# Patient Record
Sex: FEMALE | Race: WHITE | Employment: OTHER | URBAN - METROPOLITAN AREA
[De-identification: names, ages, dates, MRNs, and addresses within clinical notes are randomized per-mention and may not be internally consistent; named-entity substitution may affect disease eponyms.]

---

## 2018-03-06 ENCOUNTER — OFFICE VISIT (OUTPATIENT)
Dept: ORTHOPEDIC SURGERY | Age: 63
End: 2018-03-06

## 2018-03-06 VITALS — BODY MASS INDEX: 24.55 KG/M2 | HEIGHT: 61 IN | WEIGHT: 130 LBS

## 2018-03-06 DIAGNOSIS — G56.01 CARPAL TUNNEL SYNDROME OF RIGHT WRIST: ICD-10-CM

## 2018-03-06 PROCEDURE — 1036F TOBACCO NON-USER: CPT | Performed by: ORTHOPAEDIC SURGERY

## 2018-03-06 PROCEDURE — 3017F COLORECTAL CA SCREEN DOC REV: CPT | Performed by: ORTHOPAEDIC SURGERY

## 2018-03-06 PROCEDURE — G8420 CALC BMI NORM PARAMETERS: HCPCS | Performed by: ORTHOPAEDIC SURGERY

## 2018-03-06 PROCEDURE — G8484 FLU IMMUNIZE NO ADMIN: HCPCS | Performed by: ORTHOPAEDIC SURGERY

## 2018-03-06 PROCEDURE — 99203 OFFICE O/P NEW LOW 30 MIN: CPT | Performed by: ORTHOPAEDIC SURGERY

## 2018-03-06 PROCEDURE — G8427 DOCREV CUR MEDS BY ELIG CLIN: HCPCS | Performed by: ORTHOPAEDIC SURGERY

## 2018-03-06 PROCEDURE — 3014F SCREEN MAMMO DOC REV: CPT | Performed by: ORTHOPAEDIC SURGERY

## 2018-03-06 RX ORDER — ASPIRIN 81 MG/1
81 TABLET ORAL DAILY
COMMUNITY

## 2018-03-06 RX ORDER — INSULIN GLARGINE 100 [IU]/ML
40 INJECTION, SOLUTION SUBCUTANEOUS NIGHTLY
COMMUNITY
End: 2021-11-01

## 2018-03-06 RX ORDER — OXYCODONE HYDROCHLORIDE 5 MG/1
5 CAPSULE ORAL
COMMUNITY
End: 2018-03-27

## 2018-03-06 RX ORDER — AMLODIPINE BESYLATE 5 MG/1
10 TABLET ORAL DAILY
COMMUNITY
Start: 2016-05-11

## 2018-03-06 RX ORDER — LEVOTHYROXINE SODIUM 0.07 MG/1
100 TABLET ORAL DAILY
COMMUNITY
End: 2019-02-05 | Stop reason: DRUGHIGH

## 2018-03-06 RX ORDER — ATORVASTATIN CALCIUM 10 MG/1
40 TABLET, FILM COATED ORAL DAILY
Status: ON HOLD | COMMUNITY
End: 2019-03-02 | Stop reason: CLARIF

## 2018-03-06 RX ORDER — MONTELUKAST SODIUM 10 MG/1
10 TABLET ORAL NIGHTLY
COMMUNITY

## 2018-03-06 RX ORDER — TRAZODONE HYDROCHLORIDE 50 MG/1
25 TABLET ORAL NIGHTLY
COMMUNITY
End: 2021-11-01

## 2018-03-06 RX ORDER — CLOPIDOGREL BISULFATE 75 MG/1
75 TABLET ORAL
COMMUNITY

## 2018-03-06 RX ORDER — ROPINIROLE 4 MG/1
TABLET, FILM COATED ORAL
COMMUNITY
Start: 2017-05-31

## 2018-03-19 ENCOUNTER — TELEPHONE (OUTPATIENT)
Dept: ORTHOPEDIC SURGERY | Age: 63
End: 2018-03-19

## 2018-04-03 ENCOUNTER — TELEPHONE (OUTPATIENT)
Dept: ORTHOPEDIC SURGERY | Age: 63
End: 2018-04-03

## 2018-04-04 ENCOUNTER — HOSPITAL ENCOUNTER (OUTPATIENT)
Dept: SURGERY | Age: 63
Discharge: HOME | End: 2018-04-04
Attending: ORTHOPAEDIC SURGERY | Admitting: ORTHOPAEDIC SURGERY

## 2018-04-04 VITALS
HEIGHT: 61 IN | DIASTOLIC BLOOD PRESSURE: 61 MMHG | TEMPERATURE: 97.4 F | HEART RATE: 69 BPM | OXYGEN SATURATION: 98 % | RESPIRATION RATE: 18 BRPM | SYSTOLIC BLOOD PRESSURE: 141 MMHG | WEIGHT: 130 LBS | BODY MASS INDEX: 24.55 KG/M2

## 2018-04-04 DIAGNOSIS — G56.01 CARPAL TUNNEL SYNDROME OF RIGHT WRIST: Primary | ICD-10-CM

## 2018-04-04 LAB
GLUCOSE BLD-MCNC: 114 MG/DL (ref 70–99)
GLUCOSE BLD-MCNC: 93 MG/DL (ref 70–99)
PERFORMED ON: ABNORMAL
PERFORMED ON: NORMAL

## 2018-04-04 RX ORDER — LABETALOL HYDROCHLORIDE 5 MG/ML
5 INJECTION, SOLUTION INTRAVENOUS EVERY 10 MIN PRN
Status: DISCONTINUED | OUTPATIENT
Start: 2018-04-04 | End: 2018-04-05 | Stop reason: HOSPADM

## 2018-04-04 RX ORDER — HYDRALAZINE HYDROCHLORIDE 20 MG/ML
5 INJECTION INTRAMUSCULAR; INTRAVENOUS
Status: DISCONTINUED | OUTPATIENT
Start: 2018-04-04 | End: 2018-04-05 | Stop reason: HOSPADM

## 2018-04-04 RX ORDER — OXYCODONE HYDROCHLORIDE AND ACETAMINOPHEN 5; 325 MG/1; MG/1
2 TABLET ORAL PRN
Status: ACTIVE | OUTPATIENT
Start: 2018-04-04 | End: 2018-04-04

## 2018-04-04 RX ORDER — OXYCODONE HYDROCHLORIDE AND ACETAMINOPHEN 5; 325 MG/1; MG/1
1 TABLET ORAL PRN
Status: ACTIVE | OUTPATIENT
Start: 2018-04-04 | End: 2018-04-04

## 2018-04-04 RX ORDER — DIPHENHYDRAMINE HYDROCHLORIDE 50 MG/ML
12.5 INJECTION INTRAMUSCULAR; INTRAVENOUS
Status: ACTIVE | OUTPATIENT
Start: 2018-04-04 | End: 2018-04-04

## 2018-04-04 RX ORDER — SODIUM CHLORIDE 0.9 % (FLUSH) 0.9 %
10 SYRINGE (ML) INJECTION PRN
Status: DISCONTINUED | OUTPATIENT
Start: 2018-04-04 | End: 2018-04-05 | Stop reason: HOSPADM

## 2018-04-04 RX ORDER — HYDROCODONE BITARTRATE AND ACETAMINOPHEN 5; 325 MG/1; MG/1
1 TABLET ORAL EVERY 6 HOURS PRN
Qty: 10 TABLET | Refills: 0 | Status: SHIPPED | OUTPATIENT
Start: 2018-04-04 | End: 2018-04-11

## 2018-04-04 RX ORDER — MEPERIDINE HYDROCHLORIDE 50 MG/ML
12.5 INJECTION INTRAMUSCULAR; INTRAVENOUS; SUBCUTANEOUS EVERY 5 MIN PRN
Status: DISCONTINUED | OUTPATIENT
Start: 2018-04-04 | End: 2018-04-05 | Stop reason: HOSPADM

## 2018-04-04 RX ORDER — MORPHINE SULFATE 2 MG/ML
2 INJECTION, SOLUTION INTRAMUSCULAR; INTRAVENOUS EVERY 5 MIN PRN
Status: DISCONTINUED | OUTPATIENT
Start: 2018-04-04 | End: 2018-04-05 | Stop reason: HOSPADM

## 2018-04-04 RX ORDER — LIDOCAINE HYDROCHLORIDE 10 MG/ML
1 INJECTION, SOLUTION EPIDURAL; INFILTRATION; INTRACAUDAL; PERINEURAL
Status: ACTIVE | OUTPATIENT
Start: 2018-04-04 | End: 2018-04-04

## 2018-04-04 RX ORDER — ONDANSETRON 2 MG/ML
4 INJECTION INTRAMUSCULAR; INTRAVENOUS
Status: ACTIVE | OUTPATIENT
Start: 2018-04-04 | End: 2018-04-04

## 2018-04-04 RX ORDER — SODIUM CHLORIDE, SODIUM LACTATE, POTASSIUM CHLORIDE, CALCIUM CHLORIDE 600; 310; 30; 20 MG/100ML; MG/100ML; MG/100ML; MG/100ML
INJECTION, SOLUTION INTRAVENOUS CONTINUOUS
Status: DISCONTINUED | OUTPATIENT
Start: 2018-04-04 | End: 2018-04-05 | Stop reason: HOSPADM

## 2018-04-04 RX ORDER — MORPHINE SULFATE 2 MG/ML
1 INJECTION, SOLUTION INTRAMUSCULAR; INTRAVENOUS EVERY 5 MIN PRN
Status: DISCONTINUED | OUTPATIENT
Start: 2018-04-04 | End: 2018-04-05 | Stop reason: HOSPADM

## 2018-04-04 RX ORDER — SODIUM CHLORIDE 0.9 % (FLUSH) 0.9 %
10 SYRINGE (ML) INJECTION EVERY 12 HOURS SCHEDULED
Status: DISCONTINUED | OUTPATIENT
Start: 2018-04-04 | End: 2018-04-05 | Stop reason: HOSPADM

## 2018-04-04 RX ADMIN — SODIUM CHLORIDE, SODIUM LACTATE, POTASSIUM CHLORIDE, CALCIUM CHLORIDE: 600; 310; 30; 20 INJECTION, SOLUTION INTRAVENOUS at 09:11

## 2018-04-04 ASSESSMENT — PAIN SCALES - GENERAL
PAINLEVEL_OUTOF10: 0

## 2018-04-04 ASSESSMENT — PAIN - FUNCTIONAL ASSESSMENT: PAIN_FUNCTIONAL_ASSESSMENT: 0-10

## 2018-04-17 ENCOUNTER — OFFICE VISIT (OUTPATIENT)
Dept: ORTHOPEDIC SURGERY | Age: 63
End: 2018-04-17

## 2018-04-17 VITALS — WEIGHT: 130.07 LBS | BODY MASS INDEX: 24.56 KG/M2 | HEIGHT: 61 IN

## 2018-04-17 DIAGNOSIS — G56.01 CARPAL TUNNEL SYNDROME OF RIGHT WRIST: Primary | ICD-10-CM

## 2018-04-17 PROCEDURE — 99024 POSTOP FOLLOW-UP VISIT: CPT | Performed by: ORTHOPAEDIC SURGERY

## 2018-04-17 PROCEDURE — L3908 WHO COCK-UP NONMOLDE PRE OTS: HCPCS | Performed by: ORTHOPAEDIC SURGERY

## 2018-05-08 ENCOUNTER — OFFICE VISIT (OUTPATIENT)
Dept: ORTHOPEDIC SURGERY | Age: 63
End: 2018-05-08

## 2018-05-08 VITALS
SYSTOLIC BLOOD PRESSURE: 129 MMHG | BODY MASS INDEX: 24.56 KG/M2 | DIASTOLIC BLOOD PRESSURE: 79 MMHG | HEIGHT: 61 IN | WEIGHT: 130.07 LBS

## 2018-05-08 DIAGNOSIS — M65.352 TRIGGER FINGER, LEFT LITTLE FINGER: ICD-10-CM

## 2018-05-08 PROCEDURE — 99024 POSTOP FOLLOW-UP VISIT: CPT | Performed by: ORTHOPAEDIC SURGERY

## 2018-05-08 PROCEDURE — 20550 NJX 1 TENDON SHEATH/LIGAMENT: CPT | Performed by: ORTHOPAEDIC SURGERY

## 2018-08-17 ENCOUNTER — HOSPITAL ENCOUNTER (OUTPATIENT)
Dept: SURGERY | Age: 63
Discharge: HOME | End: 2018-08-17
Attending: ANESTHESIOLOGY | Admitting: ANESTHESIOLOGY

## 2018-08-17 VITALS
HEIGHT: 61 IN | OXYGEN SATURATION: 96 % | TEMPERATURE: 97.4 F | WEIGHT: 138.56 LBS | SYSTOLIC BLOOD PRESSURE: 140 MMHG | DIASTOLIC BLOOD PRESSURE: 63 MMHG | RESPIRATION RATE: 16 BRPM | HEART RATE: 70 BPM | BODY MASS INDEX: 26.16 KG/M2

## 2018-08-17 DIAGNOSIS — R52 PAIN: ICD-10-CM

## 2018-08-17 LAB
ANION GAP SERPL CALCULATED.3IONS-SCNC: 10 MMOL/L (ref 3–16)
BUN BLDV-MCNC: 28 MG/DL (ref 7–20)
CALCIUM SERPL-MCNC: 8.9 MG/DL (ref 8.3–10.6)
CHLORIDE BLD-SCNC: 98 MMOL/L (ref 99–110)
CO2: 28 MMOL/L (ref 21–32)
CREAT SERPL-MCNC: 1 MG/DL (ref 0.6–1.2)
GFR AFRICAN AMERICAN: >60
GFR NON-AFRICAN AMERICAN: 56
GLUCOSE BLD-MCNC: 112 MG/DL (ref 70–99)
GLUCOSE BLD-MCNC: 88 MG/DL (ref 70–99)
GLUCOSE BLD-MCNC: 98 MG/DL (ref 70–99)
PERFORMED ON: NORMAL
PERFORMED ON: NORMAL
POTASSIUM SERPL-SCNC: 4.4 MMOL/L (ref 3.5–5.1)
SODIUM BLD-SCNC: 136 MMOL/L (ref 136–145)

## 2018-08-17 RX ORDER — SODIUM CHLORIDE 0.9 % (FLUSH) 0.9 %
10 SYRINGE (ML) INJECTION EVERY 12 HOURS SCHEDULED
Status: DISCONTINUED | OUTPATIENT
Start: 2018-08-17 | End: 2018-08-18 | Stop reason: HOSPADM

## 2018-08-17 RX ORDER — SODIUM CHLORIDE 0.9 % (FLUSH) 0.9 %
10 SYRINGE (ML) INJECTION PRN
Status: DISCONTINUED | OUTPATIENT
Start: 2018-08-17 | End: 2018-08-18 | Stop reason: HOSPADM

## 2018-08-17 RX ORDER — LABETALOL HYDROCHLORIDE 5 MG/ML
5 INJECTION, SOLUTION INTRAVENOUS EVERY 10 MIN PRN
Status: DISCONTINUED | OUTPATIENT
Start: 2018-08-17 | End: 2018-08-18 | Stop reason: HOSPADM

## 2018-08-17 RX ORDER — FENTANYL CITRATE 50 UG/ML
25 INJECTION, SOLUTION INTRAMUSCULAR; INTRAVENOUS EVERY 5 MIN PRN
Status: DISCONTINUED | OUTPATIENT
Start: 2018-08-17 | End: 2018-08-18 | Stop reason: HOSPADM

## 2018-08-17 RX ORDER — SODIUM CHLORIDE 9 MG/ML
INJECTION, SOLUTION INTRAVENOUS CONTINUOUS
Status: DISCONTINUED | OUTPATIENT
Start: 2018-08-17 | End: 2018-08-18 | Stop reason: HOSPADM

## 2018-08-17 RX ORDER — ONDANSETRON 2 MG/ML
4 INJECTION INTRAMUSCULAR; INTRAVENOUS
Status: ACTIVE | OUTPATIENT
Start: 2018-08-17 | End: 2018-08-17

## 2018-08-17 RX ORDER — HYDROMORPHONE HCL 110MG/55ML
0.5 PATIENT CONTROLLED ANALGESIA SYRINGE INTRAVENOUS EVERY 5 MIN PRN
Status: DISCONTINUED | OUTPATIENT
Start: 2018-08-17 | End: 2018-08-18 | Stop reason: HOSPADM

## 2018-08-17 RX ORDER — CEFAZOLIN SODIUM 2 G/100ML
2 INJECTION, SOLUTION INTRAVENOUS ONCE
Status: COMPLETED | OUTPATIENT
Start: 2018-08-17 | End: 2018-08-17

## 2018-08-17 RX ORDER — HYDROMORPHONE HCL 110MG/55ML
0.25 PATIENT CONTROLLED ANALGESIA SYRINGE INTRAVENOUS EVERY 5 MIN PRN
Status: DISCONTINUED | OUTPATIENT
Start: 2018-08-17 | End: 2018-08-18 | Stop reason: HOSPADM

## 2018-08-17 RX ORDER — SODIUM CHLORIDE, SODIUM LACTATE, POTASSIUM CHLORIDE, CALCIUM CHLORIDE 600; 310; 30; 20 MG/100ML; MG/100ML; MG/100ML; MG/100ML
INJECTION, SOLUTION INTRAVENOUS CONTINUOUS
Status: DISCONTINUED | OUTPATIENT
Start: 2018-08-17 | End: 2018-08-18 | Stop reason: HOSPADM

## 2018-08-17 RX ORDER — FENTANYL CITRATE 50 UG/ML
50 INJECTION, SOLUTION INTRAMUSCULAR; INTRAVENOUS EVERY 5 MIN PRN
Status: DISCONTINUED | OUTPATIENT
Start: 2018-08-17 | End: 2018-08-18 | Stop reason: HOSPADM

## 2018-08-17 RX ADMIN — SODIUM CHLORIDE: 9 INJECTION, SOLUTION INTRAVENOUS at 08:28

## 2018-08-17 RX ADMIN — CEFAZOLIN SODIUM 2 G: 2 INJECTION, SOLUTION INTRAVENOUS at 08:41

## 2018-08-17 ASSESSMENT — LIFESTYLE VARIABLES: SMOKING_STATUS: 1

## 2018-08-17 ASSESSMENT — PAIN - FUNCTIONAL ASSESSMENT: PAIN_FUNCTIONAL_ASSESSMENT: 0-10

## 2018-08-17 ASSESSMENT — PAIN SCALES - GENERAL
PAINLEVEL_OUTOF10: 0

## 2018-08-17 NOTE — PROGRESS NOTES
Pt d/c'd to waiting room with daughter. Awaiting [facility]. [facility] stated they will not call on arrival and will leave if pt not waiting/ready. Pt stable.  Vss.

## 2018-08-17 NOTE — ANESTHESIA PRE-OP
Yes Historical Provider, MD   rOPINIRole (REQUIP) 4 MG tablet TAKE 1 TABLET EVERY DAY 5/31/17  Yes Historical Provider, MD   VORTIoxetine (TRINTELLIX) 10 MG TABS tablet Take 10 mg by mouth daily    Yes Historical Provider, MD   linagliptin (TRADJENTA) 5 MG tablet Take 5 mg by mouth daily    Yes Historical Provider, MD   traZODone (DESYREL) 50 MG tablet Take 50 mg by mouth nightly   Yes Historical Provider, MD   tiotropium (SPIRIVA) 18 MCG inhalation capsule Inhale 18 mcg into the lungs daily    Historical Provider, MD   aspirin (ASPIR-LOW) 81 MG EC tablet Take 81 mg by mouth daily     Historical Provider, MD   clopidogrel (PLAVIX) 75 MG tablet Take 75 mg by mouth    Historical Provider, MD       Current medications:    Current Outpatient Prescriptions   Medication Sig Dispense Refill    gabapentin (NEURONTIN) 300 MG capsule Take 300 mg by mouth 3 times daily. Hershal Call pantoprazole (PROTONIX) 40 MG tablet Take 40 mg by mouth daily      cyclobenzaprine (FLEXERIL) 10 MG tablet Take 10 mg by mouth 3 times daily as needed      tapentadol (NUCYNTA) 75 MG TABS Take 75 mg by mouth every 8 hours. Hershal Call benztropine (COGENTIN) 1 MG tablet Take 1 mg by mouth daily      Roflumilast (DALIRESP) 250 MCG TABS Take 1 tablet by mouth daily      insulin glargine (LANTUS) 100 UNIT/ML injection vial Inject 30 Units into the skin daily AM      insulin aspart (NOVOLOG) 100 UNIT/ML injection vial Inject 8 Units into the skin 3 times daily (before meals) PLUS SLIDING SCALE      amLODIPine (NORVASC) 5 MG tablet Take 10 mg by mouth daily       atorvastatin (LIPITOR) 10 MG tablet Take 40 mg by mouth daily       carbidopa-levodopa (SINEMET)  MG per tablet Take 3 tablets by mouth 3 times daily       levothyroxine (SYNTHROID) 75 MCG tablet Take 100 mcg by mouth Daily       lurasidone (LATUDA) 60 MG TABS tablet Take 60 mg by mouth nightly       metoprolol tartrate (LOPRESSOR) 25 MG tablet Take 25 mg by mouth 2 times daily       montelukast (SINGULAIR) 10 MG tablet Take 10 mg by mouth nightly       rOPINIRole (REQUIP) 4 MG tablet TAKE 1 TABLET EVERY DAY      VORTIoxetine (TRINTELLIX) 10 MG TABS tablet Take 10 mg by mouth daily       linagliptin (TRADJENTA) 5 MG tablet Take 5 mg by mouth daily       traZODone (DESYREL) 50 MG tablet Take 50 mg by mouth nightly      tiotropium (SPIRIVA) 18 MCG inhalation capsule Inhale 18 mcg into the lungs daily      aspirin (ASPIR-LOW) 81 MG EC tablet Take 81 mg by mouth daily       clopidogrel (PLAVIX) 75 MG tablet Take 75 mg by mouth       Current Facility-Administered Medications   Medication Dose Route Frequency Provider Last Rate Last Dose    ceFAZolin (ANCEF) 2 g in dextrose 4 % 100 mL IVPB (premix)  2 g Intravenous Once Marija Saravia MD           Allergies: Allergies   Allergen Reactions    Ipratropium-Albuterol Other (See Comments)     Nose bleed    Ipratropium-Albuterol      Nose bleeds    Metformin Other (See Comments)     Effects kidneys    Primidone Other (See Comments)     Other reaction(s):  Other (See Comments)  Loss of muscle control  COULDN'T USE HER MUSCLES    Quinidine Hives    Sulfa Antibiotics Rash       Problem List:    Patient Active Problem List   Diagnosis Code    Carpal tunnel syndrome of right wrist G56.01    Trigger finger, left little finger M65.352       Past Medical History:        Diagnosis Date    Anxiety     Arthritis     CAD (coronary artery disease)     [facility] obstructive [facility]) [facility] [address]     Depression     Diabetes mellitus ([facility] [address]     GERD (gastroesophageal reflux disease)     Hyperlipidemia     Hypertension     Kidney failure     r/t sepsis    PACO (obstructive sleep apnea)     Parkinson disease (HCC)     Restless leg syndrome     Thyroid disease        Past Surgical History:        Procedure Laterality Date    BRONCHOSCOPY  06/2017    [facility]    CARPAL TUNNEL RELEASE Left     CARPAL TUNNEL RELEASE Right 04/04/2018 abnormalities, . Abdominal:           Vascular:                                      Anesthesia Plan      TIVA     ASA 3       Induction: intravenous. Anesthetic plan and risks discussed with patient. Plan discussed with CRNA. MEDICATIONS:  Current Outpatient Prescriptions   Medication Sig Dispense Refill    gabapentin (NEURONTIN) 300 MG capsule Take 300 mg by mouth 3 times daily. Elsa Goes pantoprazole (PROTONIX) 40 MG tablet Take 40 mg by mouth daily      cyclobenzaprine (FLEXERIL) 10 MG tablet Take 10 mg by mouth 3 times daily as needed      tapentadol (NUCYNTA) 75 MG TABS Take 75 mg by mouth every 8 hours. Elsa Goes benztropine (COGENTIN) 1 MG tablet Take 1 mg by mouth daily      Roflumilast (DALIRESP) 250 MCG TABS Take 1 tablet by mouth daily      insulin glargine (LANTUS) 100 UNIT/ML injection vial Inject 30 Units into the skin daily AM      insulin aspart (NOVOLOG) 100 UNIT/ML injection vial Inject 8 Units into the skin 3 times daily (before meals) PLUS SLIDING SCALE      amLODIPine (NORVASC) 5 MG tablet Take 10 mg by mouth daily       atorvastatin (LIPITOR) 10 MG tablet Take 40 mg by mouth daily       carbidopa-levodopa (SINEMET)  MG per tablet Take 3 tablets by mouth 3 times daily       levothyroxine (SYNTHROID) 75 MCG tablet Take 100 mcg by mouth Daily       lurasidone (LATUDA) 60 MG TABS tablet Take 60 mg by mouth nightly       metoprolol tartrate (LOPRESSOR) 25 MG tablet Take 25 mg by mouth 2 times daily       montelukast (SINGULAIR) 10 MG tablet Take 10 mg by mouth nightly       rOPINIRole (REQUIP) 4 MG tablet TAKE 1 TABLET EVERY DAY      VORTIoxetine (TRINTELLIX) 10 MG TABS tablet Take 10 mg by mouth daily       linagliptin (TRADJENTA) 5 MG tablet Take 5 mg by mouth daily       traZODone (DESYREL) 50 MG tablet Take 50 mg by mouth nightly      tiotropium (SPIRIVA) 18 MCG inhalation capsule Inhale 18 mcg into the lungs daily      aspirin (ASPIR-LOW) 81 MG EC tablet Take 81 mg by mouth daily       clopidogrel (PLAVIX) 75 MG tablet Take 75 mg by mouth       Current Facility-Administered Medications   Medication Dose Route Frequency Provider Last Rate Last Dose    ceFAZolin (ANCEF) 2 g in dextrose 4 % 100 mL IVPB (premix)  2 g Intravenous Once Wolfgangam Lauren Boyd MD            LABS:  Lab Results   Component Value Date    WBC 9.4 01/13/2010    HGB 14.2 01/13/2010    HCT 42.4 01/13/2010    MCV 88.4 01/13/2010     01/13/2010    GLUCOSE 76 01/13/2010       Lab Results   Component Value Date     01/13/2010    K 4.7 01/13/2010     01/13/2010    CO2 33 (H) 01/13/2010    BUN 21 (H) 01/13/2010    CREATININE 1.0 01/13/2010       Problem List:  Patient Active Problem List   Diagnosis    Carpal tunnel syndrome of right wrist    Trigger finger, left little finger           Sai Diaz MD   8/17/2018

## 2018-08-17 NOTE — PROGRESS NOTES
PT received into bay 6 from OR. Pt with eyes closed, responds to verbal stimuli. O2 at 4L/NC. Resp easy, unlabored. Pt appears comfortable. Report obtained. Mid lower back dressing clean/dry/intact. Will monitor.

## 2018-08-21 NOTE — ANESTHESIA POST-OP
Anesthesia Post-op Note    Patient: Berna Rodriguez  MRN: [id number]  YOB: 1955  Date of evaluation: 8/21/2018  Time:  6:15 PM     Procedure(s) Performed:     Last Vitals: BP (!) 140/63   Pulse 70   Temp 97.4 °F (36.3 °C) (Temporal)   Resp 16   Ht 5' 1\" (1.549 m)   Wt 138 lb 9 oz (62.9 kg)   SpO2 96%   BMI 26.18 kg/m²     Chary Phase I: Chary Score: 10    Chary Phase II: Chary Score: 10    Anesthesia Post Evaluation    Final anesthesia type: TIVA and MAC  Patient location during evaluation: PACU  Airway patency: patent  Complications: no  Cardiovascular status: hemodynamically stable  Respiratory status: acceptable        Sai Diaz MD  6:15 PM

## 2018-08-21 NOTE — OP NOTE
confirmation of vertebral count through thoracolumbar films, the C-arm was used to help define the angle and access point for the spine. Prior to needle placement, and after location of the access point, under fluoroscopic guidance L3-L4 area was identified, 10 mL of 1% lidocaine mixed with 0.25% Marcaine was used to anesthetize the skin. A scalpel was used to make a stab incision down to the supraspinous ligament. A Vertiflex dilator was placed into the skin and advanced using intermittent fluoroscopic guidance in an alternating AP and lateral images down to the lamina between L3 and L4. A series of dilators were used to place the working cannula in proper position, dorsal to the lamina. A measuring gauge was introduced to the proper depth and the space was measured. The space measured and found to be 08mm. A 10mm Superion device was introduced and deployed at the correct depth and then advanced down to the lamina. AP and lateral images were taken to confirm proper placement of the device. Same steps were repeated at the L4-L5 level, the space measured and found to be 10mm    Then, started to close the wound and started closing with Vicryle 3/0 in 2 layers and the skin with Monocryle 4/0 sutures. The incision then covered with 4 x 4 gauze and tegaderm. The patient was then taken from the procedure room via stretcher, and placed in a recovery room. Once in the recovery room, the patient felt instant relief in her leg pain.           The patient was discharged home in good condition after being given discharge instructions

## 2018-10-17 ENCOUNTER — OFFICE VISIT (OUTPATIENT)
Dept: ORTHOPEDIC SURGERY | Age: 63
End: 2018-10-17
Payer: COMMERCIAL

## 2018-10-17 VITALS
WEIGHT: 138 LBS | BODY MASS INDEX: 26.06 KG/M2 | DIASTOLIC BLOOD PRESSURE: 80 MMHG | HEART RATE: 101 BPM | SYSTOLIC BLOOD PRESSURE: 159 MMHG | HEIGHT: 61 IN

## 2018-10-17 DIAGNOSIS — M25.511 RIGHT SHOULDER PAIN, UNSPECIFIED CHRONICITY: ICD-10-CM

## 2018-10-17 DIAGNOSIS — S42.211A CLOSED DISPLACED FRACTURE OF SURGICAL NECK OF RIGHT HUMERUS, UNSPECIFIED FRACTURE MORPHOLOGY, INITIAL ENCOUNTER: Primary | ICD-10-CM

## 2018-10-17 PROCEDURE — G8419 CALC BMI OUT NRM PARAM NOF/U: HCPCS | Performed by: ORTHOPAEDIC SURGERY

## 2018-10-17 PROCEDURE — G8484 FLU IMMUNIZE NO ADMIN: HCPCS | Performed by: ORTHOPAEDIC SURGERY

## 2018-10-17 PROCEDURE — 1036F TOBACCO NON-USER: CPT | Performed by: ORTHOPAEDIC SURGERY

## 2018-10-17 PROCEDURE — G8427 DOCREV CUR MEDS BY ELIG CLIN: HCPCS | Performed by: ORTHOPAEDIC SURGERY

## 2018-10-17 PROCEDURE — 99214 OFFICE O/P EST MOD 30 MIN: CPT | Performed by: ORTHOPAEDIC SURGERY

## 2018-10-17 PROCEDURE — L3670 SO ACRO/CLAV CAN WEB PRE OTS: HCPCS | Performed by: ORTHOPAEDIC SURGERY

## 2018-10-17 PROCEDURE — 3017F COLORECTAL CA SCREEN DOC REV: CPT | Performed by: ORTHOPAEDIC SURGERY

## 2018-10-17 NOTE — PROGRESS NOTES
disease       Social History     Social History    Marital status: Legally      Spouse name: N/A    Number of children: N/A    Years of education: N/A     Occupational History    Not on file. Social History Main Topics    Smoking status: Former Smoker     Years: 40.00     Quit date: 3/6/2014    Smokeless tobacco: Never Used    Alcohol use Yes      Comment: rarely    Drug use: No    Sexual activity: Not on file     Other Topics Concern    Not on file     Social History Narrative    No narrative on file       Allergies   Allergen Reactions    Ipratropium-Albuterol Other (See Comments)     Nose bleed    Ipratropium-Albuterol      Nose bleeds    Metformin Other (See Comments)     Effects kidneys    Primidone Other (See Comments)     Other reaction(s): Other (See Comments)  Loss of muscle control  COULDN'T USE HER MUSCLES    Quinidine Hives    Sulfa Antibiotics Rash     Current Outpatient Prescriptions on File Prior to Visit   Medication Sig Dispense Refill    gabapentin (NEURONTIN) 300 MG capsule Take 300 mg by mouth 3 times daily. Eric Sever pantoprazole (PROTONIX) 40 MG tablet Take 40 mg by mouth daily      cyclobenzaprine (FLEXERIL) 10 MG tablet Take 10 mg by mouth 3 times daily as needed      tiotropium (SPIRIVA) 18 MCG inhalation capsule Inhale 18 mcg into the lungs daily      tapentadol (NUCYNTA) 75 MG TABS Take 75 mg by mouth every 8 hours. Eric Sever benztropine (COGENTIN) 1 MG tablet Take 1 mg by mouth daily      Roflumilast (DALIRESP) 250 MCG TABS Take 1 tablet by mouth daily      insulin glargine (LANTUS) 100 UNIT/ML injection vial Inject 30 Units into the skin daily AM      insulin aspart (NOVOLOG) 100 UNIT/ML injection vial Inject 8 Units into the skin 3 times daily (before meals) PLUS SLIDING SCALE      amLODIPine (NORVASC) 5 MG tablet Take 10 mg by mouth daily       atorvastatin (LIPITOR) 10 MG tablet Take 40 mg by mouth daily       aspirin (ASPIR-LOW) 81 MG EC tablet Take with a verbal recognition program (DRAGON) and it was checked for errors. It is possible that there are still dictated errors within this office note. If so, please bring any errors to my attention for an addendum. All efforts were made to ensure that this office note is accurate.    ________________________  I, Dr. Trini Jang, personally performed the services described in this documentation as described by Brian Kim PA-C in my presence, and it is both accurate and complete. Shiv Lay MD, PhD  10/17/2018

## 2018-10-23 RX ORDER — SODIUM CHLORIDE, SODIUM LACTATE, POTASSIUM CHLORIDE, CALCIUM CHLORIDE 600; 310; 30; 20 MG/100ML; MG/100ML; MG/100ML; MG/100ML
INJECTION, SOLUTION INTRAVENOUS CONTINUOUS
Status: CANCELLED | OUTPATIENT
Start: 2018-10-26

## 2018-10-24 ENCOUNTER — TELEPHONE (OUTPATIENT)
Dept: ORTHOPEDIC SURGERY | Age: 63
End: 2018-10-24

## 2018-10-24 ENCOUNTER — HOSPITAL ENCOUNTER (OUTPATIENT)
Dept: PREADMISSION TESTING | Age: 63
Discharge: HOME | DRG: 492 | End: 2018-10-28
Payer: COMMERCIAL

## 2018-10-24 VITALS
HEART RATE: 104 BPM | BODY MASS INDEX: 27.38 KG/M2 | OXYGEN SATURATION: 93 % | RESPIRATION RATE: 16 BRPM | TEMPERATURE: 96.8 F | SYSTOLIC BLOOD PRESSURE: 150 MMHG | DIASTOLIC BLOOD PRESSURE: 72 MMHG | WEIGHT: 145 LBS | HEIGHT: 61 IN

## 2018-10-24 LAB
ABO/RH: NORMAL
AMORPHOUS: ABNORMAL /HPF
ANION GAP SERPL CALCULATED.3IONS-SCNC: 12 MMOL/L (ref 3–16)
ANTIBODY SCREEN: NORMAL
APTT: 37.6 SEC (ref 26–36)
BACTERIA: ABNORMAL /HPF
BASOPHILS ABSOLUTE: 0 K/UL (ref 0–0.2)
BASOPHILS RELATIVE PERCENT: 0.4 %
BILIRUBIN URINE: NEGATIVE
BLOOD, URINE: NEGATIVE
BUN BLDV-MCNC: 22 MG/DL (ref 7–20)
CALCIUM SERPL-MCNC: 9.2 MG/DL (ref 8.3–10.6)
CHLORIDE BLD-SCNC: 100 MMOL/L (ref 99–110)
CLARITY: CLEAR
CO2: 27 MMOL/L (ref 21–32)
COLOR: YELLOW
CREAT SERPL-MCNC: 1 MG/DL (ref 0.6–1.2)
EKG ATRIAL RATE: 101 BPM
EKG DIAGNOSIS: NORMAL
EKG P AXIS: 79 DEGREES
EKG P-R INTERVAL: 156 MS
EKG Q-T INTERVAL: 374 MS
EKG QRS DURATION: 130 MS
EKG QTC CALCULATION (BAZETT): 484 MS
EKG R AXIS: 140 DEGREES
EKG T AXIS: 64 DEGREES
EKG VENTRICULAR RATE: 101 BPM
EOSINOPHILS ABSOLUTE: 0.4 K/UL (ref 0–0.6)
EOSINOPHILS RELATIVE PERCENT: 5 %
EPITHELIAL CELLS, UA: ABNORMAL /HPF
GFR AFRICAN AMERICAN: >60
GFR NON-AFRICAN AMERICAN: 56
GLUCOSE BLD-MCNC: 54 MG/DL (ref 70–99)
GLUCOSE URINE: NEGATIVE MG/DL
HCT VFR BLD CALC: 39.6 % (ref 36–48)
HEMOGLOBIN: 12.9 G/DL (ref 12–16)
INR BLD: 0.95 (ref 0.86–1.14)
KETONES, URINE: NEGATIVE MG/DL
LEUKOCYTE ESTERASE, URINE: NEGATIVE
LYMPHOCYTES ABSOLUTE: 1.4 K/UL (ref 1–5.1)
LYMPHOCYTES RELATIVE PERCENT: 17.5 %
MCH RBC QN AUTO: 29.9 PG (ref 26–34)
MCHC RBC AUTO-ENTMCNC: 32.5 G/DL (ref 31–36)
MCV RBC AUTO: 92 FL (ref 80–100)
MICROSCOPIC EXAMINATION: YES
MONOCYTES ABSOLUTE: 0.9 K/UL (ref 0–1.3)
MONOCYTES RELATIVE PERCENT: 10.9 %
NEUTROPHILS ABSOLUTE: 5.5 K/UL (ref 1.7–7.7)
NEUTROPHILS RELATIVE PERCENT: 66.2 %
NITRITE, URINE: NEGATIVE
PDW BLD-RTO: 15.4 % (ref 12.4–15.4)
PH UA: 6
PLATELET # BLD: 280 K/UL (ref 135–450)
PMV BLD AUTO: 7.5 FL (ref 5–10.5)
POTASSIUM SERPL-SCNC: 4.1 MMOL/L (ref 3.5–5.1)
PROTEIN UA: 30 MG/DL
PROTHROMBIN TIME: 10.8 SEC (ref 9.8–13)
RBC # BLD: 4.3 M/UL (ref 4–5.2)
RBC UA: ABNORMAL /HPF (ref 0–2)
SODIUM BLD-SCNC: 139 MMOL/L (ref 136–145)
SPECIFIC GRAVITY UA: 1.01
URINE TYPE: ABNORMAL
UROBILINOGEN, URINE: 0.2 E.U./DL
WBC # BLD: 8.3 K/UL (ref 4–11)
WBC UA: ABNORMAL /HPF (ref 0–5)

## 2018-10-24 PROCEDURE — 93010 ELECTROCARDIOGRAM REPORT: CPT | Performed by: INTERNAL MEDICINE

## 2018-10-24 PROCEDURE — 87086 URINE CULTURE/COLONY COUNT: CPT

## 2018-10-24 PROCEDURE — 86900 BLOOD TYPING SEROLOGIC ABO: CPT

## 2018-10-24 PROCEDURE — 86850 RBC ANTIBODY SCREEN: CPT

## 2018-10-24 PROCEDURE — 85025 COMPLETE CBC W/AUTO DIFF WBC: CPT

## 2018-10-24 PROCEDURE — 81001 URINALYSIS AUTO W/SCOPE: CPT

## 2018-10-24 PROCEDURE — 86901 BLOOD TYPING SEROLOGIC RH(D): CPT

## 2018-10-24 PROCEDURE — 83036 HEMOGLOBIN GLYCOSYLATED A1C: CPT

## 2018-10-24 PROCEDURE — 80048 BASIC METABOLIC PNL TOTAL CA: CPT

## 2018-10-24 PROCEDURE — 93005 ELECTROCARDIOGRAM TRACING: CPT | Performed by: ORTHOPAEDIC SURGERY

## 2018-10-24 PROCEDURE — 85730 THROMBOPLASTIN TIME PARTIAL: CPT

## 2018-10-24 PROCEDURE — 85610 PROTHROMBIN TIME: CPT

## 2018-10-24 PROCEDURE — 87641 MR-STAPH DNA AMP PROBE: CPT

## 2018-10-24 RX ORDER — QUETIAPINE FUMARATE 25 MG/1
25 TABLET, FILM COATED ORAL NIGHTLY
COMMUNITY
End: 2022-01-26

## 2018-10-24 NOTE — PROGRESS NOTES
If you have a Living Will or Durable Power of , please bring a copy on the day of your procedure. 15.  With your permission, one family member may accompany you while you are being prepared for surgery. Once you are ready, additional family members may join you. HOW WE KEEP YOU SAFE and WORK TO PREVENT SURGICAL SITE INFECTIONS:  1. Health care workers should always check your ID bracelet to verify your name and birth date. You will be asked many times to state your name, date of birth, and allergies. 2. Health care workers should always clean their hands with soap or alcohol gel before providing care to you. It is okay to ask anyone if they cleaned their hands before they touch you. 3. You will be actively involved in verifying the type of procedure you are having and ensuring the correct surgical site. This will be confirmed multiple times prior to your procedure. Do NOT monique your surgery site UNLESS instructed to by your surgeon. 4. Do not shave or wax for 72 hours prior to procedure near your operative site. Shaving with a razor can irritate your skin and make it easier to develop an infection. On the day of your procedure, any hair that needs to be removed near the surgical site will be clipped by a healthcare worker using a special clippers designed to avoid skin irritation. 5. When you are in the operating room, your surgical site will be cleansed with a special soap, and in most cases, you will be given an antibiotic before the surgery begins. What to expect AFTER YOUR PROCEDURE:  1. Immediately following your procedure, your will be taken to the PACU for the first phase of your recovery. Your nurse will help you recover from any potential side effects of anesthesia, such as extreme drowsiness, changes in your vital signs or breathing patterns. Nausea, headache, muscle aches, or sore throat may also occur after anesthesia.   Your nurse will help you manage these potential side

## 2018-10-25 ENCOUNTER — ANESTHESIA EVENT (OUTPATIENT)
Dept: OPERATING ROOM | Age: 63
DRG: 492 | End: 2018-10-25
Payer: COMMERCIAL

## 2018-10-25 LAB
ESTIMATED AVERAGE GLUCOSE: 159.9 MG/DL
HBA1C MFR BLD: 7.2 %
MRSA SCREEN RT-PCR: NORMAL
URINE CULTURE, ROUTINE: NORMAL

## 2018-10-25 NOTE — PROGRESS NOTES
EKG was faxed to Dr. Ashly Cabrera both on 10-24 and 10-25, requesting review and clearance for surgery on 10-26. [facility] for his MA to please call us back with an answer or fax ASAP. Also spoke with Maddi Greco at Dr. Opal De Luna office, requested to please render clearance as this is what pt saw him for today. She will ask him and fax clearance ASAP. Pulmonary assessment and recommendation faxed to Dr. Epi Donohue, scanned, and placed on the front to chart to show anesthesia    Spoke with Dr. Ashly Cabrera, he stated pt brought the EKG done at [facility] with her and did review it. Read interp of EKG from 10-24 over the phone to Dr. Ashly Cabrera and he   OK it for surgery.

## 2018-10-26 ENCOUNTER — APPOINTMENT (OUTPATIENT)
Dept: GENERAL RADIOLOGY | Age: 63
DRG: 492 | End: 2018-10-26
Attending: ORTHOPAEDIC SURGERY
Payer: COMMERCIAL

## 2018-10-26 ENCOUNTER — HOSPITAL ENCOUNTER (INPATIENT)
Age: 63
LOS: 4 days | Discharge: NURSING, REHAB OR LONG-TERM CARE | DRG: 492 | End: 2018-10-31
Attending: ORTHOPAEDIC SURGERY | Admitting: ORTHOPAEDIC SURGERY
Payer: COMMERCIAL

## 2018-10-26 ENCOUNTER — ANESTHESIA (OUTPATIENT)
Dept: OPERATING ROOM | Age: 63
DRG: 492 | End: 2018-10-26
Payer: COMMERCIAL

## 2018-10-26 VITALS — OXYGEN SATURATION: 97 % | TEMPERATURE: 96.3 F | SYSTOLIC BLOOD PRESSURE: 114 MMHG | DIASTOLIC BLOOD PRESSURE: 62 MMHG

## 2018-10-26 DIAGNOSIS — S42.291A CLOSED 3-PART FRACTURE OF PROXIMAL HUMERUS, RIGHT, INITIAL ENCOUNTER: Primary | ICD-10-CM

## 2018-10-26 LAB
ABO/RH: NORMAL
ANTIBODY SCREEN: NORMAL
GLUCOSE BLD-MCNC: 140 MG/DL (ref 70–99)
GLUCOSE BLD-MCNC: 168 MG/DL (ref 70–99)
PERFORMED ON: ABNORMAL
PERFORMED ON: ABNORMAL

## 2018-10-26 PROCEDURE — 7100000001 HC PACU RECOVERY - ADDTL 15 MIN: Performed by: ORTHOPAEDIC SURGERY

## 2018-10-26 PROCEDURE — 6360000002 HC RX W HCPCS: Performed by: ORTHOPAEDIC SURGERY

## 2018-10-26 PROCEDURE — 2580000003 HC RX 258: Performed by: ORTHOPAEDIC SURGERY

## 2018-10-26 PROCEDURE — 3600000004 HC SURGERY LEVEL 4 BASE: Performed by: ORTHOPAEDIC SURGERY

## 2018-10-26 PROCEDURE — 2709999900 HC NON-CHARGEABLE SUPPLY: Performed by: ORTHOPAEDIC SURGERY

## 2018-10-26 PROCEDURE — 2580000003 HC RX 258: Performed by: ANESTHESIOLOGY

## 2018-10-26 PROCEDURE — 2500000003 HC RX 250 WO HCPCS: Performed by: NURSE ANESTHETIST, CERTIFIED REGISTERED

## 2018-10-26 PROCEDURE — L3650 SO 8 ABD RESTRAINT PRE OTS: HCPCS | Performed by: ORTHOPAEDIC SURGERY

## 2018-10-26 PROCEDURE — 2700000000 HC OXYGEN THERAPY PER DAY

## 2018-10-26 PROCEDURE — 2500000003 HC RX 250 WO HCPCS: Performed by: ANESTHESIOLOGY

## 2018-10-26 PROCEDURE — C1713 ANCHOR/SCREW BN/BN,TIS/BN: HCPCS | Performed by: ORTHOPAEDIC SURGERY

## 2018-10-26 PROCEDURE — 3600000014 HC SURGERY LEVEL 4 ADDTL 15MIN: Performed by: ORTHOPAEDIC SURGERY

## 2018-10-26 PROCEDURE — 94640 AIRWAY INHALATION TREATMENT: CPT

## 2018-10-26 PROCEDURE — 86850 RBC ANTIBODY SCREEN: CPT

## 2018-10-26 PROCEDURE — S0028 INJECTION, FAMOTIDINE, 20 MG: HCPCS | Performed by: NURSE ANESTHETIST, CERTIFIED REGISTERED

## 2018-10-26 PROCEDURE — 6360000002 HC RX W HCPCS: Performed by: ANESTHESIOLOGY

## 2018-10-26 PROCEDURE — 6360000002 HC RX W HCPCS: Performed by: NURSE ANESTHETIST, CERTIFIED REGISTERED

## 2018-10-26 PROCEDURE — 3700000001 HC ADD 15 MINUTES (ANESTHESIA): Performed by: ORTHOPAEDIC SURGERY

## 2018-10-26 PROCEDURE — 6370000000 HC RX 637 (ALT 250 FOR IP): Performed by: ANESTHESIOLOGY

## 2018-10-26 PROCEDURE — 94664 DEMO&/EVAL PT USE INHALER: CPT

## 2018-10-26 PROCEDURE — 86900 BLOOD TYPING SEROLOGIC ABO: CPT

## 2018-10-26 PROCEDURE — 0PSC04Z REPOSITION RIGHT HUMERAL HEAD WITH INTERNAL FIXATION DEVICE, OPEN APPROACH: ICD-10-PCS | Performed by: ORTHOPAEDIC SURGERY

## 2018-10-26 PROCEDURE — 86901 BLOOD TYPING SEROLOGIC RH(D): CPT

## 2018-10-26 PROCEDURE — 73060 X-RAY EXAM OF HUMERUS: CPT

## 2018-10-26 PROCEDURE — C9290 INJ, BUPIVACAINE LIPOSOME: HCPCS | Performed by: ANESTHESIOLOGY

## 2018-10-26 PROCEDURE — 3700000000 HC ANESTHESIA ATTENDED CARE: Performed by: ORTHOPAEDIC SURGERY

## 2018-10-26 PROCEDURE — 94760 N-INVAS EAR/PLS OXIMETRY 1: CPT

## 2018-10-26 PROCEDURE — 94761 N-INVAS EAR/PLS OXIMETRY MLT: CPT

## 2018-10-26 PROCEDURE — 0RNJ0ZZ RELEASE RIGHT SHOULDER JOINT, OPEN APPROACH: ICD-10-PCS | Performed by: ORTHOPAEDIC SURGERY

## 2018-10-26 PROCEDURE — 0LS30ZZ REPOSITION RIGHT UPPER ARM TENDON, OPEN APPROACH: ICD-10-PCS | Performed by: ORTHOPAEDIC SURGERY

## 2018-10-26 PROCEDURE — 3209999900 FLUORO FOR SURGICAL PROCEDURES

## 2018-10-26 PROCEDURE — 7100000000 HC PACU RECOVERY - FIRST 15 MIN: Performed by: ORTHOPAEDIC SURGERY

## 2018-10-26 PROCEDURE — 6370000000 HC RX 637 (ALT 250 FOR IP): Performed by: ORTHOPAEDIC SURGERY

## 2018-10-26 PROCEDURE — 64415 NJX AA&/STRD BRCH PLXS IMG: CPT | Performed by: ANESTHESIOLOGY

## 2018-10-26 DEVICE — IMPLANTABLE DEVICE: Type: IMPLANTABLE DEVICE | Site: SHOULDER | Status: FUNCTIONAL

## 2018-10-26 DEVICE — DBX MIX, 10CC
Type: IMPLANTABLE DEVICE | Site: SHOULDER | Status: FUNCTIONAL
Brand: DBX®

## 2018-10-26 DEVICE — PLATE BNE L90MM STD 3 H PROX HUM S STL LOK COMPR FOR 3.5MM: Type: IMPLANTABLE DEVICE | Site: SHOULDER | Status: FUNCTIONAL

## 2018-10-26 DEVICE — SCREW BNE L36MM DIA3.5MM CORT S STL ST LOK FULL THRD: Type: IMPLANTABLE DEVICE | Site: SHOULDER | Status: FUNCTIONAL

## 2018-10-26 DEVICE — SCREW BNE L26MM DIA3.5MM CORT S STL ST LOK FULL THRD: Type: IMPLANTABLE DEVICE | Site: SHOULDER | Status: FUNCTIONAL

## 2018-10-26 DEVICE — K WIRE FIX L150MM DIA1.6MM S STL THRD TRCR PNT: Type: IMPLANTABLE DEVICE | Site: SHOULDER | Status: FUNCTIONAL

## 2018-10-26 DEVICE — SCREW BNE L40MM DIA3.5MM CORT S STL ST LOK FULL THRD: Type: IMPLANTABLE DEVICE | Site: SHOULDER | Status: FUNCTIONAL

## 2018-10-26 DEVICE — SCREW BNE L28MM DIA3.5MM CORT S STL ST NONCANNULATED LOK: Type: IMPLANTABLE DEVICE | Site: SHOULDER | Status: FUNCTIONAL

## 2018-10-26 DEVICE — SCREW BNE L28MM DIA3.5MM CORT S STL ST LOK FULL THRD: Type: IMPLANTABLE DEVICE | Site: SHOULDER | Status: FUNCTIONAL

## 2018-10-26 DEVICE — SCREW BNE L45MM DIA3.5MM CORT S STL ST LOK FULL THRD: Type: IMPLANTABLE DEVICE | Site: SHOULDER | Status: FUNCTIONAL

## 2018-10-26 RX ORDER — BUPIVACAINE HYDROCHLORIDE 5 MG/ML
INJECTION, SOLUTION EPIDURAL; INTRACAUDAL
Status: COMPLETED
Start: 2018-10-26 | End: 2018-10-26

## 2018-10-26 RX ORDER — BUPIVACAINE HYDROCHLORIDE 5 MG/ML
INJECTION, SOLUTION EPIDURAL; INTRACAUDAL PRN
Status: DISCONTINUED | OUTPATIENT
Start: 2018-10-26 | End: 2018-10-26 | Stop reason: SDUPTHER

## 2018-10-26 RX ORDER — PROPOFOL 10 MG/ML
INJECTION, EMULSION INTRAVENOUS PRN
Status: DISCONTINUED | OUTPATIENT
Start: 2018-10-26 | End: 2018-10-26 | Stop reason: SDUPTHER

## 2018-10-26 RX ORDER — DIPHENHYDRAMINE HYDROCHLORIDE 50 MG/ML
12.5 INJECTION INTRAMUSCULAR; INTRAVENOUS
Status: DISCONTINUED | OUTPATIENT
Start: 2018-10-26 | End: 2018-10-26 | Stop reason: HOSPADM

## 2018-10-26 RX ORDER — LABETALOL HYDROCHLORIDE 5 MG/ML
5 INJECTION, SOLUTION INTRAVENOUS EVERY 10 MIN PRN
Status: DISCONTINUED | OUTPATIENT
Start: 2018-10-26 | End: 2018-10-26 | Stop reason: HOSPADM

## 2018-10-26 RX ORDER — MAGNESIUM HYDROXIDE 1200 MG/15ML
LIQUID ORAL CONTINUOUS PRN
Status: COMPLETED | OUTPATIENT
Start: 2018-10-26 | End: 2018-10-26

## 2018-10-26 RX ORDER — FENTANYL CITRATE 50 UG/ML
INJECTION, SOLUTION INTRAMUSCULAR; INTRAVENOUS PRN
Status: DISCONTINUED | OUTPATIENT
Start: 2018-10-26 | End: 2018-10-26 | Stop reason: SDUPTHER

## 2018-10-26 RX ORDER — SODIUM CHLORIDE 0.9 % (FLUSH) 0.9 %
10 SYRINGE (ML) INJECTION EVERY 12 HOURS SCHEDULED
Status: DISCONTINUED | OUTPATIENT
Start: 2018-10-26 | End: 2018-10-31 | Stop reason: HOSPADM

## 2018-10-26 RX ORDER — ACETAMINOPHEN 325 MG/1
650 TABLET ORAL EVERY 4 HOURS PRN
Status: DISCONTINUED | OUTPATIENT
Start: 2018-10-26 | End: 2018-10-31 | Stop reason: HOSPADM

## 2018-10-26 RX ORDER — PROMETHAZINE HYDROCHLORIDE 25 MG/ML
6.25 INJECTION, SOLUTION INTRAMUSCULAR; INTRAVENOUS
Status: DISCONTINUED | OUTPATIENT
Start: 2018-10-26 | End: 2018-10-26 | Stop reason: HOSPADM

## 2018-10-26 RX ORDER — MIDAZOLAM HYDROCHLORIDE 1 MG/ML
INJECTION INTRAMUSCULAR; INTRAVENOUS PRN
Status: DISCONTINUED | OUTPATIENT
Start: 2018-10-26 | End: 2018-10-26 | Stop reason: SDUPTHER

## 2018-10-26 RX ORDER — SODIUM CHLORIDE, SODIUM LACTATE, POTASSIUM CHLORIDE, CALCIUM CHLORIDE 600; 310; 30; 20 MG/100ML; MG/100ML; MG/100ML; MG/100ML
INJECTION, SOLUTION INTRAVENOUS CONTINUOUS
Status: DISCONTINUED | OUTPATIENT
Start: 2018-10-26 | End: 2018-10-26

## 2018-10-26 RX ORDER — ONDANSETRON 2 MG/ML
4 INJECTION INTRAMUSCULAR; INTRAVENOUS EVERY 6 HOURS PRN
Status: DISCONTINUED | OUTPATIENT
Start: 2018-10-26 | End: 2018-10-31 | Stop reason: HOSPADM

## 2018-10-26 RX ORDER — MEPERIDINE HYDROCHLORIDE 25 MG/ML
12.5 INJECTION INTRAMUSCULAR; INTRAVENOUS; SUBCUTANEOUS EVERY 5 MIN PRN
Status: DISCONTINUED | OUTPATIENT
Start: 2018-10-26 | End: 2018-10-26 | Stop reason: HOSPADM

## 2018-10-26 RX ORDER — SODIUM CHLORIDE 0.9 % (FLUSH) 0.9 %
10 SYRINGE (ML) INJECTION PRN
Status: DISCONTINUED | OUTPATIENT
Start: 2018-10-26 | End: 2018-10-31 | Stop reason: HOSPADM

## 2018-10-26 RX ORDER — HYDRALAZINE HYDROCHLORIDE 20 MG/ML
5 INJECTION INTRAMUSCULAR; INTRAVENOUS EVERY 10 MIN PRN
Status: DISCONTINUED | OUTPATIENT
Start: 2018-10-26 | End: 2018-10-26 | Stop reason: HOSPADM

## 2018-10-26 RX ORDER — DOCUSATE SODIUM 100 MG/1
100 CAPSULE, LIQUID FILLED ORAL 2 TIMES DAILY
Status: DISCONTINUED | OUTPATIENT
Start: 2018-10-26 | End: 2018-10-31 | Stop reason: HOSPADM

## 2018-10-26 RX ORDER — OXYCODONE HYDROCHLORIDE 5 MG/1
10 TABLET ORAL EVERY 4 HOURS PRN
Status: DISCONTINUED | OUTPATIENT
Start: 2018-10-26 | End: 2018-10-31 | Stop reason: HOSPADM

## 2018-10-26 RX ORDER — OXYCODONE HYDROCHLORIDE 5 MG/1
5 TABLET ORAL PRN
Status: DISCONTINUED | OUTPATIENT
Start: 2018-10-26 | End: 2018-10-26 | Stop reason: HOSPADM

## 2018-10-26 RX ORDER — MORPHINE SULFATE 4 MG/ML
1 INJECTION, SOLUTION INTRAMUSCULAR; INTRAVENOUS EVERY 5 MIN PRN
Status: DISCONTINUED | OUTPATIENT
Start: 2018-10-26 | End: 2018-10-26 | Stop reason: HOSPADM

## 2018-10-26 RX ORDER — FENTANYL CITRATE 50 UG/ML
INJECTION, SOLUTION INTRAMUSCULAR; INTRAVENOUS
Status: COMPLETED
Start: 2018-10-26 | End: 2018-10-26

## 2018-10-26 RX ORDER — ALBUTEROL SULFATE 2.5 MG/3ML
2.5 SOLUTION RESPIRATORY (INHALATION) ONCE
Status: COMPLETED | OUTPATIENT
Start: 2018-10-26 | End: 2018-10-26

## 2018-10-26 RX ORDER — ONDANSETRON 2 MG/ML
INJECTION INTRAMUSCULAR; INTRAVENOUS PRN
Status: DISCONTINUED | OUTPATIENT
Start: 2018-10-26 | End: 2018-10-26 | Stop reason: SDUPTHER

## 2018-10-26 RX ORDER — MONTELUKAST SODIUM 10 MG/1
10 TABLET ORAL NIGHTLY
Status: DISCONTINUED | OUTPATIENT
Start: 2018-10-26 | End: 2018-10-31 | Stop reason: HOSPADM

## 2018-10-26 RX ORDER — TRAZODONE HYDROCHLORIDE 50 MG/1
50 TABLET ORAL NIGHTLY
Status: DISCONTINUED | OUTPATIENT
Start: 2018-10-26 | End: 2018-10-31 | Stop reason: HOSPADM

## 2018-10-26 RX ORDER — LEVOTHYROXINE SODIUM 0.1 MG/1
100 TABLET ORAL DAILY
Status: DISCONTINUED | OUTPATIENT
Start: 2018-10-27 | End: 2018-10-31 | Stop reason: HOSPADM

## 2018-10-26 RX ORDER — MIDAZOLAM HYDROCHLORIDE 1 MG/ML
INJECTION INTRAMUSCULAR; INTRAVENOUS
Status: COMPLETED
Start: 2018-10-26 | End: 2018-10-26

## 2018-10-26 RX ORDER — ATORVASTATIN CALCIUM 20 MG/1
40 TABLET, FILM COATED ORAL NIGHTLY
Status: DISCONTINUED | OUTPATIENT
Start: 2018-10-26 | End: 2018-10-31 | Stop reason: HOSPADM

## 2018-10-26 RX ORDER — AMLODIPINE BESYLATE 10 MG/1
10 TABLET ORAL DAILY
Status: DISCONTINUED | OUTPATIENT
Start: 2018-10-27 | End: 2018-10-31 | Stop reason: HOSPADM

## 2018-10-26 RX ORDER — SODIUM CHLORIDE 9 MG/ML
INJECTION, SOLUTION INTRAVENOUS CONTINUOUS
Status: DISCONTINUED | OUTPATIENT
Start: 2018-10-26 | End: 2018-10-31 | Stop reason: HOSPADM

## 2018-10-26 RX ORDER — ASPIRIN 81 MG/1
81 TABLET ORAL DAILY
Status: DISCONTINUED | OUTPATIENT
Start: 2018-10-27 | End: 2018-10-31 | Stop reason: HOSPADM

## 2018-10-26 RX ORDER — METOCLOPRAMIDE HYDROCHLORIDE 5 MG/ML
10 INJECTION INTRAMUSCULAR; INTRAVENOUS
Status: DISCONTINUED | OUTPATIENT
Start: 2018-10-26 | End: 2018-10-26 | Stop reason: HOSPADM

## 2018-10-26 RX ORDER — BENZTROPINE MESYLATE 1 MG/1
0.5 TABLET ORAL NIGHTLY
Status: DISCONTINUED | OUTPATIENT
Start: 2018-10-26 | End: 2018-10-31 | Stop reason: HOSPADM

## 2018-10-26 RX ORDER — ROPINIROLE 2 MG/1
4 TABLET, FILM COATED ORAL DAILY
Status: DISCONTINUED | OUTPATIENT
Start: 2018-10-27 | End: 2018-10-31 | Stop reason: HOSPADM

## 2018-10-26 RX ORDER — OXYCODONE HYDROCHLORIDE 5 MG/1
5 TABLET ORAL EVERY 4 HOURS PRN
Status: DISCONTINUED | OUTPATIENT
Start: 2018-10-26 | End: 2018-10-31 | Stop reason: HOSPADM

## 2018-10-26 RX ORDER — DEXAMETHASONE SODIUM PHOSPHATE 4 MG/ML
INJECTION, SOLUTION INTRA-ARTICULAR; INTRALESIONAL; INTRAMUSCULAR; INTRAVENOUS; SOFT TISSUE PRN
Status: DISCONTINUED | OUTPATIENT
Start: 2018-10-26 | End: 2018-10-26 | Stop reason: SDUPTHER

## 2018-10-26 RX ORDER — QUETIAPINE FUMARATE 25 MG/1
25 TABLET, FILM COATED ORAL NIGHTLY
Status: DISCONTINUED | OUTPATIENT
Start: 2018-10-26 | End: 2018-10-31 | Stop reason: HOSPADM

## 2018-10-26 RX ORDER — MORPHINE SULFATE 2 MG/ML
4 INJECTION, SOLUTION INTRAMUSCULAR; INTRAVENOUS
Status: DISPENSED | OUTPATIENT
Start: 2018-10-26 | End: 2018-10-28

## 2018-10-26 RX ORDER — MORPHINE SULFATE 2 MG/ML
2 INJECTION, SOLUTION INTRAMUSCULAR; INTRAVENOUS
Status: DISPENSED | OUTPATIENT
Start: 2018-10-26 | End: 2018-10-28

## 2018-10-26 RX ORDER — OXYCODONE HYDROCHLORIDE 5 MG/1
10 TABLET ORAL PRN
Status: DISCONTINUED | OUTPATIENT
Start: 2018-10-26 | End: 2018-10-26 | Stop reason: HOSPADM

## 2018-10-26 RX ORDER — ROCURONIUM BROMIDE 10 MG/ML
INJECTION, SOLUTION INTRAVENOUS PRN
Status: DISCONTINUED | OUTPATIENT
Start: 2018-10-26 | End: 2018-10-26 | Stop reason: SDUPTHER

## 2018-10-26 RX ORDER — GABAPENTIN 300 MG/1
300 CAPSULE ORAL 3 TIMES DAILY
Status: DISCONTINUED | OUTPATIENT
Start: 2018-10-26 | End: 2018-10-31 | Stop reason: HOSPADM

## 2018-10-26 RX ORDER — CLOPIDOGREL BISULFATE 75 MG/1
75 TABLET ORAL DAILY
Status: DISCONTINUED | OUTPATIENT
Start: 2018-10-27 | End: 2018-10-31 | Stop reason: HOSPADM

## 2018-10-26 RX ADMIN — SODIUM CHLORIDE, SODIUM LACTATE, POTASSIUM CHLORIDE, AND CALCIUM CHLORIDE: 600; 310; 30; 20 INJECTION, SOLUTION INTRAVENOUS at 17:55

## 2018-10-26 RX ADMIN — ALBUTEROL SULFATE 2.5 MG: 2.5 SOLUTION RESPIRATORY (INHALATION) at 14:20

## 2018-10-26 RX ADMIN — GABAPENTIN 300 MG: 300 CAPSULE ORAL at 21:30

## 2018-10-26 RX ADMIN — TRAZODONE HYDROCHLORIDE 50 MG: 50 TABLET ORAL at 21:31

## 2018-10-26 RX ADMIN — BUPIVACAINE 10 ML: 13.3 INJECTION, SUSPENSION, LIPOSOMAL INFILTRATION at 14:45

## 2018-10-26 RX ADMIN — SODIUM CHLORIDE, SODIUM LACTATE, POTASSIUM CHLORIDE, AND CALCIUM CHLORIDE: 600; 310; 30; 20 INJECTION, SOLUTION INTRAVENOUS at 12:22

## 2018-10-26 RX ADMIN — VANCOMYCIN HYDROCHLORIDE 1000 MG: 10 INJECTION, POWDER, LYOPHILIZED, FOR SOLUTION INTRAVENOUS at 14:05

## 2018-10-26 RX ADMIN — METOPROLOL TARTRATE 25 MG: 25 TABLET ORAL at 21:31

## 2018-10-26 RX ADMIN — PHENYLEPHRINE HYDROCHLORIDE 80 MCG: 10 INJECTION, SOLUTION INTRAMUSCULAR; INTRAVENOUS; SUBCUTANEOUS at 16:41

## 2018-10-26 RX ADMIN — SODIUM CHLORIDE, SODIUM LACTATE, POTASSIUM CHLORIDE, AND CALCIUM CHLORIDE: 600; 310; 30; 20 INJECTION, SOLUTION INTRAVENOUS at 14:55

## 2018-10-26 RX ADMIN — FAMOTIDINE 20 MG: 10 INJECTION, SOLUTION INTRAVENOUS at 17:35

## 2018-10-26 RX ADMIN — BUPIVACAINE HYDROCHLORIDE 10 ML: 5 INJECTION, SOLUTION EPIDURAL; INTRACAUDAL; PERINEURAL at 14:45

## 2018-10-26 RX ADMIN — SODIUM CHLORIDE, SODIUM LACTATE, POTASSIUM CHLORIDE, AND CALCIUM CHLORIDE: 600; 310; 30; 20 INJECTION, SOLUTION INTRAVENOUS at 16:05

## 2018-10-26 RX ADMIN — MIDAZOLAM HYDROCHLORIDE 2 MG: 1 INJECTION INTRAMUSCULAR; INTRAVENOUS at 14:59

## 2018-10-26 RX ADMIN — PHENYLEPHRINE HYDROCHLORIDE 80 MCG: 10 INJECTION, SOLUTION INTRAMUSCULAR; INTRAVENOUS; SUBCUTANEOUS at 16:53

## 2018-10-26 RX ADMIN — CARBIDOPA AND LEVODOPA 3 TABLET: 25; 100 TABLET ORAL at 14:00

## 2018-10-26 RX ADMIN — ROCURONIUM BROMIDE 50 MG: 10 INJECTION, SOLUTION INTRAVENOUS at 15:02

## 2018-10-26 RX ADMIN — BENZTROPINE MESYLATE 0.5 MG: 1 TABLET ORAL at 21:31

## 2018-10-26 RX ADMIN — SODIUM CHLORIDE: 9 INJECTION, SOLUTION INTRAVENOUS at 21:00

## 2018-10-26 RX ADMIN — SUGAMMADEX 200 MG: 100 INJECTION, SOLUTION INTRAVENOUS at 17:58

## 2018-10-26 RX ADMIN — DEXAMETHASONE SODIUM PHOSPHATE 4 MG: 4 INJECTION, SOLUTION INTRAMUSCULAR; INTRAVENOUS at 17:35

## 2018-10-26 RX ADMIN — LURASIDONE HYDROCHLORIDE 60 MG: 40 TABLET, FILM COATED ORAL at 21:35

## 2018-10-26 RX ADMIN — PHENYLEPHRINE HYDROCHLORIDE 80 MCG: 10 INJECTION, SOLUTION INTRAMUSCULAR; INTRAVENOUS; SUBCUTANEOUS at 16:10

## 2018-10-26 RX ADMIN — ONDANSETRON 4 MG: 2 INJECTION INTRAMUSCULAR; INTRAVENOUS at 17:35

## 2018-10-26 RX ADMIN — CARBIDOPA AND LEVODOPA 3 TABLET: 25; 100 TABLET ORAL at 21:30

## 2018-10-26 RX ADMIN — PHENYLEPHRINE HYDROCHLORIDE 80 MCG: 10 INJECTION, SOLUTION INTRAMUSCULAR; INTRAVENOUS; SUBCUTANEOUS at 16:29

## 2018-10-26 RX ADMIN — PROPOFOL 100 MG: 10 INJECTION, EMULSION INTRAVENOUS at 15:02

## 2018-10-26 RX ADMIN — MONTELUKAST SODIUM 10 MG: 10 TABLET, COATED ORAL at 21:31

## 2018-10-26 RX ADMIN — LIDOCAINE HYDROCHLORIDE 100 MG: 20 INJECTION, SOLUTION INTRAVENOUS at 15:02

## 2018-10-26 RX ADMIN — PHENYLEPHRINE HYDROCHLORIDE 80 MCG: 10 INJECTION, SOLUTION INTRAMUSCULAR; INTRAVENOUS; SUBCUTANEOUS at 16:02

## 2018-10-26 RX ADMIN — MIDAZOLAM HYDROCHLORIDE 2 MG: 1 INJECTION INTRAMUSCULAR; INTRAVENOUS at 14:40

## 2018-10-26 RX ADMIN — FENTANYL CITRATE 100 MCG: 50 INJECTION INTRAMUSCULAR; INTRAVENOUS at 14:40

## 2018-10-26 RX ADMIN — CEFTRIAXONE SODIUM 2 G: 2 INJECTION, POWDER, FOR SOLUTION INTRAMUSCULAR; INTRAVENOUS at 15:13

## 2018-10-26 RX ADMIN — QUETIAPINE FUMARATE 25 MG: 25 TABLET ORAL at 21:30

## 2018-10-26 RX ADMIN — ATORVASTATIN CALCIUM 40 MG: 20 TABLET, FILM COATED ORAL at 21:31

## 2018-10-26 ASSESSMENT — PULMONARY FUNCTION TESTS
PIF_VALUE: 20
PIF_VALUE: 15
PIF_VALUE: 21
PIF_VALUE: 21
PIF_VALUE: 22
PIF_VALUE: 21
PIF_VALUE: 21
PIF_VALUE: 15
PIF_VALUE: 4
PIF_VALUE: 13
PIF_VALUE: 21
PIF_VALUE: 21
PIF_VALUE: 22
PIF_VALUE: 21
PIF_VALUE: 22
PIF_VALUE: 21
PIF_VALUE: 23
PIF_VALUE: 21
PIF_VALUE: 15
PIF_VALUE: 5
PIF_VALUE: 23
PIF_VALUE: 15
PIF_VALUE: 22
PIF_VALUE: 21
PIF_VALUE: 22
PIF_VALUE: 26
PIF_VALUE: 21
PIF_VALUE: 21
PIF_VALUE: 13
PIF_VALUE: 21
PIF_VALUE: 23
PIF_VALUE: 21
PIF_VALUE: 22
PIF_VALUE: 13
PIF_VALUE: 22
PIF_VALUE: 8
PIF_VALUE: 9
PIF_VALUE: 23
PIF_VALUE: 21
PIF_VALUE: 15
PIF_VALUE: 22
PIF_VALUE: 21
PIF_VALUE: 21
PIF_VALUE: 22
PIF_VALUE: 13
PIF_VALUE: 21
PIF_VALUE: 15
PIF_VALUE: 13
PIF_VALUE: 21
PIF_VALUE: 22
PIF_VALUE: 15
PIF_VALUE: 22
PIF_VALUE: 27
PIF_VALUE: 22
PIF_VALUE: 22
PIF_VALUE: 23
PIF_VALUE: 22
PIF_VALUE: 21
PIF_VALUE: 22
PIF_VALUE: 22
PIF_VALUE: 21
PIF_VALUE: 22
PIF_VALUE: 20
PIF_VALUE: 22
PIF_VALUE: 21
PIF_VALUE: 21
PIF_VALUE: 22
PIF_VALUE: 16
PIF_VALUE: 23
PIF_VALUE: 23
PIF_VALUE: 22
PIF_VALUE: 16
PIF_VALUE: 22
PIF_VALUE: 21
PIF_VALUE: 20
PIF_VALUE: 22
PIF_VALUE: 23
PIF_VALUE: 13
PIF_VALUE: 21
PIF_VALUE: 15
PIF_VALUE: 22
PIF_VALUE: 13
PIF_VALUE: 21
PIF_VALUE: 22
PIF_VALUE: 21
PIF_VALUE: 13
PIF_VALUE: 23
PIF_VALUE: 21
PIF_VALUE: 22
PIF_VALUE: 13
PIF_VALUE: 22
PIF_VALUE: 21
PIF_VALUE: 13
PIF_VALUE: 22
PIF_VALUE: 21
PIF_VALUE: 22
PIF_VALUE: 13
PIF_VALUE: 22
PIF_VALUE: 20
PIF_VALUE: 21
PIF_VALUE: 21
PIF_VALUE: 22
PIF_VALUE: 21
PIF_VALUE: 15
PIF_VALUE: 22
PIF_VALUE: 21
PIF_VALUE: 21
PIF_VALUE: 22
PIF_VALUE: 23
PIF_VALUE: 21
PIF_VALUE: 13
PIF_VALUE: 23
PIF_VALUE: 22
PIF_VALUE: 20
PIF_VALUE: 21
PIF_VALUE: 13
PIF_VALUE: 21
PIF_VALUE: 22
PIF_VALUE: 22
PIF_VALUE: 21
PIF_VALUE: 22
PIF_VALUE: 21
PIF_VALUE: 21
PIF_VALUE: 22
PIF_VALUE: 13
PIF_VALUE: 1
PIF_VALUE: 23
PIF_VALUE: 23
PIF_VALUE: 21
PIF_VALUE: 21
PIF_VALUE: 22
PIF_VALUE: 22
PIF_VALUE: 21
PIF_VALUE: 21
PIF_VALUE: 22
PIF_VALUE: 21
PIF_VALUE: 21
PIF_VALUE: 20
PIF_VALUE: 21
PIF_VALUE: 20
PIF_VALUE: 20
PIF_VALUE: 21
PIF_VALUE: 22

## 2018-10-26 ASSESSMENT — PAIN - FUNCTIONAL ASSESSMENT: PAIN_FUNCTIONAL_ASSESSMENT: 0-10

## 2018-10-26 ASSESSMENT — PAIN SCALES - GENERAL
PAINLEVEL_OUTOF10: 0

## 2018-10-26 ASSESSMENT — COPD QUESTIONNAIRES: CAT_SEVERITY: SEVERE

## 2018-10-26 ASSESSMENT — PAIN DESCRIPTION - DESCRIPTORS: DESCRIPTORS: ACHING

## 2018-10-26 NOTE — ANESTHESIA PROCEDURE NOTES
Peripheral Block    Patient location during procedure: pre-op  Start time: 10/26/2018 2:40 PM  End time: 10/26/2018 2:49 PM  Staffing  Anesthesiologist: Valentin Johnson  Performed: anesthesiologist   Preanesthetic Checklist  Completed: patient identified, site marked, surgical consent, pre-op evaluation, timeout performed, IV checked, risks and benefits discussed, monitors and equipment checked, anesthesia consent given, oxygen available and patient being monitored  Peripheral Block  Patient position: sitting  Prep: ChloraPrep  Patient monitoring: cardiac monitor, continuous pulse ox, frequent blood pressure checks and IV access  Block type: Brachial plexus  Laterality: right  Injection technique: single-shot  Procedures: ultrasound guided  Interscalene  Provider prep: mask and sterile gloves  Needle  Needle type: short-bevel   Needle gauge: 22 G  Needle length: 8 cm  Needle localization: ultrasound guidance  Assessment  Injection assessment: negative aspiration for heme, no paresthesia on injection and local visualized surrounding nerve on ultrasound  Paresthesia pain: none  Slow fractionated injection: yes  Hemodynamics: stable  Additional Notes  Right Ultrasound-Guided Interscalene Brachial Plexus Block    Indication: Postoperative analgesia upon request of the attending surgeon. Procedure: Informed consent obtained and pre-procedural timeout procedure performed. Patient supine in semi-upright position. Landmarks identified. Sterile prep. Right brachial plexus was identified using an ultrasound probe and an 80mm 22G insulated regional block needle was inserted posterior to the right interscalene groove at the level of the C6 tubercle and directed in an anterior manner toward the brachial plexus. The needle was visualized on ultrasound as it entered the plexus sheath.   Bupivacaine 0.5%-10cc plus Exparel-10cc plus 0.9% Normal Saline-10cc were mixed together and injected in 5cc increments to a total volume

## 2018-10-26 NOTE — PROGRESS NOTES
When he interviewed her he discussed the possibility of ETT staying in and not rushing removal till more alert and staying in ICU. Brother in room at the time.  Noel explained and he reviewed note from pulmonologist.

## 2018-10-26 NOTE — ANESTHESIA POSTPROCEDURE EVALUATION
Department of Anesthesiology  Postprocedure Note    Patient: Harry Brandt  MRN: [id number]  YOB: 1955  Date of evaluation: 10/26/2018  Time:  7:51 PM     Procedure Summary     Date:  10/26/18 Room / Location:  [facility] OR 09 / [facility] OR    Anesthesia Start:  0879 Anesthesia Stop:  7603    Procedure:  RIGHT SHOULDER OPEN REDUCTION INTERNAL FIXATION PROXIMAL HUMERUS WITH TIBIAL ALLOGRAFT STRUT AND DBX MIX; LYSIS OF ADHESIONS; OPEN BICEPS TENODESIS, RIGHT SHOULDER ARTHROTOMY (Right Shoulder) Diagnosis:  (RIGHT PROXIMAL HUMERUS FRACTURE)    Surgeon:  Vee Chen MD Responsible Provider:  Nga Trammell MD    Anesthesia Type:  general ASA Status:  3          Anesthesia Type: general    Chary Phase I: Chary Score: 8    Chary Phase II:      Last vitals: Reviewed and per EMR flowsheets.        Anesthesia Post Evaluation    Patient location during evaluation: PACU  Patient participation: complete - patient participated  Level of consciousness: awake and alert  Pain score: 0  Airway patency: patent  Nausea & Vomiting: no nausea and no vomiting  Complications: no  Cardiovascular status: hemodynamically stable  Respiratory status: acceptable  Hydration status: euvolemic

## 2018-10-26 NOTE — H&P
H&P and surgical procedure reviewed with patient  NPO confirmed  Surgical site(s) marked  Ready for the OR

## 2018-10-26 NOTE — ANESTHESIA PRE PROCEDURE
times daily    Yes Historical Provider, MD   montelukast (SINGULAIR) 10 MG tablet Take 10 mg by mouth nightly    Yes Historical Provider, MD   rOPINIRole (REQUIP) 4 MG tablet TAKE 1 TABLET EVERY DAY 5/31/17  Yes Historical Provider, MD   VORTIoxetine (TRINTELLIX) 10 MG TABS tablet Take 10 mg by mouth daily    Yes Historical Provider, MD   linagliptin (TRADJENTA) 5 MG tablet Take 5 mg by mouth daily    Yes Historical Provider, MD   traZODone (DESYREL) 50 MG tablet Take 50 mg by mouth nightly   Yes Historical Provider, MD   Roflumilast (DALIRESP) 250 MCG TABS Take 1 tablet by mouth daily    Historical Provider, MD   aspirin (ASPIR-LOW) 81 MG EC tablet Take 81 mg by mouth daily     Historical Provider, MD   clopidogrel (PLAVIX) 75 MG tablet Take 75 mg by mouth    Historical Provider, MD       Current medications:    Current Facility-Administered Medications   Medication Dose Route Frequency Provider Last Rate Last Dose    lactated ringers infusion   Intravenous Continuous Lenice Rist,  mL/hr at 10/26/18 1222      cefTRIAXone (ROCEPHIN) 2 g IVPB in D5W 50ml minibag  2 g Intravenous Once Samer Taya Keating MD        lactated ringers infusion   Intravenous Continuous Samer Taya Keating MD        vancomycin (VANCOCIN) 1,000 mg in dextrose 5 % 250 mL IVPB  1,000 mg Intravenous Once Davonte Prado MD        HYDROmorphone (DILAUDID) injection 0.25 mg  0.25 mg Intravenous Q5 Min PRN Faby Lopez MD        HYDROmorphone (DILAUDID) injection 0.5 mg  0.5 mg Intravenous Q5 Min PRN Faby Lopez MD        morphine injection 1 mg  1 mg Intravenous Q5 Min PRN Faby Lopez MD        HYDROmorphone (DILAUDID) injection 0.5 mg  0.5 mg Intravenous Q5 Min PRN Faby Lopez MD        oxyCODONE (ROXICODONE) immediate release tablet 5 mg  5 mg Oral PRN Faby Lopez MD        Or    oxyCODONE (ROXICODONE) immediate release tablet 10 mg  10 mg Oral PRN Faby Lopez MD        diphenhydrAMINE (BENADRYL)

## 2018-10-27 LAB
GLUCOSE BLD-MCNC: 139 MG/DL (ref 70–99)
GLUCOSE BLD-MCNC: 332 MG/DL (ref 70–99)
GLUCOSE BLD-MCNC: 349 MG/DL (ref 70–99)
GLUCOSE BLD-MCNC: 409 MG/DL (ref 70–99)
PERFORMED ON: ABNORMAL

## 2018-10-27 PROCEDURE — 97535 SELF CARE MNGMENT TRAINING: CPT

## 2018-10-27 PROCEDURE — 94761 N-INVAS EAR/PLS OXIMETRY MLT: CPT

## 2018-10-27 PROCEDURE — 1200000000 HC SEMI PRIVATE

## 2018-10-27 PROCEDURE — G8988 SELF CARE GOAL STATUS: HCPCS

## 2018-10-27 PROCEDURE — 94640 AIRWAY INHALATION TREATMENT: CPT

## 2018-10-27 PROCEDURE — 6360000002 HC RX W HCPCS: Performed by: ORTHOPAEDIC SURGERY

## 2018-10-27 PROCEDURE — 6370000000 HC RX 637 (ALT 250 FOR IP): Performed by: ORTHOPAEDIC SURGERY

## 2018-10-27 PROCEDURE — 97166 OT EVAL MOD COMPLEX 45 MIN: CPT

## 2018-10-27 PROCEDURE — G8987 SELF CARE CURRENT STATUS: HCPCS

## 2018-10-27 PROCEDURE — 94664 DEMO&/EVAL PT USE INHALER: CPT

## 2018-10-27 PROCEDURE — 2700000000 HC OXYGEN THERAPY PER DAY

## 2018-10-27 PROCEDURE — 6370000000 HC RX 637 (ALT 250 FOR IP): Performed by: INTERNAL MEDICINE

## 2018-10-27 PROCEDURE — 2580000003 HC RX 258: Performed by: ORTHOPAEDIC SURGERY

## 2018-10-27 PROCEDURE — 94150 VITAL CAPACITY TEST: CPT

## 2018-10-27 RX ORDER — NICOTINE POLACRILEX 4 MG
15 LOZENGE BUCCAL PRN
Status: DISCONTINUED | OUTPATIENT
Start: 2018-10-27 | End: 2018-10-31 | Stop reason: HOSPADM

## 2018-10-27 RX ORDER — DEXTROSE MONOHYDRATE 25 G/50ML
12.5 INJECTION, SOLUTION INTRAVENOUS PRN
Status: DISCONTINUED | OUTPATIENT
Start: 2018-10-27 | End: 2018-10-31 | Stop reason: HOSPADM

## 2018-10-27 RX ORDER — DEXTROSE MONOHYDRATE 50 MG/ML
100 INJECTION, SOLUTION INTRAVENOUS PRN
Status: DISCONTINUED | OUTPATIENT
Start: 2018-10-27 | End: 2018-10-31 | Stop reason: HOSPADM

## 2018-10-27 RX ORDER — ALBUTEROL SULFATE 2.5 MG/3ML
2.5 SOLUTION RESPIRATORY (INHALATION) EVERY 4 HOURS PRN
Status: DISCONTINUED | OUTPATIENT
Start: 2018-10-27 | End: 2018-10-28

## 2018-10-27 RX ORDER — PSEUDOEPHEDRINE HCL 30 MG
100 TABLET ORAL 2 TIMES DAILY
Qty: 20 CAPSULE | Refills: 0 | Status: SHIPPED | OUTPATIENT
Start: 2018-10-27 | End: 2019-02-05 | Stop reason: ALTCHOICE

## 2018-10-27 RX ORDER — CYCLOBENZAPRINE HCL 10 MG
10 TABLET ORAL 3 TIMES DAILY PRN
Status: DISCONTINUED | OUTPATIENT
Start: 2018-10-27 | End: 2018-10-31 | Stop reason: HOSPADM

## 2018-10-27 RX ORDER — OXYCODONE HYDROCHLORIDE 5 MG/1
5 TABLET ORAL EVERY 4 HOURS PRN
Qty: 42 TABLET | Refills: 0 | Status: SHIPPED | OUTPATIENT
Start: 2018-10-27 | End: 2018-11-03

## 2018-10-27 RX ADMIN — GABAPENTIN 300 MG: 300 CAPSULE ORAL at 07:58

## 2018-10-27 RX ADMIN — CLOPIDOGREL BISULFATE 75 MG: 75 TABLET ORAL at 07:58

## 2018-10-27 RX ADMIN — ATORVASTATIN CALCIUM 40 MG: 20 TABLET, FILM COATED ORAL at 21:25

## 2018-10-27 RX ADMIN — BENZTROPINE MESYLATE 0.5 MG: 1 TABLET ORAL at 21:24

## 2018-10-27 RX ADMIN — INSULIN GLARGINE 41 UNITS: 100 INJECTION, SOLUTION SUBCUTANEOUS at 12:28

## 2018-10-27 RX ADMIN — TRAZODONE HYDROCHLORIDE 50 MG: 50 TABLET ORAL at 21:24

## 2018-10-27 RX ADMIN — METOPROLOL TARTRATE 25 MG: 25 TABLET ORAL at 07:58

## 2018-10-27 RX ADMIN — INSULIN LISPRO 6 UNITS: 100 INJECTION, SOLUTION INTRAVENOUS; SUBCUTANEOUS at 12:29

## 2018-10-27 RX ADMIN — MONTELUKAST SODIUM 10 MG: 10 TABLET, COATED ORAL at 21:25

## 2018-10-27 RX ADMIN — AMLODIPINE BESYLATE 10 MG: 10 TABLET ORAL at 07:58

## 2018-10-27 RX ADMIN — METOPROLOL TARTRATE 25 MG: 25 TABLET ORAL at 21:24

## 2018-10-27 RX ADMIN — LINAGLIPTIN 5 MG: 5 TABLET, FILM COATED ORAL at 07:59

## 2018-10-27 RX ADMIN — LEVOTHYROXINE SODIUM 100 MCG: 100 TABLET ORAL at 06:14

## 2018-10-27 RX ADMIN — MORPHINE SULFATE 4 MG: 2 INJECTION, SOLUTION INTRAMUSCULAR; INTRAVENOUS at 12:40

## 2018-10-27 RX ADMIN — CARBIDOPA AND LEVODOPA 3 TABLET: 25; 100 TABLET ORAL at 15:20

## 2018-10-27 RX ADMIN — OXYCODONE HYDROCHLORIDE 10 MG: 5 TABLET ORAL at 15:20

## 2018-10-27 RX ADMIN — CYCLOBENZAPRINE HYDROCHLORIDE 10 MG: 10 TABLET, FILM COATED ORAL at 12:27

## 2018-10-27 RX ADMIN — LURASIDONE HYDROCHLORIDE 60 MG: 40 TABLET, FILM COATED ORAL at 21:25

## 2018-10-27 RX ADMIN — GABAPENTIN 300 MG: 300 CAPSULE ORAL at 15:20

## 2018-10-27 RX ADMIN — VORTIOXETINE 10 MG: 10 TABLET, FILM COATED ORAL at 07:59

## 2018-10-27 RX ADMIN — GABAPENTIN 300 MG: 300 CAPSULE ORAL at 21:25

## 2018-10-27 RX ADMIN — ALBUTEROL SULFATE 2.5 MG: 2.5 SOLUTION RESPIRATORY (INHALATION) at 09:29

## 2018-10-27 RX ADMIN — CARBIDOPA AND LEVODOPA 3 TABLET: 25; 100 TABLET ORAL at 07:58

## 2018-10-27 RX ADMIN — INSULIN LISPRO 4 UNITS: 100 INJECTION, SOLUTION INTRAVENOUS; SUBCUTANEOUS at 17:46

## 2018-10-27 RX ADMIN — ASPIRIN 81 MG: 81 TABLET, COATED ORAL at 07:58

## 2018-10-27 RX ADMIN — VANCOMYCIN HYDROCHLORIDE 1000 MG: 10 INJECTION, POWDER, LYOPHILIZED, FOR SOLUTION INTRAVENOUS at 03:22

## 2018-10-27 RX ADMIN — OXYCODONE HYDROCHLORIDE 10 MG: 5 TABLET ORAL at 21:24

## 2018-10-27 RX ADMIN — DOCUSATE SODIUM 100 MG: 100 CAPSULE, LIQUID FILLED ORAL at 21:25

## 2018-10-27 RX ADMIN — CARBIDOPA AND LEVODOPA 3 TABLET: 25; 100 TABLET ORAL at 21:24

## 2018-10-27 RX ADMIN — Medication 10 ML: at 07:59

## 2018-10-27 RX ADMIN — Medication 10 ML: at 21:26

## 2018-10-27 RX ADMIN — MORPHINE SULFATE 4 MG: 2 INJECTION, SOLUTION INTRAMUSCULAR; INTRAVENOUS at 16:30

## 2018-10-27 RX ADMIN — QUETIAPINE FUMARATE 25 MG: 25 TABLET ORAL at 21:24

## 2018-10-27 ASSESSMENT — PAIN DESCRIPTION - DESCRIPTORS
DESCRIPTORS: ACHING;CONSTANT
DESCRIPTORS: ACHING

## 2018-10-27 ASSESSMENT — PAIN DESCRIPTION - ORIENTATION
ORIENTATION: LOWER
ORIENTATION: RIGHT

## 2018-10-27 ASSESSMENT — PAIN DESCRIPTION - ONSET
ONSET: ON-GOING

## 2018-10-27 ASSESSMENT — PAIN SCALES - GENERAL
PAINLEVEL_OUTOF10: 7
PAINLEVEL_OUTOF10: 9
PAINLEVEL_OUTOF10: 8
PAINLEVEL_OUTOF10: 9
PAINLEVEL_OUTOF10: 8
PAINLEVEL_OUTOF10: 10
PAINLEVEL_OUTOF10: 7
PAINLEVEL_OUTOF10: 10

## 2018-10-27 ASSESSMENT — PAIN DESCRIPTION - PAIN TYPE
TYPE: SURGICAL PAIN
TYPE: SURGICAL PAIN
TYPE: CHRONIC PAIN
TYPE: SURGICAL PAIN
TYPE: ACUTE PAIN;SURGICAL PAIN

## 2018-10-27 ASSESSMENT — PAIN DESCRIPTION - LOCATION
LOCATION: SHOULDER
LOCATION: SHOULDER
LOCATION: BACK;KNEE
LOCATION: SHOULDER
LOCATION: SHOULDER

## 2018-10-27 ASSESSMENT — PAIN DESCRIPTION - FREQUENCY
FREQUENCY: CONTINUOUS

## 2018-10-27 ASSESSMENT — PAIN DESCRIPTION - PROGRESSION
CLINICAL_PROGRESSION: GRADUALLY WORSENING
CLINICAL_PROGRESSION: GRADUALLY WORSENING
CLINICAL_PROGRESSION: NOT CHANGED
CLINICAL_PROGRESSION: GRADUALLY WORSENING
CLINICAL_PROGRESSION: NOT CHANGED

## 2018-10-27 NOTE — CONSULTS
reviewed the EKG with the following interpretation:   Sinus tachycardiaPossible Left atrial enlargementRight bundle branch blockSeptal infarct , age undetermined. Unable to review images of EKG    XR HUMERUS RIGHT (MIN 2 VIEWS)   Final Result      Intraoperative fluoroscopy. Refer to operative note for details. FLUORO FOR SURGICAL PROCEDURES   Final Result      Intraoperative fluoroscopy. Refer to operative note for details. ASSESSMENT/PLAN[de-identified]    Active Hospital Problems    Diagnosis Date Noted    Closed 3-part fracture of proximal humerus, right, initial encounter Annita Hodges 10/26/2018   - S/p surgical intevention    HTN:   - Continue HTN, amlodipine  - She is anxious and in pain  - Will check BP in an hour after morning anti hypertensive medications and then reevaluate if needs further intervention    IDDM:   - Continue 41 units lantus and SSI  - Ok to continue on pta regimen on discharge as BG seem to be in good control at home    COPD and Asthma:   - Severe COPD with FEV1 41%, not in exacerbation at this time  - continue home nebulizers  - Continue breathing rx prn    Post operative care:  - IS every hour while awake  - Pain management per primary team but recommend to send patient on home pain medicatiion regimen rather than oxycodone as she has a pain specialist and she says her pain only is controlled with Nucynta and Flexril      DVT Prophylaxis: per primary team  Diet: DIET GENERAL;  Code Status: Full Code    PT/OT Eval Status: following    Dispo - per primary team       Carmelita Gatica MD    Thank you Valeriy Simeon MD for the opportunity to be involved in this patient's care. If you have any questions or concerns please feel free to contact me at [phone]. Addendum 2:48 PM  Patient desated when working with PT.   Will get home O2 evaluation

## 2018-10-27 NOTE — OP NOTE
shoulder surgery. All members of the surgical  team wore body exhaust suits. We had a padded Newberry stand available. We used Moshe Marin as well. C-arm was available throughout the procedure  and used as needed. We used the deltopectoral approach through a 12  cm oblique incision from the coracoid coursing down towards the level  of the deltoid tubercle. The incision was carried down with a skin  knife through the skin and subcutaneous tissue. Electrocautery was  used to establish hemostasis. Gelpi retractor was placed. The  deltopectoral interval was identified and developed using a  combination of blunt and sharp dissection. The cephalic vein was  retracted laterally. We incised the clavipectoral fascia. Meticulous  dissection was carried around the fracture site. We evacuated some of  the early callus and fracture hematoma. We released the subdeltoid  and subacromial adhesions. We repositioned our retractors. We placed  a couple of tag sutures through the rotator cuff proximally to allow  us to lever and manipulate the proximal fragment. We initially tried  to get an in situ reduction, but we were in quite a bit of varus when  we took x-rays, so we elected to use the intramedullary strut, so we  had to hyperextend the shoulder, separate additional callus. This  afforded us a great view of the large metaphyseal comminuted fragment. This was meticulously teased off and then removed and later reduced. We also  some adhesions posteriorly between the head and the  shaft. Finally, we fashioned the tibial strut allograft that had been  soaking in a solution. We cut it in a wedge shaped fashion using a  saw, and then made sure that 2 cm or so, stuck proximal to the  proximal fragment as we inserted it into the intramedullary canal.  We  now could lever the head and slide the proximal fragment with the  allograft sticking out underneath and help with the reduction.   This  provided some good

## 2018-10-27 NOTE — PLAN OF CARE
Problem: Discharge Planning:  Intervention: Assess knowledge level of healthcare  Pt is active with home care and will consult for  to talk to the pt in am for possible home care the pt is currently on with her insurance. Will cont to discuss readiness for discharged to home.

## 2018-10-27 NOTE — PROGRESS NOTES
[address] Note    History of Present Illness:  Rebekah Lauren is a 61 y.o. female who underwent a right shoulder proximal humerus open reduction and internal fixation yesterday. Pain is currently controlled. No events overnight. They deny any shortness of breath, chest pain, dizziness or any other current complaints. Medical History:  Patient's medications, allergies, past medical, surgical, social and family histories were reviewed and updated as appropriate. They are as noted. Social History     Social History    Marital status: Legally      Spouse name: N/A    Number of children: N/A    Years of education: N/A     Occupational History    Not on file. Social History Main Topics    Smoking status: Former Smoker     Years: 40.00     Quit date: 3/6/2014    Smokeless tobacco: Never Used      Comment: still smokes at times    Alcohol use Yes      Comment: rarely    Drug use: No    Sexual activity: Not on file     Other Topics Concern    Not on file     Social History Narrative    No narrative on file       Allergies   Allergen Reactions    Combivent Respimat [Ipratropium-Albuterol]      Nose bleeds  SHE SAID SHE CAN TAKE ALBUTEROL    Ipratropium-Albuterol Other (See Comments)     Nose bleed    Metformin Other (See Comments)     Effects kidneys    Primidone Other (See Comments)     Other reaction(s): Other (See Comments)  Loss of muscle control  COULDN'T USE HER MUSCLES    Quinidine Hives    Sulfa Antibiotics Rash     No current facility-administered medications on file prior to encounter. Current Outpatient Prescriptions on File Prior to Encounter   Medication Sig Dispense Refill    gabapentin (NEURONTIN) 300 MG capsule Take 300 mg by mouth 3 times daily. Kaitlynn Gloria cyclobenzaprine (FLEXERIL) 10 MG tablet Take 10 mg by mouth 3 times daily as needed      benztropine (COGENTIN) 1 MG tablet Take 0.5 mg by mouth daily       insulin except for what was stated above in the history. Vital Signs:  Vitals:    10/27/18 0328   BP: 128/73   Pulse: 79   Resp: 18   Temp: 97.9 °F (36.6 °C)   SpO2: 98%     Height: 5' 1\" (154.9 cm), Weight: 145 lb (65.8 kg), Body mass index is 27.4 kg/m². ,      Physical Exam:     Appearance: Alert, oriented x 3, in no apparent distress, and well groomed. Right shoulder exam: Dressing clean and dry, compartments are soft and compressible, sling in place, axillary sensation intact, median, ulnar, radial, AIN, and PIN are grossly intact to the motor and sensation. ROM limited due to pain and post operative status. Hand is well perfused, +radial pulse. Assessment:  S/p right proximal humerus open reduction and internal fixation, POD #1    Treatment Plan:    Maintain sling  Leave dressing in place  NWB on the operative arm  Ice PRN  Pain control  DVT ppx  OT increase activity as tolerated  Encourage diet/fluids  Follow up as scheduled with PT and Dr. Annamae Phalen  Call for questions: [phone]  Disposition: home today after OT  Patient is in agreement with the plan    Tawnya Guy, [address] [facility] Sports Medicine and [address] [facility]

## 2018-10-27 NOTE — PROGRESS NOTES
Occupational Therapy   Occupational Therapy Initial Assessment and Treatment  Date: 10/27/2018    Patient Name: Bess Hansen  MRN: [id number]     : 1955    Date of Service: 10/27/2018    Discharge Recommendations: Bess Hansen scored a 18/24 on the AM-PAC ADL Inpatient form. Current research shows that an AM-PAC score of 18 or greater is typically associated with a discharge to the patient's home setting. Based on the patients AM-PAC score and their current ADL deficits, it is recommended that the patient have 2-3 sessions per week of Occupational Therapy at d/c to increase the patients independence. OT Equipment Recommendations  Equipment Needed: No      Patient Diagnosis(es): The encounter diagnosis was Closed 3-part fracture of proximal humerus, right, initial encounter. has a past medical history of Anxiety; Arthritis; CAD (coronary artery disease); Chronic back pain; COPD (chronic obstructive pulmonary disease) ([address] Depression; Diabetes mellitus ([address] Fibromyalgia; Full dentures; GERD (gastroesophageal reflux disease); Hyperlipidemia; Hypertension; IBS (irritable bowel syndrome); Kidney failure; PACO (obstructive sleep apnea); Parkinson disease ([address] Restless leg syndrome; Shoulder injury; and Thyroid disease. has a past surgical history that includes Carpal tunnel release (Left); Rotator cuff repair; Hip fracture surgery (Left); Coronary angioplasty with stent; Colonoscopy; Carpal tunnel release (Right, 2018); bronchoscopy (2017); other surgical history (N/A, 2018); and fracture surgery (Left). Treatment Diagnosis: Decreased ADLs, transfers and mobility      Restrictions  Position Activity Restriction  Other position/activity restrictions: Sling post op, ambulate patient    Subjective   General  Chart Reviewed: Yes  Additional Pertinent Hx: 61 y.o. F admitted for R shoulder proximal humerus ORIF on 10/27 per Dr. Bolivar Wick.    PMHx= anxiety, arthritis, chronic back Score: 18  AM-PAC Inpatient ADL T-Scale Score : 38.66  ADL Inpatient CMS 0-100% Score: 46.65  ADL Inpatient CMS G-Code Modifier : CK    Goals  Short term goals  Time Frame for Short term goals: By d/c  Short term goal 1: Pt will don front buttoning shirt and sling w/ min A   Patient Goals   Patient goals :  To return home today        Therapy Time   Individual Concurrent Group Co-treatment   Time In 0930         Time Out 1015         Minutes 45         Timed Code Treatment Minutes:   30    Total Treatment Minutes:  [address]

## 2018-10-28 LAB
GLUCOSE BLD-MCNC: 151 MG/DL (ref 70–99)
GLUCOSE BLD-MCNC: 180 MG/DL (ref 70–99)
GLUCOSE BLD-MCNC: 219 MG/DL (ref 70–99)
GLUCOSE BLD-MCNC: 279 MG/DL (ref 70–99)
PERFORMED ON: ABNORMAL

## 2018-10-28 PROCEDURE — 6370000000 HC RX 637 (ALT 250 FOR IP): Performed by: INTERNAL MEDICINE

## 2018-10-28 PROCEDURE — G8987 SELF CARE CURRENT STATUS: HCPCS

## 2018-10-28 PROCEDURE — 94150 VITAL CAPACITY TEST: CPT

## 2018-10-28 PROCEDURE — 6370000000 HC RX 637 (ALT 250 FOR IP): Performed by: ORTHOPAEDIC SURGERY

## 2018-10-28 PROCEDURE — G8988 SELF CARE GOAL STATUS: HCPCS

## 2018-10-28 PROCEDURE — 97535 SELF CARE MNGMENT TRAINING: CPT

## 2018-10-28 PROCEDURE — 2580000003 HC RX 258: Performed by: ORTHOPAEDIC SURGERY

## 2018-10-28 PROCEDURE — 2700000000 HC OXYGEN THERAPY PER DAY

## 2018-10-28 PROCEDURE — 94761 N-INVAS EAR/PLS OXIMETRY MLT: CPT

## 2018-10-28 PROCEDURE — 1200000000 HC SEMI PRIVATE

## 2018-10-28 RX ORDER — MORPHINE SULFATE 15 MG/1
15 TABLET, FILM COATED, EXTENDED RELEASE ORAL EVERY 12 HOURS SCHEDULED
Status: DISCONTINUED | OUTPATIENT
Start: 2018-10-28 | End: 2018-10-31 | Stop reason: HOSPADM

## 2018-10-28 RX ADMIN — GABAPENTIN 300 MG: 300 CAPSULE ORAL at 09:50

## 2018-10-28 RX ADMIN — AMLODIPINE BESYLATE 10 MG: 10 TABLET ORAL at 09:50

## 2018-10-28 RX ADMIN — METOPROLOL TARTRATE 25 MG: 25 TABLET ORAL at 20:44

## 2018-10-28 RX ADMIN — Medication 10 ML: at 09:54

## 2018-10-28 RX ADMIN — CARBIDOPA AND LEVODOPA 3 TABLET: 25; 100 TABLET ORAL at 20:43

## 2018-10-28 RX ADMIN — Medication 5000 UNITS: at 09:50

## 2018-10-28 RX ADMIN — OXYCODONE HYDROCHLORIDE 10 MG: 5 TABLET ORAL at 11:36

## 2018-10-28 RX ADMIN — TRAZODONE HYDROCHLORIDE 50 MG: 50 TABLET ORAL at 20:44

## 2018-10-28 RX ADMIN — MONTELUKAST SODIUM 10 MG: 10 TABLET, COATED ORAL at 20:44

## 2018-10-28 RX ADMIN — LEVOTHYROXINE SODIUM 100 MCG: 100 TABLET ORAL at 06:15

## 2018-10-28 RX ADMIN — INSULIN GLARGINE 41 UNITS: 100 INJECTION, SOLUTION SUBCUTANEOUS at 10:12

## 2018-10-28 RX ADMIN — CLOPIDOGREL BISULFATE 75 MG: 75 TABLET ORAL at 09:50

## 2018-10-28 RX ADMIN — MORPHINE SULFATE 15 MG: 15 TABLET, FILM COATED, EXTENDED RELEASE ORAL at 09:51

## 2018-10-28 RX ADMIN — INSULIN LISPRO 1 UNITS: 100 INJECTION, SOLUTION INTRAVENOUS; SUBCUTANEOUS at 21:02

## 2018-10-28 RX ADMIN — CARBIDOPA AND LEVODOPA 3 TABLET: 25; 100 TABLET ORAL at 09:50

## 2018-10-28 RX ADMIN — ASPIRIN 81 MG: 81 TABLET, COATED ORAL at 09:50

## 2018-10-28 RX ADMIN — DOCUSATE SODIUM 100 MG: 100 CAPSULE, LIQUID FILLED ORAL at 09:50

## 2018-10-28 RX ADMIN — VORTIOXETINE 10 MG: 10 TABLET, FILM COATED ORAL at 10:04

## 2018-10-28 RX ADMIN — GABAPENTIN 300 MG: 300 CAPSULE ORAL at 14:14

## 2018-10-28 RX ADMIN — ATORVASTATIN CALCIUM 40 MG: 20 TABLET, FILM COATED ORAL at 20:43

## 2018-10-28 RX ADMIN — ROPINIROLE HYDROCHLORIDE 4 MG: 2 TABLET, FILM COATED ORAL at 20:44

## 2018-10-28 RX ADMIN — QUETIAPINE FUMARATE 25 MG: 25 TABLET ORAL at 20:44

## 2018-10-28 RX ADMIN — CARBIDOPA AND LEVODOPA 3 TABLET: 25; 100 TABLET ORAL at 14:14

## 2018-10-28 RX ADMIN — INSULIN LISPRO 2 UNITS: 100 INJECTION, SOLUTION INTRAVENOUS; SUBCUTANEOUS at 10:12

## 2018-10-28 RX ADMIN — OXYCODONE HYDROCHLORIDE 10 MG: 5 TABLET ORAL at 03:27

## 2018-10-28 RX ADMIN — MORPHINE SULFATE 15 MG: 15 TABLET, FILM COATED, EXTENDED RELEASE ORAL at 20:43

## 2018-10-28 RX ADMIN — Medication 10 ML: at 20:47

## 2018-10-28 RX ADMIN — LINAGLIPTIN 5 MG: 5 TABLET, FILM COATED ORAL at 09:50

## 2018-10-28 RX ADMIN — CYCLOBENZAPRINE HYDROCHLORIDE 10 MG: 10 TABLET, FILM COATED ORAL at 09:50

## 2018-10-28 RX ADMIN — GABAPENTIN 300 MG: 300 CAPSULE ORAL at 20:44

## 2018-10-28 RX ADMIN — LURASIDONE HYDROCHLORIDE 60 MG: 40 TABLET, FILM COATED ORAL at 20:44

## 2018-10-28 RX ADMIN — METOPROLOL TARTRATE 25 MG: 25 TABLET ORAL at 09:50

## 2018-10-28 RX ADMIN — INSULIN LISPRO 1 UNITS: 100 INJECTION, SOLUTION INTRAVENOUS; SUBCUTANEOUS at 18:13

## 2018-10-28 RX ADMIN — INSULIN LISPRO 3 UNITS: 100 INJECTION, SOLUTION INTRAVENOUS; SUBCUTANEOUS at 14:14

## 2018-10-28 RX ADMIN — BENZTROPINE MESYLATE 0.5 MG: 1 TABLET ORAL at 20:49

## 2018-10-28 ASSESSMENT — PAIN DESCRIPTION - LOCATION
LOCATION: SHOULDER

## 2018-10-28 ASSESSMENT — PAIN DESCRIPTION - PAIN TYPE
TYPE: ACUTE PAIN
TYPE: SURGICAL PAIN
TYPE: SURGICAL PAIN
TYPE: ACUTE PAIN;SURGICAL PAIN

## 2018-10-28 ASSESSMENT — PAIN DESCRIPTION - ONSET
ONSET: GRADUAL
ONSET: ON-GOING

## 2018-10-28 ASSESSMENT — PAIN DESCRIPTION - ORIENTATION
ORIENTATION: RIGHT

## 2018-10-28 ASSESSMENT — PAIN DESCRIPTION - FREQUENCY
FREQUENCY: CONTINUOUS

## 2018-10-28 ASSESSMENT — PAIN DESCRIPTION - PROGRESSION
CLINICAL_PROGRESSION: GRADUALLY WORSENING
CLINICAL_PROGRESSION: NOT CHANGED
CLINICAL_PROGRESSION: GRADUALLY WORSENING
CLINICAL_PROGRESSION: NOT CHANGED

## 2018-10-28 ASSESSMENT — PAIN SCALES - GENERAL
PAINLEVEL_OUTOF10: 0
PAINLEVEL_OUTOF10: 5
PAINLEVEL_OUTOF10: 10
PAINLEVEL_OUTOF10: 0
PAINLEVEL_OUTOF10: 7
PAINLEVEL_OUTOF10: 6

## 2018-10-28 ASSESSMENT — PAIN DESCRIPTION - DESCRIPTORS
DESCRIPTORS: ACHING;CONSTANT
DESCRIPTORS: ACHING
DESCRIPTORS: ACHING
DESCRIPTORS: ACHING;CONSTANT

## 2018-10-28 NOTE — PROGRESS NOTES
medication. 2. Physician will be contacted for respiratory rate (RR) greater than 35 breaths per minute. Therapy will be held for heart rate (HR) greater than 140 beats per minute, pending direction from physician. 3. Bronchodilators will be administered via Metered Dose Inhaler (MDI) with spacer when the following criteria are met:  a. Alert and cooperative     b. HR < 140 bpm  c. RR < 30 bpm                d. Can demonstrate a 2-3 second inspiratory hold  4. Bronchodilators will be administered via Hand Held Nebulizer TELLY [facility]) to patients when ANY of the following criteria are met  a. Incognizant or uncooperative          b. Patients treated with HHN at Home        c. Unable to demonstrate proper use of MDI with spacer     d. RR > 30 bpm   5. Bronchodilators will be delivered via Metered Dose Inhaler (MDI), HHN, Aerogen to intubated patients on mechanical ventilation. 6. Inhalation medication orders will be delivered and/or substituted as outlined below. Aerosolized Medications Ordering and Administration Guidelines:    1. All Medications will be ordered by a physician, and their frequency and/or modality will be adjusted as defined by the patients Respiratory Severity Index (RSI) score. 2. If the patient does not have documented COPD, consider discontinuing anticholinergics when RSI is less than 9.  3. If the bronchospasm worsens (increased RSI), then the bronchodilator frequency can be increased to a maximum of every 4 hours. If greater than every 4 hours is required, the physician will be contacted. 4. If the bronchospasm improves, the frequency of the bronchodilator can be decreased, based on the patient's RSI, but not less than home treatment regimen frequency. 5. Bronchodilator(s) will be discontinued if patient has a RSI less than 9 and has received no scheduled or as needed treatment for 72  Hrs. Patients Ordered on a Mucolytic Agent:    1.  Must always be administered with a

## 2018-10-28 NOTE — PROGRESS NOTES
Occupational Therapy  Facility/Department: [facility] 5T ORTHO/NEURO  Daily Treatment Note  NAME: Ramos Bunch  : 1955  MRN: [id number]    Date of Service: 10/28/2018    Discharge Recommendations: Ramos Bunch scored a 18/24 on the AM-PAC ADL Inpatient form. Current research shows that an AM-PAC score of 18 or greater is typically associated with a discharge to the patient's home setting. Based on the patients AM-PAC score and their current ADL deficits, it is recommended that the patient have 2-3 sessions per week of Occupational Therapy at d/c to increase the patients independence. OT Equipment Recommendations  Equipment Needed: No    Patient Diagnosis(es): The encounter diagnosis was Closed 3-part fracture of proximal humerus, right, initial encounter. has a past medical history of Anxiety; Arthritis; CAD (coronary artery disease); Chronic back pain; COPD (chronic obstructive pulmonary disease) ([address] Depression; Diabetes mellitus ([address] Fibromyalgia; Full dentures; GERD (gastroesophageal reflux disease); Hyperlipidemia; Hypertension; IBS (irritable bowel syndrome); Kidney failure; PACO (obstructive sleep apnea); Parkinson disease ([address] Restless leg syndrome; Shoulder injury; and Thyroid disease. has a past surgical history that includes Carpal tunnel release (Left); Rotator cuff repair; Hip fracture surgery (Left); Coronary angioplasty with stent; Colonoscopy; Carpal tunnel release (Right, 2018); bronchoscopy (2017); other surgical history (N/A, 2018); and fracture surgery (Left). Restrictions  Position Activity Restriction  Other position/activity restrictions: Sling post op, ambulate patient  Subjective   General  Chart Reviewed: Yes  Additional Pertinent Hx: 61 y.o. F admitted for R shoulder proximal humerus ORIF on 10/27 per Dr. Mimi Jackson.    PMHx= anxiety, arthritis, chronic back pain, COPD, DM, fibromyalgia, Parkinsons  Family / Caregiver Present: No  Diagnosis: Right

## 2018-10-28 NOTE — PROGRESS NOTES
Hospitalist Progress Note      PCP: Maldonado Chatterjee MD    Date of Admission: 10/26/2018    Reason for consult: Hypertension, Diabetes, COPD management       Hospital Course:   admitted for right shoulder proximal humerus open reduction and internal fixation 10/26/2018. We are consulted for evaluation and management of DM, HTN and COPD.     Subjective:   She feels better  Some sob at night but no sob now  No chest pain  Apetite ok, completed her breakfast      Medications:  Reviewed    Infusion Medications    dextrose      sodium chloride 125 mL/hr at 10/26/18 2100     Scheduled Medications    morphine  15 mg Oral 2 times per day    vitamin D  5,000 Units Oral Daily    insulin lispro  0-6 Units Subcutaneous TID WC    insulin lispro  0-3 Units Subcutaneous Nightly    insulin glargine  41 Units Subcutaneous Daily    amLODIPine  10 mg Oral Daily    aspirin  81 mg Oral Daily    atorvastatin  40 mg Oral Nightly    benztropine  0.5 mg Oral Nightly    carbidopa-levodopa  3 tablet Oral TID    clopidogrel  75 mg Oral Daily    gabapentin  300 mg Oral TID    levothyroxine  100 mcg Oral Daily    linagliptin  5 mg Oral Daily    lurasidone  60 mg Oral Nightly    metoprolol tartrate  25 mg Oral BID    montelukast  10 mg Oral Nightly    QUEtiapine  25 mg Oral Nightly    Roflumilast  250 mcg Oral Daily    rOPINIRole  4 mg Oral Daily    traZODone  50 mg Oral Nightly    VORTIoxetine  10 mg Oral Daily    sodium chloride flush  10 mL Intravenous 2 times per day    docusate sodium  100 mg Oral BID     PRN Meds: albuterol, glucose, dextrose, glucagon (rDNA), dextrose, cyclobenzaprine, sodium chloride flush, acetaminophen, ondansetron, oxyCODONE **OR** oxyCODONE, morphine **OR** morphine      Intake/Output Summary (Last 24 hours) at 10/28/18 1153  Last data filed at 10/28/18 0541   Gross per 24 hour   Intake              340 ml   Output                0 ml   Net              340 ml       Physical Exam

## 2018-10-28 NOTE — CARE COORDINATION
CM following for d/c needs ,  Per MD  , may require  [facility] or SNF :  Per OT   scored a 18/24 on the AM-PAC ADL , will await , PT evals :  Patient will require  [facility] Pre [facility]  And will make appropriate referrals as requested. Electronically signed by Bud Pack RN on 10/28/2018 at 9:38 AM    CM met with pt again at bedside , she states she will have [facility]HA  M-F starting on Monday though     [address]  Address: [facility] [address], [address] [facility], [address] [place] [address]. [address]   Phone: [phone]    She uses to have just 2 days a week , but may still need to go to SNF before d/c home , and is agreeable.   believes she has been to [facility] [place] in the past and CM made referral to there and will need to follow up with  Facility  Patient and therapy in the AM .     [address] , [address] [place] [address]   Phone #:   [phone]  or [phone]  Fax:  [phone]      Electronically signed by Bud Pack RN on 10/28/2018 at 4:49 PM

## 2018-10-29 LAB
GLUCOSE BLD-MCNC: 102 MG/DL (ref 70–99)
GLUCOSE BLD-MCNC: 131 MG/DL (ref 70–99)
GLUCOSE BLD-MCNC: 160 MG/DL (ref 70–99)
GLUCOSE BLD-MCNC: 189 MG/DL (ref 70–99)
PERFORMED ON: ABNORMAL

## 2018-10-29 PROCEDURE — 6370000000 HC RX 637 (ALT 250 FOR IP): Performed by: ORTHOPAEDIC SURGERY

## 2018-10-29 PROCEDURE — 2700000000 HC OXYGEN THERAPY PER DAY

## 2018-10-29 PROCEDURE — 97116 GAIT TRAINING THERAPY: CPT

## 2018-10-29 PROCEDURE — 97530 THERAPEUTIC ACTIVITIES: CPT

## 2018-10-29 PROCEDURE — 94760 N-INVAS EAR/PLS OXIMETRY 1: CPT

## 2018-10-29 PROCEDURE — G8978 MOBILITY CURRENT STATUS: HCPCS

## 2018-10-29 PROCEDURE — 2580000003 HC RX 258: Performed by: ORTHOPAEDIC SURGERY

## 2018-10-29 PROCEDURE — 94680 O2 UPTK RST&XERS DIR SIMPLE: CPT

## 2018-10-29 PROCEDURE — G8979 MOBILITY GOAL STATUS: HCPCS

## 2018-10-29 PROCEDURE — 97162 PT EVAL MOD COMPLEX 30 MIN: CPT

## 2018-10-29 PROCEDURE — 1200000000 HC SEMI PRIVATE

## 2018-10-29 RX ADMIN — MONTELUKAST SODIUM 10 MG: 10 TABLET, COATED ORAL at 21:28

## 2018-10-29 RX ADMIN — INSULIN LISPRO 1 UNITS: 100 INJECTION, SOLUTION INTRAVENOUS; SUBCUTANEOUS at 18:13

## 2018-10-29 RX ADMIN — LINAGLIPTIN 5 MG: 5 TABLET, FILM COATED ORAL at 09:06

## 2018-10-29 RX ADMIN — ATORVASTATIN CALCIUM 40 MG: 20 TABLET, FILM COATED ORAL at 21:27

## 2018-10-29 RX ADMIN — AMLODIPINE BESYLATE 10 MG: 10 TABLET ORAL at 09:07

## 2018-10-29 RX ADMIN — ROPINIROLE HYDROCHLORIDE 4 MG: 2 TABLET, FILM COATED ORAL at 09:15

## 2018-10-29 RX ADMIN — LEVOTHYROXINE SODIUM 100 MCG: 100 TABLET ORAL at 06:11

## 2018-10-29 RX ADMIN — Medication 10 ML: at 09:17

## 2018-10-29 RX ADMIN — GABAPENTIN 300 MG: 300 CAPSULE ORAL at 21:28

## 2018-10-29 RX ADMIN — INSULIN LISPRO 1 UNITS: 100 INJECTION, SOLUTION INTRAVENOUS; SUBCUTANEOUS at 21:34

## 2018-10-29 RX ADMIN — VORTIOXETINE 10 MG: 10 TABLET, FILM COATED ORAL at 09:16

## 2018-10-29 RX ADMIN — CARBIDOPA AND LEVODOPA 3 TABLET: 25; 100 TABLET ORAL at 16:27

## 2018-10-29 RX ADMIN — OXYCODONE HYDROCHLORIDE 10 MG: 5 TABLET ORAL at 16:32

## 2018-10-29 RX ADMIN — ASPIRIN 81 MG: 81 TABLET, COATED ORAL at 09:06

## 2018-10-29 RX ADMIN — Medication 5000 UNITS: at 09:16

## 2018-10-29 RX ADMIN — METOPROLOL TARTRATE 25 MG: 25 TABLET ORAL at 21:27

## 2018-10-29 RX ADMIN — OXYCODONE HYDROCHLORIDE 10 MG: 5 TABLET ORAL at 00:49

## 2018-10-29 RX ADMIN — QUETIAPINE FUMARATE 25 MG: 25 TABLET ORAL at 21:28

## 2018-10-29 RX ADMIN — CLOPIDOGREL BISULFATE 75 MG: 75 TABLET ORAL at 09:07

## 2018-10-29 RX ADMIN — Medication 10 ML: at 21:34

## 2018-10-29 RX ADMIN — CARBIDOPA AND LEVODOPA 3 TABLET: 25; 100 TABLET ORAL at 21:27

## 2018-10-29 RX ADMIN — TRAZODONE HYDROCHLORIDE 50 MG: 50 TABLET ORAL at 21:28

## 2018-10-29 RX ADMIN — INSULIN GLARGINE 41 UNITS: 100 INJECTION, SOLUTION SUBCUTANEOUS at 09:19

## 2018-10-29 RX ADMIN — MORPHINE SULFATE 15 MG: 15 TABLET, FILM COATED, EXTENDED RELEASE ORAL at 21:28

## 2018-10-29 RX ADMIN — BENZTROPINE MESYLATE 0.5 MG: 1 TABLET ORAL at 21:28

## 2018-10-29 RX ADMIN — MORPHINE SULFATE 15 MG: 15 TABLET, FILM COATED, EXTENDED RELEASE ORAL at 09:06

## 2018-10-29 RX ADMIN — METOPROLOL TARTRATE 25 MG: 25 TABLET ORAL at 09:05

## 2018-10-29 RX ADMIN — LURASIDONE HYDROCHLORIDE 60 MG: 40 TABLET, FILM COATED ORAL at 21:27

## 2018-10-29 RX ADMIN — GABAPENTIN 300 MG: 300 CAPSULE ORAL at 16:28

## 2018-10-29 RX ADMIN — GABAPENTIN 300 MG: 300 CAPSULE ORAL at 09:05

## 2018-10-29 RX ADMIN — CARBIDOPA AND LEVODOPA 3 TABLET: 25; 100 TABLET ORAL at 09:07

## 2018-10-29 ASSESSMENT — PAIN DESCRIPTION - PROGRESSION
CLINICAL_PROGRESSION: NOT CHANGED

## 2018-10-29 ASSESSMENT — PAIN DESCRIPTION - PAIN TYPE
TYPE: SURGICAL PAIN
TYPE: ACUTE PAIN
TYPE: ACUTE PAIN

## 2018-10-29 ASSESSMENT — PAIN SCALES - GENERAL
PAINLEVEL_OUTOF10: 9
PAINLEVEL_OUTOF10: 9
PAINLEVEL_OUTOF10: 8
PAINLEVEL_OUTOF10: 5
PAINLEVEL_OUTOF10: 3
PAINLEVEL_OUTOF10: 0

## 2018-10-29 ASSESSMENT — PAIN DESCRIPTION - FREQUENCY
FREQUENCY: CONTINUOUS

## 2018-10-29 ASSESSMENT — PAIN DESCRIPTION - DESCRIPTORS
DESCRIPTORS: ACHING;CONSTANT

## 2018-10-29 ASSESSMENT — PAIN DESCRIPTION - LOCATION
LOCATION: SHOULDER

## 2018-10-29 ASSESSMENT — PAIN DESCRIPTION - ORIENTATION
ORIENTATION: RIGHT

## 2018-10-29 ASSESSMENT — PAIN DESCRIPTION - ONSET
ONSET: ON-GOING

## 2018-10-29 NOTE — PROGRESS NOTES
Hypertension     IBS (irritable bowel syndrome)     Kidney failure 1999    r/t sepsis    PACO (obstructive sleep apnea)     NO CPAP     Parkinson disease (HCC)     Restless leg syndrome     Shoulder injury 10/07/2018    RT- recent fall     Thyroid disease      Past Medical History:   Diagnosis Date    Anxiety     Arthritis     CAD (coronary artery disease)     Chronic back pain since 1990's    COPD (chronic obstructive pulmonary disease) ([address]     Depression     Diabetes mellitus (HCC)     Fibromyalgia     Full dentures     GERD (gastroesophageal reflux disease)     Hyperlipidemia     Hypertension     IBS (irritable bowel syndrome)     Kidney failure 1999    r/t sepsis    PACO (obstructive sleep apnea)     NO CPAP     Parkinson disease (HCC)     Restless leg syndrome     Shoulder injury 10/07/2018    RT- recent fall     Thyroid disease      Active Ambulatory Problems     Diagnosis Date Noted    Carpal tunnel syndrome of right wrist 03/06/2018    Trigger finger, left little finger 05/08/2018     Resolved Ambulatory Problems     Diagnosis Date Noted    No Resolved Ambulatory Problems     Past Medical History:   Diagnosis Date    Anxiety     Arthritis     CAD (coronary artery disease)     Chronic back pain since 1990's    COPD (chronic obstructive pulmonary disease) ([address]     Depression     Diabetes mellitus ([address]     Fibromyalgia     Full dentures     GERD (gastroesophageal reflux disease)     Hyperlipidemia     Hypertension     IBS (irritable bowel syndrome)     Kidney failure 1999    PACO (obstructive sleep apnea)     Parkinson disease ([address]     Restless leg syndrome     Shoulder injury 10/07/2018    Thyroid disease        Review of Systems:  Ambrose Wallace's reported review of systems has been reviewed and has been scanned into her medical record for today's visit. The scanned image can be found in media images folder.   She was instructed to contact her primary care physician regarding ROS positives if not already being addressed during today's visit. Height: 5' 1\" (154.9 cm), Weight: 145 lb (65.8 kg), Body mass index is 27.4 kg/m².,  A complete review of systems was performed and everything was negative except for what was stated above in the history. Vital Signs:  Vitals:    10/29/18 0325   BP: 104/64   Pulse: 83   Resp: 16   Temp: 98.6 °F (37 °C)   SpO2: 94%     Height: 5' 1\" (154.9 cm), Weight: 145 lb (65.8 kg), Body mass index is 27.4 kg/m². ,      Physical Exam:     Appearance: Alert, oriented x 3, in no apparent distress, and well groomed, O2 via nasal canula in place  Right shoulder exam: Dressing clean and dry, compartments are soft and compressible, sling in place, axillary sensation intact, median, ulnar, radial, AIN, and PIN are grossly intact to the motor and sensation. ROM limited due to pain and post operative status. Hand is well perfused, +radial pulse. Assessment:  S/p right proximal humerus open reduction and internal fixation, POD #3  Acute on chronic pulmonary disease  Acute on chronic pain   DM II      Treatment Plan:    -Maintain sling R UE  -Leave dressing in place  -NWB on the operative arm  -Ice PRN  -Glycemic control  -Pain control: MS Contin 15 mg BID, Oxycodone PRN - upon discharge may resume Nucynta and Oxycodone PRN  -DVT ppx: Plavix/ASA  -PT/OT increase activity as tolerated  -Home O2 needs to be set up for the patient  -Encourage diet/fluids  -Follow up as scheduled with PT and Dr. Caitlin Lay  -Appreciate Hospitalist recommendations re: DM management/Pain meds  -Call for questions and updates to patient's care: [phone]  -Disposition: case management to discuss with patient disposition: [facility] vs. Home w/ [facility]   -Patient stable for discharge today pending patient and case management conversation  -Patient is in agreement with the plan    Jessica Yi, [address] [place] [address] Sports Medicine and [address] [place] [address]

## 2018-10-29 NOTE — PLAN OF CARE
Problem: Falls - Risk of:  Goal: Will remain free from falls  Will remain free from falls   Pt free from falls. Fall precautions are in place: bed is locked and in lowest position, side rails are up x 2, bed alarm is on, nonskid socks are on, bedside table and call light are within reach. Will continue to monitor.

## 2018-10-29 NOTE — PROGRESS NOTES
& F for ~2 hours, but will increase services to M-F 3-4 hours upon discharge. Has assist from neighbors prn on weekends. Objective  Strength  Strength RLE: WFL  Strength LLE: WFL    Bed mobility  Supine to Sit: Minimal assistance (HOB elevated, increased effort to scoot self to EOB)     Transfers  Sit to Stand: Contact guard assistance (from EOB to chair, offered hand held assistance to complete transfer)  Stand to sit: Contact guard assistance (cues for safety)     Ambulation  Device: Hand-Held Assist (left UE)  Other Apparatus:  (R shoulder sling in place)  Assistance: Minimal assistance  Quality of Gait: slow gait, shuffled steps, unsteady   Distance: 30ft   Comments: SpO2 decreased to 85% on room air with activity. Pt placed back on 2L O2 and sats increased to low 90s with seated rest and pursed lip breathing. Balance  Standing - Static: Fair  Standing - Dynamic: Poor (Min A with hand held support )    Treatment included gait and transfer training with patient education     Assessment   Assessment: Pt admitted from home with R humerus fx s/p fall 10/7 and now s/p right humeral ORIF. Pt demonstrates decreased transfers and gait from functional baseline. Pt needing assist wtih all transfers and hand held assist for balance. Pt is at risk for ongoing falls and safety concerns if pt returns home alone. Pt would benefit from ongoing skilled PT at d/c to increase functional mobility back to baseline. Treatment Diagnosis: Decreased gait associated with R humerus ORIF. Decision Making: Medium Complexity  Patient Education: Role of PT and d/c recommendations. Pt verbalized understanding.    REQUIRES PT FOLLOW UP: Yes         Plan   Times per week: 5-7  Current Treatment Recommendations: Functional Mobility Training, Gait Training, Strengthening    Safety Devices  Type of devices: Left in chair, Chair alarm in place, Call light within reach, Nurse notified      AM-PAC Score  AM-PAC Inpatient Mobility Raw

## 2018-10-29 NOTE — PLAN OF CARE
Problem: Falls - Risk of:  Goal: Will remain free from falls  Will remain free from falls   Outcome: Ongoing  Patient has remained free of falls. 2/4 bed rails up, bed locked and in lowest position, call light within reach. Patient instructed on use of call light and uses appropriately. Bed alarm on. Non-skid footwear and fall band on. Will continue to monitor. Problem: Pain:  Goal: Control of acute pain  Control of acute pain   Outcome: Ongoing  Numeric pain rating scale being used. Patient repositioned for comfort. Ice applied. Patient is tolerating PO pain medicine. Will continue to assess.

## 2018-10-30 LAB
GLUCOSE BLD-MCNC: 119 MG/DL (ref 70–99)
GLUCOSE BLD-MCNC: 122 MG/DL (ref 70–99)
GLUCOSE BLD-MCNC: 156 MG/DL (ref 70–99)
GLUCOSE BLD-MCNC: 174 MG/DL (ref 70–99)
GLUCOSE BLD-MCNC: 78 MG/DL (ref 70–99)
PERFORMED ON: ABNORMAL
PERFORMED ON: NORMAL

## 2018-10-30 PROCEDURE — 6370000000 HC RX 637 (ALT 250 FOR IP): Performed by: ORTHOPAEDIC SURGERY

## 2018-10-30 PROCEDURE — 97530 THERAPEUTIC ACTIVITIES: CPT

## 2018-10-30 PROCEDURE — 2580000003 HC RX 258: Performed by: ORTHOPAEDIC SURGERY

## 2018-10-30 PROCEDURE — 2700000000 HC OXYGEN THERAPY PER DAY

## 2018-10-30 PROCEDURE — 1200000000 HC SEMI PRIVATE

## 2018-10-30 PROCEDURE — 97116 GAIT TRAINING THERAPY: CPT

## 2018-10-30 PROCEDURE — 94761 N-INVAS EAR/PLS OXIMETRY MLT: CPT

## 2018-10-30 PROCEDURE — 97535 SELF CARE MNGMENT TRAINING: CPT

## 2018-10-30 PROCEDURE — 97110 THERAPEUTIC EXERCISES: CPT

## 2018-10-30 RX ADMIN — LURASIDONE HYDROCHLORIDE 60 MG: 40 TABLET, FILM COATED ORAL at 20:11

## 2018-10-30 RX ADMIN — CLOPIDOGREL BISULFATE 75 MG: 75 TABLET ORAL at 09:57

## 2018-10-30 RX ADMIN — MORPHINE SULFATE 15 MG: 15 TABLET, FILM COATED, EXTENDED RELEASE ORAL at 09:04

## 2018-10-30 RX ADMIN — AMLODIPINE BESYLATE 10 MG: 10 TABLET ORAL at 09:58

## 2018-10-30 RX ADMIN — GABAPENTIN 300 MG: 300 CAPSULE ORAL at 14:09

## 2018-10-30 RX ADMIN — MONTELUKAST SODIUM 10 MG: 10 TABLET, COATED ORAL at 20:09

## 2018-10-30 RX ADMIN — METOPROLOL TARTRATE 25 MG: 25 TABLET ORAL at 09:07

## 2018-10-30 RX ADMIN — Medication 5000 UNITS: at 09:57

## 2018-10-30 RX ADMIN — CARBIDOPA AND LEVODOPA 3 TABLET: 25; 100 TABLET ORAL at 14:08

## 2018-10-30 RX ADMIN — MORPHINE SULFATE 15 MG: 15 TABLET, FILM COATED, EXTENDED RELEASE ORAL at 20:11

## 2018-10-30 RX ADMIN — INSULIN GLARGINE 41 UNITS: 100 INJECTION, SOLUTION SUBCUTANEOUS at 10:04

## 2018-10-30 RX ADMIN — ROPINIROLE HYDROCHLORIDE 4 MG: 2 TABLET, FILM COATED ORAL at 09:05

## 2018-10-30 RX ADMIN — Medication 10 ML: at 09:08

## 2018-10-30 RX ADMIN — GABAPENTIN 300 MG: 300 CAPSULE ORAL at 20:10

## 2018-10-30 RX ADMIN — TRAZODONE HYDROCHLORIDE 50 MG: 50 TABLET ORAL at 20:10

## 2018-10-30 RX ADMIN — LEVOTHYROXINE SODIUM 100 MCG: 100 TABLET ORAL at 06:11

## 2018-10-30 RX ADMIN — INSULIN LISPRO 1 UNITS: 100 INJECTION, SOLUTION INTRAVENOUS; SUBCUTANEOUS at 18:42

## 2018-10-30 RX ADMIN — METOPROLOL TARTRATE 25 MG: 25 TABLET ORAL at 20:10

## 2018-10-30 RX ADMIN — GABAPENTIN 300 MG: 300 CAPSULE ORAL at 09:58

## 2018-10-30 RX ADMIN — INSULIN LISPRO 1 UNITS: 100 INJECTION, SOLUTION INTRAVENOUS; SUBCUTANEOUS at 11:30

## 2018-10-30 RX ADMIN — CARBIDOPA AND LEVODOPA 3 TABLET: 25; 100 TABLET ORAL at 09:06

## 2018-10-30 RX ADMIN — VORTIOXETINE 10 MG: 10 TABLET, FILM COATED ORAL at 09:57

## 2018-10-30 RX ADMIN — LINAGLIPTIN 5 MG: 5 TABLET, FILM COATED ORAL at 10:03

## 2018-10-30 RX ADMIN — OXYCODONE HYDROCHLORIDE 10 MG: 5 TABLET ORAL at 14:07

## 2018-10-30 RX ADMIN — ATORVASTATIN CALCIUM 40 MG: 20 TABLET, FILM COATED ORAL at 20:10

## 2018-10-30 RX ADMIN — Medication 10 ML: at 20:12

## 2018-10-30 RX ADMIN — BENZTROPINE MESYLATE 0.5 MG: 1 TABLET ORAL at 20:10

## 2018-10-30 RX ADMIN — ASPIRIN 81 MG: 81 TABLET, COATED ORAL at 09:58

## 2018-10-30 RX ADMIN — QUETIAPINE FUMARATE 25 MG: 25 TABLET ORAL at 20:10

## 2018-10-30 RX ADMIN — CARBIDOPA AND LEVODOPA 3 TABLET: 25; 100 TABLET ORAL at 20:09

## 2018-10-30 ASSESSMENT — PAIN SCALES - GENERAL
PAINLEVEL_OUTOF10: 7
PAINLEVEL_OUTOF10: 6
PAINLEVEL_OUTOF10: 0
PAINLEVEL_OUTOF10: 2

## 2018-10-30 ASSESSMENT — PAIN DESCRIPTION - DESCRIPTORS
DESCRIPTORS: ACHING;CONSTANT
DESCRIPTORS: ACHING;CONSTANT

## 2018-10-30 ASSESSMENT — PAIN DESCRIPTION - LOCATION
LOCATION: SHOULDER
LOCATION: SHOULDER

## 2018-10-30 ASSESSMENT — PAIN DESCRIPTION - PROGRESSION
CLINICAL_PROGRESSION: NOT CHANGED
CLINICAL_PROGRESSION: NOT CHANGED

## 2018-10-30 ASSESSMENT — PAIN DESCRIPTION - FREQUENCY
FREQUENCY: CONTINUOUS
FREQUENCY: CONTINUOUS

## 2018-10-30 ASSESSMENT — PAIN DESCRIPTION - PAIN TYPE
TYPE: ACUTE PAIN;SURGICAL PAIN
TYPE: SURGICAL PAIN

## 2018-10-30 ASSESSMENT — PAIN DESCRIPTION - ONSET
ONSET: ON-GOING
ONSET: ON-GOING

## 2018-10-30 ASSESSMENT — PAIN DESCRIPTION - ORIENTATION
ORIENTATION: RIGHT
ORIENTATION: RIGHT

## 2018-10-30 NOTE — PLAN OF CARE
Problem: Falls - Risk of:  Goal: Will remain free from falls  Will remain free from falls   Pt is free from falls. Fall precautions are in place: bed is locked and in lowest position, side rails are up x 2, bed alarm is on, nonskid socks are on, bedside table and call light are within reach. Will continue to monitor. Problem: Pain:  Goal: Pain level will decrease  Pain level will decrease   Pt denies pain at this time, pt worked with PT and up to chair, tolerated very well and declines pain medication at this time. Will continue to monitor.

## 2018-10-30 NOTE — PROGRESS NOTES
General appearance:  No apparent distress, appears stated age and cooperative. HEENT:  Normal cephalic, atraumatic without obvious deformity. Pupils equal, round, and reactive to light. Extra ocular muscles intact. Conjunctivae/corneas clear. Neck: Supple, with full range of motion. No jugular venous distention. Trachea midline. Respiratory:  Normal respiratory effort. Clear to auscultation, bilaterally without Rales/Wheezes/Rhonchi. Cardiovascular:  Regular rate and rhythm with normal S1/S2 without murmurs, rubs or gallops. Abdomen: Soft, non-tender, non-distended with normal bowel sounds. Musculoskeletal:  No clubbing, cyanosis or edema bilaterally. Full range of motion without deformity. Right shoulder in sling  Skin: Skin color, texture, turgor normal.  No rashes or lesions. Neurologic:  Neurovascularly intact without any focal sensory/motor deficits. Cranial nerves: II-XII intact, grossly non-focal.  Psychiatric:  Alert and oriented, thought content appropriate, normal insight  Capillary Refill: Brisk,< 3 seconds   Peripheral Pulses: +2 palpable, equal bilaterally       Labs:   No results for input(s): WBC, HGB, HCT, PLT in the last 72 hours. No results for input(s): NA, K, CL, CO2, BUN, CREATININE, CALCIUM, PHOS in the last 72 hours. Invalid input(s): MAGNES  No results for input(s): AST, ALT, BILIDIR, BILITOT, ALKPHOS in the last 72 hours. No results for input(s): INR in the last 72 hours. No results for input(s): Glorious Bunde in the last 72 hours. Urinalysis:      Lab Results   Component Value Date    NITRU Negative 10/24/2018    WBCUA 0-2 10/24/2018    BACTERIA Rare 10/24/2018    RBCUA None seen 10/24/2018    BLOODU Negative 10/24/2018    SPECGRAV 1.015 10/24/2018    GLUCOSEU Negative 10/24/2018       Radiology:  XR HUMERUS RIGHT (MIN 2 VIEWS)   Final Result      Intraoperative fluoroscopy. Refer to operative note for details.       FLUORO FOR SURGICAL PROCEDURES   Final Result

## 2018-10-31 VITALS
DIASTOLIC BLOOD PRESSURE: 75 MMHG | HEART RATE: 82 BPM | SYSTOLIC BLOOD PRESSURE: 118 MMHG | BODY MASS INDEX: 27.38 KG/M2 | WEIGHT: 145 LBS | TEMPERATURE: 98.2 F | OXYGEN SATURATION: 90 % | HEIGHT: 61 IN | RESPIRATION RATE: 18 BRPM

## 2018-10-31 LAB
GLUCOSE BLD-MCNC: 190 MG/DL (ref 70–99)
GLUCOSE BLD-MCNC: 96 MG/DL (ref 70–99)
PERFORMED ON: ABNORMAL
PERFORMED ON: NORMAL

## 2018-10-31 PROCEDURE — 97116 GAIT TRAINING THERAPY: CPT

## 2018-10-31 PROCEDURE — 97530 THERAPEUTIC ACTIVITIES: CPT

## 2018-10-31 PROCEDURE — 6370000000 HC RX 637 (ALT 250 FOR IP): Performed by: ORTHOPAEDIC SURGERY

## 2018-10-31 PROCEDURE — 97535 SELF CARE MNGMENT TRAINING: CPT

## 2018-10-31 RX ADMIN — ASPIRIN 81 MG: 81 TABLET, COATED ORAL at 09:15

## 2018-10-31 RX ADMIN — OXYCODONE HYDROCHLORIDE 5 MG: 5 TABLET ORAL at 16:24

## 2018-10-31 RX ADMIN — CARBIDOPA AND LEVODOPA 3 TABLET: 25; 100 TABLET ORAL at 13:48

## 2018-10-31 RX ADMIN — LEVOTHYROXINE SODIUM 100 MCG: 100 TABLET ORAL at 06:30

## 2018-10-31 RX ADMIN — CLOPIDOGREL BISULFATE 75 MG: 75 TABLET ORAL at 09:16

## 2018-10-31 RX ADMIN — Medication 5000 UNITS: at 09:14

## 2018-10-31 RX ADMIN — INSULIN GLARGINE 41 UNITS: 100 INJECTION, SOLUTION SUBCUTANEOUS at 09:04

## 2018-10-31 RX ADMIN — METOPROLOL TARTRATE 25 MG: 25 TABLET ORAL at 09:16

## 2018-10-31 RX ADMIN — MORPHINE SULFATE 15 MG: 15 TABLET, FILM COATED, EXTENDED RELEASE ORAL at 09:16

## 2018-10-31 RX ADMIN — GABAPENTIN 300 MG: 300 CAPSULE ORAL at 13:48

## 2018-10-31 RX ADMIN — VORTIOXETINE 10 MG: 10 TABLET, FILM COATED ORAL at 13:49

## 2018-10-31 RX ADMIN — INSULIN LISPRO 1 UNITS: 100 INJECTION, SOLUTION INTRAVENOUS; SUBCUTANEOUS at 13:44

## 2018-10-31 RX ADMIN — CARBIDOPA AND LEVODOPA 3 TABLET: 25; 100 TABLET ORAL at 09:16

## 2018-10-31 RX ADMIN — OXYCODONE HYDROCHLORIDE 5 MG: 5 TABLET ORAL at 09:14

## 2018-10-31 RX ADMIN — LINAGLIPTIN 5 MG: 5 TABLET, FILM COATED ORAL at 09:16

## 2018-10-31 RX ADMIN — ROPINIROLE HYDROCHLORIDE 4 MG: 2 TABLET, FILM COATED ORAL at 09:15

## 2018-10-31 RX ADMIN — GABAPENTIN 300 MG: 300 CAPSULE ORAL at 09:16

## 2018-10-31 ASSESSMENT — PAIN SCALES - GENERAL
PAINLEVEL_OUTOF10: 6
PAINLEVEL_OUTOF10: 6

## 2018-10-31 NOTE — PROGRESS NOTES
Physical Therapy  Facility/Department: [facility] 5T ORTHO/NEURO  Daily Treatment Note  NAME: Agusto Rose  : 1955  MRN: [id number]    Date of Service: 10/31/2018    Discharge Recommendations:    Agusto Rose scored a 17/24 on the AM-PAC short mobility form. Current research shows that an AM-PAC score of 17 or less is typically not associated with a discharge to the patient's home setting. Based on the patients AM-PAC score and their current functional mobility deficits, it is recommended that the patient have 3-5 sessions per week of Physical Therapy at d/c to increase the patients independence. PT Equipment Recommendations  Equipment Needed: No    Patient Diagnosis(es): The encounter diagnosis was Closed 3-part fracture of proximal humerus, right, initial encounter. has a past medical history of Anxiety; Arthritis; CAD (coronary artery disease); Chronic back pain; COPD (chronic obstructive pulmonary disease) ([address] Depression; Diabetes mellitus ([address] Fibromyalgia; Full dentures; GERD (gastroesophageal reflux disease); Hyperlipidemia; Hypertension; IBS (irritable bowel syndrome); Kidney failure; PACO (obstructive sleep apnea); Parkinson disease ([address] Restless leg syndrome; Shoulder injury; and Thyroid disease. has a past surgical history that includes Carpal tunnel release (Left); Rotator cuff repair; Hip fracture surgery (Left); Coronary angioplasty with stent; Colonoscopy; Carpal tunnel release (Right, 2018); bronchoscopy (2017); other surgical history (N/A, 2018); fracture surgery (Left); and open treatment prox humeral fracture (Right, 10/26/2018). Restrictions  Position Activity Restriction  Other position/activity restrictions: Sling post op, ambulate patient  Subjective   General  Chart Reviewed: Yes  Additional Pertinent Hx: Pt admitted 10/27 for R shoulder proximal humerus ORIF on 10/27 per Dr. Juan Francisco Stuart.   PMH:  anxiety, OA, CAD, back pain, COPD, depression, DM,

## 2018-10-31 NOTE — CARE COORDINATION
Received a call from Mesfin Fisher at [facility] letting me know they got precert. Arranged for transport to the facility via [address] ambulance for 1630 today. Dartha Goldberg RN is aware and can call report to [phone] and will fax all orders to [phone]. HENS submitted.      Electronically signed by Jayy Menjivar RN on 10/31/2018 at 3:23 PM

## 2018-10-31 NOTE — PROGRESS NOTES
Occupational Therapy  Facility/Department: [facility] 5T ORTHO/NEURO  Daily Treatment Note  Possible Discharge  NAME: Agusto Rose  : 1955  MRN: [id number]    Date of Service: 10/31/2018    Discharge Recommendations:  Agusto Rose scored a 17/24 on the AM-PAC ADL Inpatient form. Current research shows that an AM-PAC score of 17 or less is typically not associated with a discharge to the patient's home setting. Based on the patients AM-PAC score and their current ADL deficits, it is recommended that the patient have 3-5 sessions per week of Occupational Therapy at d/c to increase the patients independence. OT Equipment Recommendations  Other: defer recommendations to discharge facility    Patient Diagnosis(es): The encounter diagnosis was Closed 3-part fracture of proximal humerus, right, initial encounter. has a past medical history of Anxiety; Arthritis; CAD (coronary artery disease); Chronic back pain; COPD (chronic obstructive pulmonary disease) ([address] Depression; Diabetes mellitus ([address] Fibromyalgia; Full dentures; GERD (gastroesophageal reflux disease); Hyperlipidemia; Hypertension; IBS (irritable bowel syndrome); Kidney failure; PACO (obstructive sleep apnea); Parkinson disease ([address] Restless leg syndrome; Shoulder injury; and Thyroid disease. has a past surgical history that includes Carpal tunnel release (Left); Rotator cuff repair; Hip fracture surgery (Left); Coronary angioplasty with stent; Colonoscopy; Carpal tunnel release (Right, 2018); bronchoscopy (2017); other surgical history (N/A, 2018); fracture surgery (Left); and open treatment prox humeral fracture (Right, 10/26/2018). Restrictions  Position Activity Restriction  Other position/activity restrictions: Sling post op, ambulate patient  Subjective   General  Chart Reviewed: Yes  Additional Pertinent Hx: 61 y.o. F admitted for R shoulder proximal humerus ORIF on 10/27 per Dr. Juan Francisco Stuart.    PMHx= anxiety, arthritis, treatment; O2 sats after activity 91%   Safety Devices  Safety Devices in place: Yes  Type of devices: Left in chair;Call light within reach; Chair alarm in place;Nurse notified          Plan  This note will serve as a discharge summary in the event the patient is discharged prior to next treatment session. Plan  Times per week: 7x  Times per day: Daily  Current Treatment Recommendations: Functional Mobility Training, Endurance Training, Self-Care / ADL                                             AM-PAC Score        AM-[facility] Inpatient Daily Activity Raw Score: 17  AM-PAC Inpatient ADL T-Scale Score : 37.26  ADL Inpatient CMS 0-100% Score: 50.11  ADL Inpatient CMS G-Code Modifier : CK    Goals  Short term goals  Time Frame for Short term goals: By d/c  Short term goal 1: Pt will don front buttoning shirt and sling w/ min A - goal not met   Short term goal 2: Pt will don/doff sling with min assist (not met)  Short term goal 3: Pt will transsfer to/from toilet with supervision (not met)  Patient Goals   Patient goals :  To return home today        Therapy Time   Individual Concurrent Group Co-treatment   Time In 1500         Time Out 1525         Minutes 25           Timed Code Treatment Minutes: 25  Total Treatment Minutes:  [address] OTR/L [address][id number]

## 2018-10-31 NOTE — PROGRESS NOTES
[place] [facility] [place] sent with pt. Condition good and goals met at D/C.

## 2018-10-31 NOTE — DISCHARGE SUMMARY
surgery Xrays - good alignment and position of hardware and fracture. Disposition:  SNF    Meds:  Resume home medications    Condition:  Stable    Patient Instructions: Activity:  Keep upper extremity in a sling elevated. No lifting/pushing/pulling/carrying in the affected arm. Rehab exercises as per PT. Take pain killers as prescribed when needed for pain. Take Aspirin 325mg twice daily for 14 days post-op. Dressing:  Keep dressing dry for 4 days then take off outer dressing and leave open to air. Follow-up  Follow up in office as instructed by calling [phone] to confirm the appointment. ?  Follow-up with Dr. Obed Chisholm as previously scheduled. If follow-up date has been forgotten, please call his office at your [address]    Clinical Fellow, [address] [place] [address] [place]  Date:    11/4/2018

## 2018-11-12 ENCOUNTER — OFFICE VISIT (OUTPATIENT)
Dept: ORTHOPEDIC SURGERY | Age: 63
End: 2018-11-12

## 2018-11-12 VITALS
BODY MASS INDEX: 26.24 KG/M2 | DIASTOLIC BLOOD PRESSURE: 66 MMHG | HEIGHT: 61 IN | SYSTOLIC BLOOD PRESSURE: 160 MMHG | HEART RATE: 82 BPM | WEIGHT: 139 LBS

## 2018-11-12 DIAGNOSIS — Z98.890 S/P ORIF (OPEN REDUCTION INTERNAL FIXATION) FRACTURE: ICD-10-CM

## 2018-11-12 DIAGNOSIS — Z98.890 S/P SHOULDER SURGERY: ICD-10-CM

## 2018-11-12 DIAGNOSIS — M25.511 RIGHT SHOULDER PAIN, UNSPECIFIED CHRONICITY: Primary | ICD-10-CM

## 2018-11-12 DIAGNOSIS — Z87.81 S/P ORIF (OPEN REDUCTION INTERNAL FIXATION) FRACTURE: ICD-10-CM

## 2018-11-12 PROCEDURE — 99024 POSTOP FOLLOW-UP VISIT: CPT | Performed by: PHYSICIAN ASSISTANT

## 2018-11-12 NOTE — PROGRESS NOTES
History of Present Illness:  Agusto Rose is a 61 y.o. female who presents for a post operative visit. The patient underwent a right shoulder ORIF of unstable comminuted fracture of the humerus with open biceps tenodesis on 10/26/2018 by Dr. Janee Gan. Overall she is doing okay and feels that their pain is well controlled with current pain medications. She is getting her pain medications from her pain management physician. She has been compliant with wearing the UltraSling brace at all times. The patient deny fevers, chills, numbness, tingling, and shortness of breath. Medical History:  Patient's medications, allergies, past medical, surgical, social and family histories were reviewed and updated as appropriate. Review of Systems  A 14 point review of systems was completed by the patient and is available in the media section of the scanned medical record and was reviewed on 11/20/2018. Vital Signs:  Vitals:    11/12/18 1520   BP: (!) 160/66   Pulse: 82       General/Appearance: Alert and oriented and in no apparent distress. Skin:  There are no skin lesions, cellulitis, or extreme edema. The patient has warm and well-perfused Bilateral upper extremities with brisk capillary refill.      right Shoulder Exam:    Inspection: right shoulder incision that is clean, dry and intact and well approximated. The Prineo dressing is still in place. Mild ecchymosis and swelling are present as can be expected. There is no erythema, drainage or other signs of infection    Palpation:  No crepitus to gentle motion    Active Range of Motion: Deferred    Passive Range of Motion: Able to tolerate pendulum motion. Strength:  Deferred    Special Tests:  Deferred.     Neurovascular: Sensation to light touch is intact, no motor deficits, palpable radial pulses 2+    Radiology:     Plain radiographs of the right shoulder comprising 3 views: AP and in no  The lateral were obtained and reviewed in the office:

## 2018-11-28 ENCOUNTER — OFFICE VISIT (OUTPATIENT)
Dept: ORTHOPEDIC SURGERY | Age: 63
End: 2018-11-28

## 2018-11-28 VITALS
BODY MASS INDEX: 26.24 KG/M2 | SYSTOLIC BLOOD PRESSURE: 139 MMHG | HEIGHT: 61 IN | HEART RATE: 79 BPM | DIASTOLIC BLOOD PRESSURE: 73 MMHG | WEIGHT: 139 LBS

## 2018-11-28 DIAGNOSIS — Z98.890 S/P ORIF (OPEN REDUCTION INTERNAL FIXATION) FRACTURE: Primary | ICD-10-CM

## 2018-11-28 DIAGNOSIS — Z87.81 S/P ORIF (OPEN REDUCTION INTERNAL FIXATION) FRACTURE: Primary | ICD-10-CM

## 2018-11-28 PROCEDURE — 99024 POSTOP FOLLOW-UP VISIT: CPT | Performed by: ORTHOPAEDIC SURGERY

## 2018-11-28 NOTE — PROGRESS NOTES
Review of Systems   Musculoskeletal:        Right shoulder pain    All other systems reviewed and are negative.

## 2018-12-19 ENCOUNTER — OFFICE VISIT (OUTPATIENT)
Dept: ORTHOPEDIC SURGERY | Age: 63
End: 2018-12-19

## 2018-12-19 VITALS
SYSTOLIC BLOOD PRESSURE: 150 MMHG | HEIGHT: 61 IN | HEART RATE: 84 BPM | BODY MASS INDEX: 24.55 KG/M2 | WEIGHT: 130 LBS | DIASTOLIC BLOOD PRESSURE: 83 MMHG

## 2018-12-19 DIAGNOSIS — M25.511 RIGHT SHOULDER PAIN, UNSPECIFIED CHRONICITY: Primary | ICD-10-CM

## 2018-12-19 DIAGNOSIS — Z98.890 HISTORY OF OPEN REDUCTION AND INTERNAL FIXATION (ORIF) PROCEDURE: ICD-10-CM

## 2018-12-19 PROCEDURE — 99024 POSTOP FOLLOW-UP VISIT: CPT | Performed by: ORTHOPAEDIC SURGERY

## 2018-12-26 NOTE — H&P
[facility]IST[facility] AND [facility]ICAL      I am seeing Sabrina Klein at the request of Dr Koko Jenkins for evaluation of the patient's medical problems prior to insertion of intralaminar/spinous process stabilization device. 8/17/2018 8:31 AM    Patient Information:  Sabrina Klein is a 61 y.o. female [id number]  PCP:  Referring Not In System (Inactive) (Tel: None )    Chief complaint:  Back pain      History of Present Illness:  Robert Minor is a 61 y.o. female who presented with back pain. Symptom onset was gradual for a time period of 15 year(s). The severity is described as severe. The course of her symptoms over time is chronic. The symptoms improved with pain medicine and heat and worsened with movement/ambulating. The patient's symptom is associated with difficulty ambulating. She denies fevers, recent illness, cp. She does have some exertional SOB. She has copd but does not wear oxygen. She had been hospitalized and required intubation for her copd 5 years ago. She stopped smoking at that time. She has diabetes which she states is controlled. Her sugars run in the low 100s. She denies cad. REVIEW OF SYSTEMS:   Constitutional:  Negative for fever,chills or night sweats  ENT:  Negative for rhinorrhea, epistaxis, hoarseness, sore throat. Cardiovascular:   Negative for  chest pain, palpitations   Gastrointestinal:  Negative for nausea, vomiting, diarrhea  Genitourinary:  Negative for polyuria, dysuria   Hematologic/Lymphatic:  Negative for  bleeding tendency, easy bruising  Musculoskeletal:  Back pain  Neurologic:  Negative for  confusion,dysarthria. Skin:  Negative for itching,rash  Psychiatric:  Negative for depression,anxiety, agitation. Endocrine:  Negative for polydipsia,polyuria,heat /cold intolerance. Past Medical History:   has a past medical history of Anxiety;  Arthritis; CAD (coronary artery disease); COPD (chronic obstructive pulmonary disease) ([facility] Depression; Diabetes mellitus ([facility] GERD (gastroesophageal reflux disease); Hyperlipidemia; Hypertension; Kidney failure; PACO (obstructive sleep apnea); Parkinson disease ([facility] Restless leg syndrome; and Thyroid disease. Past Surgical History:   has a past surgical history that includes Carpal tunnel release (Left); Rotator cuff repair; fracture surgery (Left); Hip fracture surgery (Left); Coronary angioplasty with stent; Colonoscopy; Carpal tunnel release (Right, 04/04/2018); and bronchoscopy (06/2017). Medications:  Current Outpatient Prescriptions on File Prior to Encounter   Medication Sig Dispense Refill    gabapentin (NEURONTIN) 300 MG capsule Take 300 mg by mouth 3 times daily. Ely Bartholomew pantoprazole (PROTONIX) 40 MG tablet Take 40 mg by mouth daily      cyclobenzaprine (FLEXERIL) 10 MG tablet Take 10 mg by mouth 3 times daily as needed      tapentadol (NUCYNTA) 75 MG TABS Take 75 mg by mouth every 8 hours. Horaciocharlene Bartholomew benztropine (COGENTIN) 1 MG tablet Take 1 mg by mouth daily      Roflumilast (DALIRESP) 250 MCG TABS Take 1 tablet by mouth daily      insulin glargine (LANTUS) 100 UNIT/ML injection vial Inject 30 Units into the skin daily AM      insulin aspart (NOVOLOG) 100 UNIT/ML injection vial Inject 8 Units into the skin 3 times daily (before meals) PLUS SLIDING SCALE      amLODIPine (NORVASC) 5 MG tablet Take 10 mg by mouth daily       atorvastatin (LIPITOR) 10 MG tablet Take 40 mg by mouth daily       carbidopa-levodopa (SINEMET)  MG per tablet Take 3 tablets by mouth 3 times daily       levothyroxine (SYNTHROID) 75 MCG tablet Take 100 mcg by mouth Daily       lurasidone (LATUDA) 60 MG TABS tablet Take 60 mg by mouth nightly       metoprolol tartrate (LOPRESSOR) 25 MG tablet Take 25 mg by mouth 2 times daily       montelukast (SINGULAIR) 10 MG tablet Take 10 mg by mouth nightly       rOPINIRole (REQUIP) 4 MG tablet TAKE 1 TABLET EVERY DAY      VORTIoxetine (TRINTELLIX) 10 MG TABS tablet Take 10 mg by mouth daily       linagliptin (TRADJENTA) 5 MG tablet Take 5 mg by mouth daily       traZODone (DESYREL) 50 MG tablet Take 50 mg by mouth nightly      tiotropium (SPIRIVA) 18 MCG inhalation capsule Inhale 18 mcg into the lungs daily      aspirin (ASPIR-LOW) 81 MG EC tablet Take 81 mg by mouth daily       clopidogrel (PLAVIX) 75 MG tablet Take 75 mg by mouth       No current facility-administered medications on file prior to encounter. Allergies: Allergies   Allergen Reactions    Ipratropium-Albuterol Other (See Comments)     Nose bleed    Ipratropium-Albuterol      Nose bleeds    Metformin Other (See Comments)     Effects kidneys    Primidone Other (See Comments)     Other reaction(s): Other (See Comments)  Loss of muscle control  COULDN'T USE HER MUSCLES    Quinidine Hives    Sulfa Antibiotics Rash        Social History:   reports that she quit smoking about 4 years ago. She quit after 40.00 years of use. She has never used smokeless tobacco. She reports that she drinks alcohol. She reports that she does not use drugs. Family History:  family history includes Diabetes in her father and mother; High Blood Pressure in her father and mother; Stroke in her mother. Physical Exam:  /79   Pulse 73   Temp 98 °F (36.7 °C) (Temporal)   Resp 18   Ht 5' 1\" (1.549 m)   Wt 138 lb 9 oz (62.9 kg)   SpO2 93%   BMI 26.18 kg/m²     General appearance:  Appears comfortable. Well nourished  Eyes: Sclera clear, pupils equal  ENT: Moist mucus membranes, no thrush. Trachea midline. Cardiovascular: Regular rhythm, normal S1, S2. No murmur, gallop, rub.  No edema in lower extremities  Respiratory: Clear to auscultation bilaterally, no wheeze, good inspiratory effort  Gastrointestinal: Abdomen soft, non-tender, not distended, normal bowel sounds  Musculoskeletal: No cyanosis in digits, neck supple  Neurology: Cranial nerves grossly intact. Alert and oriented in time, place and person. No speech or motor deficits  Psychiatry: Appropriate affect. Not agitated  Skin: Warm, dry, normal turgor, no rash    Labs:  CBC:   Lab Results   Component Value Date    WBC 9.4 01/13/2010    RBC 4.80 01/13/2010    HGB 14.2 01/13/2010    HCT 42.4 01/13/2010    MCV 88.4 01/13/2010    MCH 29.5 01/13/2010    MCHC 33.4 01/13/2010    RDW 16.8 01/13/2010     01/13/2010    MPV 8.3 01/13/2010     BMP:    Lab Results   Component Value Date     01/13/2010    K 4.7 01/13/2010     01/13/2010    CO2 33 01/13/2010    BUN 21 01/13/2010    CREATININE 1.0 01/13/2010    CALCIUM 10.3 01/13/2010    GFRAA >60 01/13/2010    GLUCOSE 76 01/13/2010         Problem List  Active Problems:    * No active hospital problems. *  Resolved Problems:    * No resolved hospital problems. *        Assessment & Recommendation:     1. Spinal stenosis-patient with increased risk but medically optimized for surgery. 2. COPD-stable, no exacerbation. Increased risk for respiratory complications. 3. DM 2-controlled. Continue home meds post op. 4. Hypertension-stable. Thank you for the opportunity to participate in the care of your patient.   Yesenia Esquivel PA-C    8/17/2018 8:31 AM LR

## 2019-01-04 ENCOUNTER — TELEPHONE (OUTPATIENT)
Dept: ORTHOPEDIC SURGERY | Age: 64
End: 2019-01-04

## 2019-01-07 ENCOUNTER — TELEPHONE (OUTPATIENT)
Dept: ORTHOPEDIC SURGERY | Age: 64
End: 2019-01-07

## 2019-01-07 RX ORDER — SODIUM CHLORIDE, SODIUM LACTATE, POTASSIUM CHLORIDE, CALCIUM CHLORIDE 600; 310; 30; 20 MG/100ML; MG/100ML; MG/100ML; MG/100ML
INJECTION, SOLUTION INTRAVENOUS CONTINUOUS
Status: CANCELLED | OUTPATIENT
Start: 2019-01-07

## 2019-01-08 ENCOUNTER — HOSPITAL ENCOUNTER (OUTPATIENT)
Dept: PREADMISSION TESTING | Age: 64
Discharge: HOME | End: 2019-01-08
Payer: COMMERCIAL

## 2019-01-08 ENCOUNTER — TELEPHONE (OUTPATIENT)
Dept: ORTHOPEDIC SURGERY | Age: 64
End: 2019-01-08

## 2019-01-09 ENCOUNTER — TELEPHONE (OUTPATIENT)
Dept: ORTHOPEDIC SURGERY | Age: 64
End: 2019-01-09

## 2019-01-10 ENCOUNTER — TELEPHONE (OUTPATIENT)
Dept: ORTHOPEDIC SURGERY | Age: 64
End: 2019-01-10

## 2019-02-04 RX ORDER — SODIUM CHLORIDE, SODIUM LACTATE, POTASSIUM CHLORIDE, CALCIUM CHLORIDE 600; 310; 30; 20 MG/100ML; MG/100ML; MG/100ML; MG/100ML
INJECTION, SOLUTION INTRAVENOUS CONTINUOUS
Status: CANCELLED | OUTPATIENT
Start: 2019-02-04

## 2019-02-05 ENCOUNTER — HOSPITAL ENCOUNTER (OUTPATIENT)
Dept: PREADMISSION TESTING | Age: 64
Discharge: HOME | End: 2019-02-09
Payer: COMMERCIAL

## 2019-02-05 VITALS
SYSTOLIC BLOOD PRESSURE: 160 MMHG | DIASTOLIC BLOOD PRESSURE: 76 MMHG | HEIGHT: 61 IN | OXYGEN SATURATION: 96 % | BODY MASS INDEX: 25.49 KG/M2 | HEART RATE: 88 BPM | RESPIRATION RATE: 14 BRPM | WEIGHT: 135 LBS | TEMPERATURE: 98.3 F

## 2019-02-05 LAB
ABO/RH: NORMAL
ANION GAP SERPL CALCULATED.3IONS-SCNC: 13 MMOL/L (ref 3–16)
ANTIBODY SCREEN: NORMAL
APTT: 34.6 SEC (ref 26–36)
BASOPHILS ABSOLUTE: 0 K/UL (ref 0–0.2)
BASOPHILS RELATIVE PERCENT: 0.3 %
BILIRUBIN URINE: NEGATIVE
BLOOD, URINE: NEGATIVE
BUN BLDV-MCNC: 16 MG/DL (ref 7–20)
CALCIUM SERPL-MCNC: 8.8 MG/DL (ref 8.3–10.6)
CHLORIDE BLD-SCNC: 100 MMOL/L (ref 99–110)
CLARITY: CLEAR
CO2: 27 MMOL/L (ref 21–32)
COLOR: YELLOW
CREAT SERPL-MCNC: 0.8 MG/DL (ref 0.6–1.2)
EOSINOPHILS ABSOLUTE: 0.2 K/UL (ref 0–0.6)
EOSINOPHILS RELATIVE PERCENT: 3.3 %
EPITHELIAL CELLS, UA: NORMAL /HPF
ESTIMATED AVERAGE GLUCOSE: 277.6 MG/DL
GFR AFRICAN AMERICAN: >60
GFR NON-AFRICAN AMERICAN: >60
GLUCOSE BLD-MCNC: 109 MG/DL (ref 70–99)
GLUCOSE URINE: NEGATIVE MG/DL
HBA1C MFR BLD: 11.3 %
HCT VFR BLD CALC: 36.7 % (ref 36–48)
HEMOGLOBIN: 11.7 G/DL (ref 12–16)
INR BLD: 0.96 (ref 0.86–1.14)
KETONES, URINE: NEGATIVE MG/DL
LEUKOCYTE ESTERASE, URINE: NEGATIVE
LYMPHOCYTES ABSOLUTE: 0.9 K/UL (ref 1–5.1)
LYMPHOCYTES RELATIVE PERCENT: 17.9 %
MCH RBC QN AUTO: 25.9 PG (ref 26–34)
MCHC RBC AUTO-ENTMCNC: 31.8 G/DL (ref 31–36)
MCV RBC AUTO: 81.5 FL (ref 80–100)
MICROSCOPIC EXAMINATION: YES
MONOCYTES ABSOLUTE: 0.6 K/UL (ref 0–1.3)
MONOCYTES RELATIVE PERCENT: 10.6 %
MRSA SCREEN RT-PCR: NORMAL
NEUTROPHILS ABSOLUTE: 3.6 K/UL (ref 1.7–7.7)
NEUTROPHILS RELATIVE PERCENT: 67.9 %
NITRITE, URINE: NEGATIVE
PDW BLD-RTO: 17.8 % (ref 12.4–15.4)
PH UA: 5.5
PLATELET # BLD: 237 K/UL (ref 135–450)
PMV BLD AUTO: 7.7 FL (ref 5–10.5)
POTASSIUM SERPL-SCNC: 3.5 MMOL/L (ref 3.5–5.1)
PROTEIN UA: 30 MG/DL
PROTHROMBIN TIME: 10.9 SEC (ref 9.8–13)
RBC # BLD: 4.51 M/UL (ref 4–5.2)
RBC UA: NORMAL /HPF (ref 0–2)
SODIUM BLD-SCNC: 140 MMOL/L (ref 136–145)
SPECIFIC GRAVITY UA: >=1.03
URINE TYPE: ABNORMAL
UROBILINOGEN, URINE: 0.2 E.U./DL
WBC # BLD: 5.3 K/UL (ref 4–11)
WBC UA: NORMAL /HPF (ref 0–5)

## 2019-02-05 PROCEDURE — 85025 COMPLETE CBC W/AUTO DIFF WBC: CPT

## 2019-02-05 PROCEDURE — 85610 PROTHROMBIN TIME: CPT

## 2019-02-05 PROCEDURE — 81001 URINALYSIS AUTO W/SCOPE: CPT

## 2019-02-05 PROCEDURE — 86900 BLOOD TYPING SEROLOGIC ABO: CPT

## 2019-02-05 PROCEDURE — 86850 RBC ANTIBODY SCREEN: CPT

## 2019-02-05 PROCEDURE — 83036 HEMOGLOBIN GLYCOSYLATED A1C: CPT

## 2019-02-05 PROCEDURE — 85730 THROMBOPLASTIN TIME PARTIAL: CPT

## 2019-02-05 PROCEDURE — 80048 BASIC METABOLIC PNL TOTAL CA: CPT

## 2019-02-05 PROCEDURE — 87641 MR-STAPH DNA AMP PROBE: CPT

## 2019-02-05 PROCEDURE — 86901 BLOOD TYPING SEROLOGIC RH(D): CPT

## 2019-02-05 PROCEDURE — 87086 URINE CULTURE/COLONY COUNT: CPT

## 2019-02-05 RX ORDER — ALBUTEROL SULFATE 2.5 MG/3ML
2.5 SOLUTION RESPIRATORY (INHALATION) DAILY
COMMUNITY

## 2019-02-05 RX ORDER — LEVOTHYROXINE SODIUM 0.07 MG/1
75 TABLET ORAL DAILY
Status: ON HOLD | COMMUNITY
End: 2019-03-02 | Stop reason: CLARIF

## 2019-02-05 ASSESSMENT — PAIN DESCRIPTION - LOCATION: LOCATION: SHOULDER

## 2019-02-05 ASSESSMENT — PAIN DESCRIPTION - DESCRIPTORS: DESCRIPTORS: CONSTANT

## 2019-02-05 ASSESSMENT — PAIN DESCRIPTION - PAIN TYPE: TYPE: CHRONIC PAIN

## 2019-02-05 ASSESSMENT — PAIN DESCRIPTION - ORIENTATION: ORIENTATION: RIGHT

## 2019-02-05 ASSESSMENT — PAIN SCALES - GENERAL: PAINLEVEL_OUTOF10: 9

## 2019-02-05 ASSESSMENT — PAIN DESCRIPTION - FREQUENCY: FREQUENCY: CONTINUOUS

## 2019-02-06 LAB — URINE CULTURE, ROUTINE: NORMAL

## 2019-02-21 ENCOUNTER — TELEPHONE (OUTPATIENT)
Dept: ORTHOPEDIC SURGERY | Age: 64
End: 2019-02-21

## 2019-02-22 ENCOUNTER — TELEPHONE (OUTPATIENT)
Dept: ORTHOPEDIC SURGERY | Age: 64
End: 2019-02-22

## 2019-02-27 ENCOUNTER — OFFICE VISIT (OUTPATIENT)
Dept: ORTHOPEDIC SURGERY | Age: 64
End: 2019-02-27
Payer: COMMERCIAL

## 2019-02-27 VITALS
HEIGHT: 61 IN | BODY MASS INDEX: 25.49 KG/M2 | HEART RATE: 82 BPM | SYSTOLIC BLOOD PRESSURE: 154 MMHG | WEIGHT: 135 LBS | DIASTOLIC BLOOD PRESSURE: 79 MMHG

## 2019-02-27 DIAGNOSIS — Z98.890 S/P ORIF (OPEN REDUCTION INTERNAL FIXATION) FRACTURE: ICD-10-CM

## 2019-02-27 DIAGNOSIS — Z87.81 S/P ORIF (OPEN REDUCTION INTERNAL FIXATION) FRACTURE: ICD-10-CM

## 2019-02-27 DIAGNOSIS — S42.241P: Primary | ICD-10-CM

## 2019-02-27 PROCEDURE — 99213 OFFICE O/P EST LOW 20 MIN: CPT | Performed by: ORTHOPAEDIC SURGERY

## 2019-02-27 PROCEDURE — G8419 CALC BMI OUT NRM PARAM NOF/U: HCPCS | Performed by: ORTHOPAEDIC SURGERY

## 2019-02-27 PROCEDURE — 1036F TOBACCO NON-USER: CPT | Performed by: ORTHOPAEDIC SURGERY

## 2019-02-27 PROCEDURE — 3017F COLORECTAL CA SCREEN DOC REV: CPT | Performed by: ORTHOPAEDIC SURGERY

## 2019-02-27 PROCEDURE — G8598 ASA/ANTIPLAT THER USED: HCPCS | Performed by: ORTHOPAEDIC SURGERY

## 2019-02-27 PROCEDURE — G8484 FLU IMMUNIZE NO ADMIN: HCPCS | Performed by: ORTHOPAEDIC SURGERY

## 2019-02-27 PROCEDURE — G8427 DOCREV CUR MEDS BY ELIG CLIN: HCPCS | Performed by: ORTHOPAEDIC SURGERY

## 2019-02-28 ENCOUNTER — ANESTHESIA EVENT (OUTPATIENT)
Dept: OPERATING ROOM | Age: 64
DRG: 483 | End: 2019-02-28
Payer: COMMERCIAL

## 2019-03-01 ENCOUNTER — APPOINTMENT (OUTPATIENT)
Dept: GENERAL RADIOLOGY | Age: 64
DRG: 483 | End: 2019-03-01
Attending: ORTHOPAEDIC SURGERY
Payer: COMMERCIAL

## 2019-03-01 ENCOUNTER — HOSPITAL ENCOUNTER (INPATIENT)
Age: 64
LOS: 3 days | Discharge: HOME | DRG: 483 | End: 2019-03-04
Attending: ORTHOPAEDIC SURGERY | Admitting: ORTHOPAEDIC SURGERY
Payer: COMMERCIAL

## 2019-03-01 ENCOUNTER — ANESTHESIA (OUTPATIENT)
Dept: OPERATING ROOM | Age: 64
DRG: 483 | End: 2019-03-01
Payer: COMMERCIAL

## 2019-03-01 VITALS
DIASTOLIC BLOOD PRESSURE: 50 MMHG | OXYGEN SATURATION: 99 % | TEMPERATURE: 97.2 F | RESPIRATION RATE: 19 BRPM | SYSTOLIC BLOOD PRESSURE: 84 MMHG

## 2019-03-01 DIAGNOSIS — Z96.611 S/P REVERSE TOTAL SHOULDER ARTHROPLASTY, RIGHT: Primary | ICD-10-CM

## 2019-03-01 LAB
ABO/RH: NORMAL
ANTIBODY SCREEN: NORMAL
GLUCOSE BLD-MCNC: 173 MG/DL (ref 70–99)
GLUCOSE BLD-MCNC: 217 MG/DL (ref 70–99)
GLUCOSE BLD-MCNC: 333 MG/DL (ref 70–99)
PERFORMED ON: ABNORMAL

## 2019-03-01 PROCEDURE — 94664 DEMO&/EVAL PT USE INHALER: CPT

## 2019-03-01 PROCEDURE — 7100000001 HC PACU RECOVERY - ADDTL 15 MIN: Performed by: ORTHOPAEDIC SURGERY

## 2019-03-01 PROCEDURE — 3700000001 HC ADD 15 MINUTES (ANESTHESIA): Performed by: ORTHOPAEDIC SURGERY

## 2019-03-01 PROCEDURE — 73020 X-RAY EXAM OF SHOULDER: CPT

## 2019-03-01 PROCEDURE — 86901 BLOOD TYPING SEROLOGIC RH(D): CPT

## 2019-03-01 PROCEDURE — 2500000003 HC RX 250 WO HCPCS: Performed by: NURSE ANESTHETIST, CERTIFIED REGISTERED

## 2019-03-01 PROCEDURE — 2580000003 HC RX 258: Performed by: ORTHOPAEDIC SURGERY

## 2019-03-01 PROCEDURE — C1776 JOINT DEVICE (IMPLANTABLE): HCPCS | Performed by: ORTHOPAEDIC SURGERY

## 2019-03-01 PROCEDURE — 6370000000 HC RX 637 (ALT 250 FOR IP): Performed by: ORTHOPAEDIC SURGERY

## 2019-03-01 PROCEDURE — 6360000002 HC RX W HCPCS: Performed by: NURSE ANESTHETIST, CERTIFIED REGISTERED

## 2019-03-01 PROCEDURE — 3600000004 HC SURGERY LEVEL 4 BASE: Performed by: ORTHOPAEDIC SURGERY

## 2019-03-01 PROCEDURE — L3650 SO 8 ABD RESTRAINT PRE OTS: HCPCS | Performed by: ORTHOPAEDIC SURGERY

## 2019-03-01 PROCEDURE — 94150 VITAL CAPACITY TEST: CPT

## 2019-03-01 PROCEDURE — 6360000002 HC RX W HCPCS: Performed by: ANESTHESIOLOGY

## 2019-03-01 PROCEDURE — 64415 NJX AA&/STRD BRCH PLXS IMG: CPT | Performed by: ANESTHESIOLOGY

## 2019-03-01 PROCEDURE — 6370000000 HC RX 637 (ALT 250 FOR IP): Performed by: NURSE ANESTHETIST, CERTIFIED REGISTERED

## 2019-03-01 PROCEDURE — 6360000002 HC RX W HCPCS: Performed by: ORTHOPAEDIC SURGERY

## 2019-03-01 PROCEDURE — 3600000014 HC SURGERY LEVEL 4 ADDTL 15MIN: Performed by: ORTHOPAEDIC SURGERY

## 2019-03-01 PROCEDURE — 94761 N-INVAS EAR/PLS OXIMETRY MLT: CPT

## 2019-03-01 PROCEDURE — C9290 INJ, BUPIVACAINE LIPOSOME: HCPCS | Performed by: ANESTHESIOLOGY

## 2019-03-01 PROCEDURE — 7100000000 HC PACU RECOVERY - FIRST 15 MIN: Performed by: ORTHOPAEDIC SURGERY

## 2019-03-01 PROCEDURE — 86850 RBC ANTIBODY SCREEN: CPT

## 2019-03-01 PROCEDURE — C1713 ANCHOR/SCREW BN/BN,TIS/BN: HCPCS | Performed by: ORTHOPAEDIC SURGERY

## 2019-03-01 PROCEDURE — 0RPJ04Z REMOVAL OF INTERNAL FIXATION DEVICE FROM RIGHT SHOULDER JOINT, OPEN APPROACH: ICD-10-PCS | Performed by: ORTHOPAEDIC SURGERY

## 2019-03-01 PROCEDURE — 3209999900 FLUORO FOR SURGICAL PROCEDURES

## 2019-03-01 PROCEDURE — 94640 AIRWAY INHALATION TREATMENT: CPT

## 2019-03-01 PROCEDURE — 2580000003 HC RX 258: Performed by: NURSE ANESTHETIST, CERTIFIED REGISTERED

## 2019-03-01 PROCEDURE — 2500000003 HC RX 250 WO HCPCS: Performed by: ANESTHESIOLOGY

## 2019-03-01 PROCEDURE — 86900 BLOOD TYPING SEROLOGIC ABO: CPT

## 2019-03-01 PROCEDURE — 2709999900 HC NON-CHARGEABLE SUPPLY: Performed by: ORTHOPAEDIC SURGERY

## 2019-03-01 PROCEDURE — 1200000000 HC SEMI PRIVATE

## 2019-03-01 PROCEDURE — 2700000000 HC OXYGEN THERAPY PER DAY

## 2019-03-01 PROCEDURE — 0RRK00Z REPLACEMENT OF LEFT SHOULDER JOINT WITH REVERSE BALL AND SOCKET SYNTHETIC SUBSTITUTE, OPEN APPROACH: ICD-10-PCS | Performed by: ORTHOPAEDIC SURGERY

## 2019-03-01 PROCEDURE — 3700000000 HC ANESTHESIA ATTENDED CARE: Performed by: ORTHOPAEDIC SURGERY

## 2019-03-01 DEVICE — GLENOID, HEAD W/RETAINING SCREW, RSP, 32MM/-4MM
Type: IMPLANTABLE DEVICE | Site: SHOULDER | Status: FUNCTIONAL
Brand: DJO SURGICAL

## 2019-03-01 DEVICE — SCREW, LOCKING BONE, RSP, 5X18
Type: IMPLANTABLE DEVICE | Site: SHOULDER | Status: FUNCTIONAL
Brand: DJO SURGICAL

## 2019-03-01 DEVICE — Z DUP USE 2663688 INSERT HUM 32MM NEUT SM SOCK E PLUS ALTIVATE REV: Type: IMPLANTABLE DEVICE | Site: SHOULDER | Status: FUNCTIONAL

## 2019-03-01 DEVICE — IMPLANTABLE DEVICE: Type: IMPLANTABLE DEVICE | Site: SHOULDER | Status: FUNCTIONAL

## 2019-03-01 DEVICE — SCREW, LOCKING BONE, RSP, 5X26
Type: IMPLANTABLE DEVICE | Site: SHOULDER | Status: FUNCTIONAL
Brand: DJO SURGICAL

## 2019-03-01 DEVICE — Z INACTIVE BASEPLATE GLEN DIA30MM REV P2 COAT RSP: Type: IMPLANTABLE DEVICE | Site: SHOULDER | Status: FUNCTIONAL

## 2019-03-01 DEVICE — SCREW, LOCKING BONE, RSP, 5X30
Type: IMPLANTABLE DEVICE | Site: SHOULDER | Status: FUNCTIONAL
Brand: DJO SURGICAL

## 2019-03-01 DEVICE — SCREW, LOCKING BONE, RSP, 5X22
Type: IMPLANTABLE DEVICE | Site: SHOULDER | Status: FUNCTIONAL
Brand: DJO SURGICAL

## 2019-03-01 RX ORDER — GABAPENTIN 300 MG/1
300 CAPSULE ORAL 3 TIMES DAILY
Status: DISCONTINUED | OUTPATIENT
Start: 2019-03-01 | End: 2019-03-04 | Stop reason: HOSPADM

## 2019-03-01 RX ORDER — SODIUM CHLORIDE 0.9 % (FLUSH) 0.9 %
10 SYRINGE (ML) INJECTION PRN
Status: DISCONTINUED | OUTPATIENT
Start: 2019-03-01 | End: 2019-03-04 | Stop reason: HOSPADM

## 2019-03-01 RX ORDER — BUPIVACAINE HYDROCHLORIDE AND EPINEPHRINE 5; 5 MG/ML; UG/ML
INJECTION, SOLUTION EPIDURAL; INTRACAUDAL; PERINEURAL PRN
Status: DISCONTINUED | OUTPATIENT
Start: 2019-03-01 | End: 2019-03-01 | Stop reason: SDUPTHER

## 2019-03-01 RX ORDER — DOCUSATE SODIUM 100 MG/1
100 CAPSULE, LIQUID FILLED ORAL 2 TIMES DAILY
Status: DISCONTINUED | OUTPATIENT
Start: 2019-03-01 | End: 2019-03-04 | Stop reason: HOSPADM

## 2019-03-01 RX ORDER — LIDOCAINE HYDROCHLORIDE 20 MG/ML
INJECTION, SOLUTION EPIDURAL; INFILTRATION; INTRACAUDAL; PERINEURAL PRN
Status: DISCONTINUED | OUTPATIENT
Start: 2019-03-01 | End: 2019-03-01 | Stop reason: SDUPTHER

## 2019-03-01 RX ORDER — DEXTROSE MONOHYDRATE 25 G/50ML
12.5 INJECTION, SOLUTION INTRAVENOUS PRN
Status: DISCONTINUED | OUTPATIENT
Start: 2019-03-01 | End: 2019-03-04 | Stop reason: HOSPADM

## 2019-03-01 RX ORDER — ASPIRIN 81 MG/1
81 TABLET ORAL DAILY
Status: DISCONTINUED | OUTPATIENT
Start: 2019-03-01 | End: 2019-03-04 | Stop reason: HOSPADM

## 2019-03-01 RX ORDER — ATORVASTATIN CALCIUM 40 MG/1
40 TABLET, FILM COATED ORAL NIGHTLY
Status: DISCONTINUED | OUTPATIENT
Start: 2019-03-01 | End: 2019-03-04 | Stop reason: HOSPADM

## 2019-03-01 RX ORDER — FAMOTIDINE 20 MG/1
20 TABLET, FILM COATED ORAL 2 TIMES DAILY
Status: DISCONTINUED | OUTPATIENT
Start: 2019-03-01 | End: 2019-03-04 | Stop reason: HOSPADM

## 2019-03-01 RX ORDER — DEXTROSE MONOHYDRATE 50 MG/ML
100 INJECTION, SOLUTION INTRAVENOUS PRN
Status: DISCONTINUED | OUTPATIENT
Start: 2019-03-01 | End: 2019-03-04 | Stop reason: HOSPADM

## 2019-03-01 RX ORDER — SODIUM CHLORIDE, SODIUM LACTATE, POTASSIUM CHLORIDE, CALCIUM CHLORIDE 600; 310; 30; 20 MG/100ML; MG/100ML; MG/100ML; MG/100ML
INJECTION, SOLUTION INTRAVENOUS CONTINUOUS
Status: DISCONTINUED | OUTPATIENT
Start: 2019-03-01 | End: 2019-03-01

## 2019-03-01 RX ORDER — MIDAZOLAM HYDROCHLORIDE 1 MG/ML
INJECTION INTRAMUSCULAR; INTRAVENOUS PRN
Status: DISCONTINUED | OUTPATIENT
Start: 2019-03-01 | End: 2019-03-01 | Stop reason: SDUPTHER

## 2019-03-01 RX ORDER — MONTELUKAST SODIUM 10 MG/1
10 TABLET ORAL NIGHTLY
Status: DISCONTINUED | OUTPATIENT
Start: 2019-03-01 | End: 2019-03-04 | Stop reason: HOSPADM

## 2019-03-01 RX ORDER — AMLODIPINE BESYLATE 10 MG/1
10 TABLET ORAL DAILY
Status: DISCONTINUED | OUTPATIENT
Start: 2019-03-02 | End: 2019-03-04 | Stop reason: HOSPADM

## 2019-03-01 RX ORDER — FENTANYL CITRATE 50 UG/ML
100 INJECTION, SOLUTION INTRAMUSCULAR; INTRAVENOUS ONCE
Status: COMPLETED | OUTPATIENT
Start: 2019-03-01 | End: 2019-03-01

## 2019-03-01 RX ORDER — DEXAMETHASONE SODIUM PHOSPHATE 4 MG/ML
INJECTION, SOLUTION INTRA-ARTICULAR; INTRALESIONAL; INTRAMUSCULAR; INTRAVENOUS; SOFT TISSUE PRN
Status: DISCONTINUED | OUTPATIENT
Start: 2019-03-01 | End: 2019-03-01 | Stop reason: SDUPTHER

## 2019-03-01 RX ORDER — MIDAZOLAM HYDROCHLORIDE 1 MG/ML
INJECTION INTRAMUSCULAR; INTRAVENOUS PRN
Status: DISCONTINUED | OUTPATIENT
Start: 2019-03-01 | End: 2019-03-01

## 2019-03-01 RX ORDER — BENZTROPINE MESYLATE 1 MG/1
0.5 TABLET ORAL DAILY
Status: DISCONTINUED | OUTPATIENT
Start: 2019-03-02 | End: 2019-03-02

## 2019-03-01 RX ORDER — LEVOTHYROXINE SODIUM 0.07 MG/1
75 TABLET ORAL DAILY
Status: DISCONTINUED | OUTPATIENT
Start: 2019-03-02 | End: 2019-03-02

## 2019-03-01 RX ORDER — ALBUTEROL SULFATE 2.5 MG/3ML
2.5 SOLUTION RESPIRATORY (INHALATION) EVERY 4 HOURS PRN
Status: DISCONTINUED | OUTPATIENT
Start: 2019-03-01 | End: 2019-03-04 | Stop reason: HOSPADM

## 2019-03-01 RX ORDER — GLYCOPYRROLATE 0.2 MG/ML
INJECTION INTRAMUSCULAR; INTRAVENOUS PRN
Status: DISCONTINUED | OUTPATIENT
Start: 2019-03-01 | End: 2019-03-01 | Stop reason: SDUPTHER

## 2019-03-01 RX ORDER — SODIUM CHLORIDE 0.9 % (FLUSH) 0.9 %
10 SYRINGE (ML) INJECTION PRN
Status: DISCONTINUED | OUTPATIENT
Start: 2019-03-01 | End: 2019-03-01

## 2019-03-01 RX ORDER — NICOTINE POLACRILEX 4 MG
15 LOZENGE BUCCAL PRN
Status: DISCONTINUED | OUTPATIENT
Start: 2019-03-01 | End: 2019-03-04 | Stop reason: HOSPADM

## 2019-03-01 RX ORDER — ONDANSETRON 2 MG/ML
INJECTION INTRAMUSCULAR; INTRAVENOUS PRN
Status: DISCONTINUED | OUTPATIENT
Start: 2019-03-01 | End: 2019-03-01 | Stop reason: SDUPTHER

## 2019-03-01 RX ORDER — ONDANSETRON 2 MG/ML
4 INJECTION INTRAMUSCULAR; INTRAVENOUS EVERY 6 HOURS PRN
Status: DISCONTINUED | OUTPATIENT
Start: 2019-03-01 | End: 2019-03-04 | Stop reason: HOSPADM

## 2019-03-01 RX ORDER — QUETIAPINE FUMARATE 25 MG/1
25 TABLET, FILM COATED ORAL NIGHTLY
Status: DISCONTINUED | OUTPATIENT
Start: 2019-03-01 | End: 2019-03-04 | Stop reason: HOSPADM

## 2019-03-01 RX ORDER — SODIUM CHLORIDE 0.9 % (FLUSH) 0.9 %
10 SYRINGE (ML) INJECTION EVERY 12 HOURS SCHEDULED
Status: DISCONTINUED | OUTPATIENT
Start: 2019-03-01 | End: 2019-03-04 | Stop reason: HOSPADM

## 2019-03-01 RX ORDER — ROPINIROLE 2 MG/1
2 TABLET, FILM COATED ORAL NIGHTLY PRN
Status: DISCONTINUED | OUTPATIENT
Start: 2019-03-01 | End: 2019-03-04 | Stop reason: HOSPADM

## 2019-03-01 RX ORDER — LIDOCAINE HYDROCHLORIDE 10 MG/ML
1 INJECTION, SOLUTION EPIDURAL; INFILTRATION; INTRACAUDAL; PERINEURAL
Status: DISCONTINUED | OUTPATIENT
Start: 2019-03-01 | End: 2019-03-01

## 2019-03-01 RX ORDER — SODIUM CHLORIDE, SODIUM LACTATE, POTASSIUM CHLORIDE, CALCIUM CHLORIDE 600; 310; 30; 20 MG/100ML; MG/100ML; MG/100ML; MG/100ML
INJECTION, SOLUTION INTRAVENOUS CONTINUOUS PRN
Status: DISCONTINUED | OUTPATIENT
Start: 2019-03-01 | End: 2019-03-01 | Stop reason: SDUPTHER

## 2019-03-01 RX ORDER — CLOPIDOGREL BISULFATE 75 MG/1
75 TABLET ORAL ONCE
Status: DISCONTINUED | OUTPATIENT
Start: 2019-03-01 | End: 2019-03-04 | Stop reason: HOSPADM

## 2019-03-01 RX ORDER — TRAZODONE HYDROCHLORIDE 50 MG/1
25 TABLET ORAL NIGHTLY
Status: DISCONTINUED | OUTPATIENT
Start: 2019-03-01 | End: 2019-03-04 | Stop reason: HOSPADM

## 2019-03-01 RX ORDER — ALBUTEROL SULFATE 90 UG/1
AEROSOL, METERED RESPIRATORY (INHALATION) PRN
Status: DISCONTINUED | OUTPATIENT
Start: 2019-03-01 | End: 2019-03-01 | Stop reason: SDUPTHER

## 2019-03-01 RX ORDER — ROCURONIUM BROMIDE 10 MG/ML
INJECTION, SOLUTION INTRAVENOUS PRN
Status: DISCONTINUED | OUTPATIENT
Start: 2019-03-01 | End: 2019-03-01 | Stop reason: SDUPTHER

## 2019-03-01 RX ORDER — LIDOCAINE HYDROCHLORIDE 20 MG/ML
INJECTION, SOLUTION INFILTRATION; PERINEURAL PRN
Status: DISCONTINUED | OUTPATIENT
Start: 2019-03-01 | End: 2019-03-01

## 2019-03-01 RX ORDER — BUPIVACAINE HYDROCHLORIDE AND EPINEPHRINE 5; 5 MG/ML; UG/ML
INJECTION, SOLUTION EPIDURAL; INTRACAUDAL; PERINEURAL
Status: COMPLETED
Start: 2019-03-01 | End: 2019-03-01

## 2019-03-01 RX ORDER — CEFAZOLIN SODIUM 2 G/50ML
2 SOLUTION INTRAVENOUS EVERY 8 HOURS
Status: COMPLETED | OUTPATIENT
Start: 2019-03-01 | End: 2019-03-02

## 2019-03-01 RX ORDER — NEOSTIGMINE METHYLSULFATE 5 MG/5 ML
SYRINGE (ML) INTRAVENOUS PRN
Status: DISCONTINUED | OUTPATIENT
Start: 2019-03-01 | End: 2019-03-01 | Stop reason: SDUPTHER

## 2019-03-01 RX ORDER — SODIUM CHLORIDE 0.9 % (FLUSH) 0.9 %
10 SYRINGE (ML) INJECTION EVERY 12 HOURS SCHEDULED
Status: DISCONTINUED | OUTPATIENT
Start: 2019-03-01 | End: 2019-03-01

## 2019-03-01 RX ORDER — SODIUM CHLORIDE 9 MG/ML
INJECTION, SOLUTION INTRAVENOUS CONTINUOUS
Status: ACTIVE | OUTPATIENT
Start: 2019-03-01 | End: 2019-03-01

## 2019-03-01 RX ORDER — OXYCODONE HYDROCHLORIDE 5 MG/1
5 TABLET ORAL EVERY 4 HOURS PRN
Status: DISCONTINUED | OUTPATIENT
Start: 2019-03-01 | End: 2019-03-04 | Stop reason: HOSPADM

## 2019-03-01 RX ORDER — CYCLOBENZAPRINE HCL 10 MG
10 TABLET ORAL 3 TIMES DAILY PRN
Status: DISCONTINUED | OUTPATIENT
Start: 2019-03-01 | End: 2019-03-04 | Stop reason: HOSPADM

## 2019-03-01 RX ORDER — ALBUTEROL SULFATE 2.5 MG/3ML
2.5 SOLUTION RESPIRATORY (INHALATION) DAILY
Status: DISCONTINUED | OUTPATIENT
Start: 2019-03-02 | End: 2019-03-01

## 2019-03-01 RX ORDER — PROPOFOL 10 MG/ML
INJECTION, EMULSION INTRAVENOUS PRN
Status: DISCONTINUED | OUTPATIENT
Start: 2019-03-01 | End: 2019-03-01 | Stop reason: SDUPTHER

## 2019-03-01 RX ORDER — MAGNESIUM HYDROXIDE 1200 MG/15ML
LIQUID ORAL CONTINUOUS PRN
Status: COMPLETED | OUTPATIENT
Start: 2019-03-01 | End: 2019-03-01

## 2019-03-01 RX ADMIN — INSULIN GLARGINE 40 UNITS: 100 INJECTION, SOLUTION SUBCUTANEOUS at 21:46

## 2019-03-01 RX ADMIN — FENTANYL CITRATE 50 MCG: 50 INJECTION INTRAMUSCULAR; INTRAVENOUS at 09:55

## 2019-03-01 RX ADMIN — SODIUM CHLORIDE, POTASSIUM CHLORIDE, SODIUM LACTATE AND CALCIUM CHLORIDE: 600; 310; 30; 20 INJECTION, SOLUTION INTRAVENOUS at 09:16

## 2019-03-01 RX ADMIN — MONTELUKAST SODIUM 10 MG: 10 TABLET, FILM COATED ORAL at 20:08

## 2019-03-01 RX ADMIN — FAMOTIDINE 20 MG: 20 TABLET ORAL at 20:09

## 2019-03-01 RX ADMIN — ALBUTEROL SULFATE 2.5 MG: 2.5 SOLUTION RESPIRATORY (INHALATION) at 20:26

## 2019-03-01 RX ADMIN — ATORVASTATIN CALCIUM 40 MG: 40 TABLET, FILM COATED ORAL at 20:15

## 2019-03-01 RX ADMIN — QUETIAPINE FUMARATE 25 MG: 25 TABLET ORAL at 20:09

## 2019-03-01 RX ADMIN — LIDOCAINE HYDROCHLORIDE 50 MG: 20 INJECTION, SOLUTION EPIDURAL; INFILTRATION; INTRACAUDAL; PERINEURAL at 14:13

## 2019-03-01 RX ADMIN — INSULIN HUMAN 10 UNITS: 100 INJECTION, SOLUTION PARENTERAL at 21:46

## 2019-03-01 RX ADMIN — Medication 4 PUFF: at 11:11

## 2019-03-01 RX ADMIN — BUPIVACAINE HYDROCHLORIDE AND EPINEPHRINE BITARTRATE 10 ML: 5; .005 INJECTION, SOLUTION EPIDURAL; INTRACAUDAL; PERINEURAL at 10:00

## 2019-03-01 RX ADMIN — TRAZODONE HYDROCHLORIDE 25 MG: 50 TABLET ORAL at 20:08

## 2019-03-01 RX ADMIN — LIDOCAINE HYDROCHLORIDE 50 MG: 20 INJECTION, SOLUTION EPIDURAL; INFILTRATION; INTRACAUDAL; PERINEURAL at 10:55

## 2019-03-01 RX ADMIN — GLYCOPYRROLATE 0.5 MG: 0.2 INJECTION INTRAMUSCULAR; INTRAVENOUS at 13:51

## 2019-03-01 RX ADMIN — CEFTRIAXONE SODIUM 2 G: 2 INJECTION, POWDER, FOR SOLUTION INTRAMUSCULAR; INTRAVENOUS at 10:45

## 2019-03-01 RX ADMIN — GABAPENTIN 300 MG: 300 CAPSULE ORAL at 20:08

## 2019-03-01 RX ADMIN — CARBIDOPA AND LEVODOPA 3 TABLET: 25; 100 TABLET ORAL at 20:07

## 2019-03-01 RX ADMIN — ASPIRIN 81 MG: 81 TABLET, COATED ORAL at 20:16

## 2019-03-01 RX ADMIN — DEXAMETHASONE SODIUM PHOSPHATE 4 MG: 4 INJECTION, SOLUTION INTRAMUSCULAR; INTRAVENOUS at 10:59

## 2019-03-01 RX ADMIN — CEFAZOLIN SODIUM 2 G: 2 SOLUTION INTRAVENOUS at 20:10

## 2019-03-01 RX ADMIN — CYCLOBENZAPRINE HYDROCHLORIDE 10 MG: 10 TABLET, FILM COATED ORAL at 20:08

## 2019-03-01 RX ADMIN — VANCOMYCIN HYDROCHLORIDE 1000 MG: 10 INJECTION, POWDER, LYOPHILIZED, FOR SOLUTION INTRAVENOUS at 10:45

## 2019-03-01 RX ADMIN — OXYCODONE HYDROCHLORIDE 5 MG: 5 TABLET ORAL at 20:08

## 2019-03-01 RX ADMIN — BUPIVACAINE 10 ML: 13.3 INJECTION, SUSPENSION, LIPOSOMAL INFILTRATION at 10:02

## 2019-03-01 RX ADMIN — SUGAMMADEX 200 MG: 100 INJECTION, SOLUTION INTRAVENOUS at 14:25

## 2019-03-01 RX ADMIN — TIOTROPIUM BROMIDE 18 MCG: 18 CAPSULE ORAL; RESPIRATORY (INHALATION) at 20:26

## 2019-03-01 RX ADMIN — SODIUM CHLORIDE, SODIUM LACTATE, POTASSIUM CHLORIDE, AND CALCIUM CHLORIDE: 600; 310; 30; 20 INJECTION, SOLUTION INTRAVENOUS at 10:49

## 2019-03-01 RX ADMIN — Medication 3 MG: at 13:51

## 2019-03-01 RX ADMIN — SODIUM CHLORIDE: 9 INJECTION, SOLUTION INTRAVENOUS at 17:26

## 2019-03-01 RX ADMIN — ROCURONIUM BROMIDE 30 MG: 10 INJECTION, SOLUTION INTRAVENOUS at 12:26

## 2019-03-01 RX ADMIN — PROPOFOL 100 MG: 10 INJECTION, EMULSION INTRAVENOUS at 10:55

## 2019-03-01 RX ADMIN — ONDANSETRON 4 MG: 2 INJECTION INTRAMUSCULAR; INTRAVENOUS at 10:59

## 2019-03-01 RX ADMIN — ROCURONIUM BROMIDE 50 MG: 10 INJECTION, SOLUTION INTRAVENOUS at 10:55

## 2019-03-01 RX ADMIN — MIDAZOLAM HYDROCHLORIDE 1 MG: 2 INJECTION, SOLUTION INTRAMUSCULAR; INTRAVENOUS at 10:49

## 2019-03-01 ASSESSMENT — PULMONARY FUNCTION TESTS
PIF_VALUE: 23
PIF_VALUE: 20
PIF_VALUE: 21
PIF_VALUE: 19
PIF_VALUE: 6
PIF_VALUE: 19
PIF_VALUE: 21
PIF_VALUE: 21
PIF_VALUE: 23
PIF_VALUE: 21
PIF_VALUE: 22
PIF_VALUE: 21
PIF_VALUE: 21
PIF_VALUE: 22
PIF_VALUE: 21
PIF_VALUE: 23
PIF_VALUE: 22
PIF_VALUE: 20
PIF_VALUE: 19
PIF_VALUE: 19
PIF_VALUE: 21
PIF_VALUE: 22
PIF_VALUE: 21
PIF_VALUE: 19
PIF_VALUE: 20
PIF_VALUE: 22
PIF_VALUE: 22
PIF_VALUE: 21
PIF_VALUE: 20
PIF_VALUE: 20
PIF_VALUE: 21
PIF_VALUE: 20
PIF_VALUE: 19
PIF_VALUE: 20
PIF_VALUE: 21
PIF_VALUE: 22
PIF_VALUE: 20
PIF_VALUE: 1
PIF_VALUE: 21
PIF_VALUE: 19
PIF_VALUE: 0
PIF_VALUE: 19
PIF_VALUE: 20
PIF_VALUE: 21
PIF_VALUE: 19
PIF_VALUE: 22
PIF_VALUE: 22
PIF_VALUE: 3
PIF_VALUE: 21
PIF_VALUE: 23
PIF_VALUE: 14
PIF_VALUE: 19
PIF_VALUE: 10
PIF_VALUE: 21
PIF_VALUE: 1
PIF_VALUE: 20
PIF_VALUE: 22
PIF_VALUE: 19
PIF_VALUE: 20
PIF_VALUE: 20
PIF_VALUE: 22
PIF_VALUE: 2
PIF_VALUE: 2
PIF_VALUE: 24
PIF_VALUE: 2
PIF_VALUE: 20
PIF_VALUE: 19
PIF_VALUE: 20
PIF_VALUE: 20
PIF_VALUE: 18
PIF_VALUE: 19
PIF_VALUE: 21
PIF_VALUE: 19
PIF_VALUE: 23
PIF_VALUE: 3
PIF_VALUE: 21
PIF_VALUE: 19
PIF_VALUE: 21
PIF_VALUE: 20
PIF_VALUE: 22
PIF_VALUE: 22
PIF_VALUE: 20
PIF_VALUE: 19
PIF_VALUE: 15
PIF_VALUE: 20
PIF_VALUE: 25
PIF_VALUE: 22
PIF_VALUE: 19
PIF_VALUE: 19
PIF_VALUE: 1
PIF_VALUE: 22
PIF_VALUE: 20
PIF_VALUE: 21
PIF_VALUE: 22
PIF_VALUE: 20
PIF_VALUE: 19
PIF_VALUE: 20
PIF_VALUE: 20
PIF_VALUE: 5
PIF_VALUE: 27
PIF_VALUE: 21
PIF_VALUE: 20
PIF_VALUE: 1
PIF_VALUE: 20
PIF_VALUE: 20
PIF_VALUE: 19
PIF_VALUE: 20
PIF_VALUE: 20
PIF_VALUE: 3
PIF_VALUE: 20
PIF_VALUE: 19
PIF_VALUE: 15
PIF_VALUE: 3
PIF_VALUE: 20
PIF_VALUE: 24
PIF_VALUE: 26
PIF_VALUE: 20
PIF_VALUE: 22
PIF_VALUE: 0
PIF_VALUE: 20
PIF_VALUE: 20
PIF_VALUE: 21
PIF_VALUE: 22
PIF_VALUE: 22
PIF_VALUE: 20
PIF_VALUE: 15
PIF_VALUE: 23
PIF_VALUE: 19
PIF_VALUE: 13
PIF_VALUE: 21
PIF_VALUE: 19
PIF_VALUE: 19
PIF_VALUE: 20
PIF_VALUE: 20
PIF_VALUE: 2
PIF_VALUE: 4
PIF_VALUE: 19
PIF_VALUE: 21
PIF_VALUE: 20
PIF_VALUE: 25
PIF_VALUE: 19
PIF_VALUE: 19
PIF_VALUE: 21
PIF_VALUE: 21
PIF_VALUE: 20
PIF_VALUE: 19
PIF_VALUE: 24
PIF_VALUE: 5
PIF_VALUE: 19
PIF_VALUE: 23
PIF_VALUE: 20
PIF_VALUE: 19
PIF_VALUE: 19
PIF_VALUE: 4
PIF_VALUE: 20
PIF_VALUE: 20
PIF_VALUE: 22
PIF_VALUE: 12
PIF_VALUE: 21
PIF_VALUE: 20
PIF_VALUE: 19
PIF_VALUE: 21
PIF_VALUE: 21
PIF_VALUE: 19
PIF_VALUE: 19
PIF_VALUE: 3
PIF_VALUE: 23
PIF_VALUE: 19
PIF_VALUE: 22
PIF_VALUE: 21
PIF_VALUE: 21
PIF_VALUE: 22
PIF_VALUE: 23
PIF_VALUE: 20
PIF_VALUE: 20
PIF_VALUE: 19
PIF_VALUE: 22
PIF_VALUE: 20
PIF_VALUE: 2
PIF_VALUE: 22
PIF_VALUE: 23
PIF_VALUE: 21
PIF_VALUE: 22
PIF_VALUE: 21
PIF_VALUE: 19
PIF_VALUE: 20
PIF_VALUE: 21
PIF_VALUE: 20
PIF_VALUE: 22
PIF_VALUE: 19
PIF_VALUE: 22
PIF_VALUE: 21
PIF_VALUE: 6
PIF_VALUE: 21
PIF_VALUE: 20
PIF_VALUE: 25
PIF_VALUE: 22
PIF_VALUE: 5
PIF_VALUE: 19
PIF_VALUE: 4
PIF_VALUE: 19
PIF_VALUE: 19
PIF_VALUE: 21
PIF_VALUE: 21
PIF_VALUE: 23
PIF_VALUE: 19
PIF_VALUE: 20
PIF_VALUE: 19
PIF_VALUE: 21
PIF_VALUE: 19
PIF_VALUE: 20
PIF_VALUE: 20
PIF_VALUE: 19

## 2019-03-01 ASSESSMENT — PAIN DESCRIPTION - PROGRESSION: CLINICAL_PROGRESSION: NOT CHANGED

## 2019-03-01 ASSESSMENT — PAIN DESCRIPTION - LOCATION: LOCATION: NECK

## 2019-03-01 ASSESSMENT — PAIN DESCRIPTION - FREQUENCY: FREQUENCY: CONTINUOUS

## 2019-03-01 ASSESSMENT — PAIN DESCRIPTION - ONSET: ONSET: ON-GOING

## 2019-03-01 ASSESSMENT — PAIN DESCRIPTION - ORIENTATION: ORIENTATION: POSTERIOR

## 2019-03-01 ASSESSMENT — PAIN DESCRIPTION - DESCRIPTORS
DESCRIPTORS: ACHING
DESCRIPTORS: ACHING

## 2019-03-01 ASSESSMENT — PAIN SCALES - GENERAL
PAINLEVEL_OUTOF10: 5
PAINLEVEL_OUTOF10: 0
PAINLEVEL_OUTOF10: 0
PAINLEVEL_OUTOF10: 10
PAINLEVEL_OUTOF10: 0
PAINLEVEL_OUTOF10: 0

## 2019-03-01 ASSESSMENT — PAIN DESCRIPTION - PAIN TYPE: TYPE: SURGICAL PAIN

## 2019-03-01 ASSESSMENT — PAIN - FUNCTIONAL ASSESSMENT
PAIN_FUNCTIONAL_ASSESSMENT: 0-10
PAIN_FUNCTIONAL_ASSESSMENT: PREVENTS OR INTERFERES SOME ACTIVE ACTIVITIES AND ADLS

## 2019-03-02 PROBLEM — I25.10 CAD (CORONARY ARTERY DISEASE): Chronic | Status: ACTIVE | Noted: 2019-03-02

## 2019-03-02 PROBLEM — J44.9 COPD (CHRONIC OBSTRUCTIVE PULMONARY DISEASE) (HCC): Chronic | Status: ACTIVE | Noted: 2019-03-02

## 2019-03-02 PROBLEM — I10 HYPERTENSION: Chronic | Status: ACTIVE | Noted: 2019-03-02

## 2019-03-02 PROBLEM — E11.9 DIABETES MELLITUS (HCC): Chronic | Status: ACTIVE | Noted: 2019-03-02

## 2019-03-02 LAB
ANION GAP SERPL CALCULATED.3IONS-SCNC: 9 MMOL/L (ref 3–16)
BUN BLDV-MCNC: 21 MG/DL (ref 7–20)
CALCIUM SERPL-MCNC: 8.5 MG/DL (ref 8.3–10.6)
CHLORIDE BLD-SCNC: 102 MMOL/L (ref 99–110)
CO2: 24 MMOL/L (ref 21–32)
CREAT SERPL-MCNC: 0.7 MG/DL (ref 0.6–1.2)
GFR AFRICAN AMERICAN: >60
GFR NON-AFRICAN AMERICAN: >60
GLUCOSE BLD-MCNC: 137 MG/DL (ref 70–99)
GLUCOSE BLD-MCNC: 176 MG/DL (ref 70–99)
GLUCOSE BLD-MCNC: 200 MG/DL (ref 70–99)
GLUCOSE BLD-MCNC: 205 MG/DL (ref 70–99)
GLUCOSE BLD-MCNC: 242 MG/DL (ref 70–99)
HCT VFR BLD CALC: 24.3 % (ref 36–48)
HEMOGLOBIN: 7.8 G/DL (ref 12–16)
MCH RBC QN AUTO: 25.7 PG (ref 26–34)
MCHC RBC AUTO-ENTMCNC: 32.1 G/DL (ref 31–36)
MCV RBC AUTO: 80 FL (ref 80–100)
PDW BLD-RTO: 18.5 % (ref 12.4–15.4)
PERFORMED ON: ABNORMAL
PLATELET # BLD: 222 K/UL (ref 135–450)
PMV BLD AUTO: 7.8 FL (ref 5–10.5)
POTASSIUM REFLEX MAGNESIUM: 4.4 MMOL/L (ref 3.5–5.1)
RBC # BLD: 3.03 M/UL (ref 4–5.2)
SODIUM BLD-SCNC: 135 MMOL/L (ref 136–145)
WBC # BLD: 8 K/UL (ref 4–11)

## 2019-03-02 PROCEDURE — 6370000000 HC RX 637 (ALT 250 FOR IP): Performed by: FAMILY MEDICINE

## 2019-03-02 PROCEDURE — 97530 THERAPEUTIC ACTIVITIES: CPT

## 2019-03-02 PROCEDURE — 2700000000 HC OXYGEN THERAPY PER DAY

## 2019-03-02 PROCEDURE — 94640 AIRWAY INHALATION TREATMENT: CPT

## 2019-03-02 PROCEDURE — 85027 COMPLETE CBC AUTOMATED: CPT

## 2019-03-02 PROCEDURE — 97535 SELF CARE MNGMENT TRAINING: CPT

## 2019-03-02 PROCEDURE — 94150 VITAL CAPACITY TEST: CPT

## 2019-03-02 PROCEDURE — 36415 COLL VENOUS BLD VENIPUNCTURE: CPT

## 2019-03-02 PROCEDURE — 80048 BASIC METABOLIC PNL TOTAL CA: CPT

## 2019-03-02 PROCEDURE — 94761 N-INVAS EAR/PLS OXIMETRY MLT: CPT

## 2019-03-02 PROCEDURE — 2580000003 HC RX 258: Performed by: ORTHOPAEDIC SURGERY

## 2019-03-02 PROCEDURE — 6360000002 HC RX W HCPCS: Performed by: ORTHOPAEDIC SURGERY

## 2019-03-02 PROCEDURE — 97166 OT EVAL MOD COMPLEX 45 MIN: CPT

## 2019-03-02 PROCEDURE — 6370000000 HC RX 637 (ALT 250 FOR IP): Performed by: ORTHOPAEDIC SURGERY

## 2019-03-02 PROCEDURE — 6360000002 HC RX W HCPCS: Performed by: FAMILY MEDICINE

## 2019-03-02 PROCEDURE — 1200000000 HC SEMI PRIVATE

## 2019-03-02 RX ORDER — LEVOTHYROXINE SODIUM 0.1 MG/1
100 TABLET ORAL DAILY
Status: DISCONTINUED | OUTPATIENT
Start: 2019-03-03 | End: 2019-03-04 | Stop reason: HOSPADM

## 2019-03-02 RX ORDER — BUPRENORPHINE 10 UG/H
1 PATCH TRANSDERMAL WEEKLY
COMMUNITY
End: 2021-11-01

## 2019-03-02 RX ORDER — LEVOTHYROXINE SODIUM 0.1 MG/1
100 TABLET ORAL DAILY
COMMUNITY

## 2019-03-02 RX ORDER — BENZTROPINE MESYLATE 1 MG/1
0.5 TABLET ORAL 2 TIMES DAILY
Status: DISCONTINUED | OUTPATIENT
Start: 2019-03-02 | End: 2019-03-04 | Stop reason: HOSPADM

## 2019-03-02 RX ORDER — OXYCODONE HYDROCHLORIDE AND ACETAMINOPHEN 5; 325 MG/1; MG/1
1-2 TABLET ORAL
Qty: 28 TABLET | Refills: 0 | Status: SHIPPED | OUTPATIENT
Start: 2019-03-02 | End: 2019-03-13 | Stop reason: SDUPTHER

## 2019-03-02 RX ORDER — PSEUDOEPHEDRINE HCL 30 MG
100 TABLET ORAL 2 TIMES DAILY
Qty: 40 CAPSULE | Refills: 0 | Status: SHIPPED | OUTPATIENT
Start: 2019-03-02 | End: 2021-11-01

## 2019-03-02 RX ORDER — OMEPRAZOLE 40 MG/1
40 CAPSULE, DELAYED RELEASE ORAL DAILY
COMMUNITY

## 2019-03-02 RX ORDER — ALBUTEROL SULFATE 90 UG/1
2 AEROSOL, METERED RESPIRATORY (INHALATION)
COMMUNITY

## 2019-03-02 RX ORDER — BENZTROPINE MESYLATE 0.5 MG/1
0.5 TABLET ORAL 2 TIMES DAILY
COMMUNITY

## 2019-03-02 RX ORDER — ATORVASTATIN CALCIUM 40 MG/1
40 TABLET, FILM COATED ORAL NIGHTLY
COMMUNITY
End: 2021-11-01

## 2019-03-02 RX ORDER — ALBUTEROL SULFATE 2.5 MG/3ML
2.5 SOLUTION RESPIRATORY (INHALATION) 4 TIMES DAILY
Status: DISCONTINUED | OUTPATIENT
Start: 2019-03-02 | End: 2019-03-04 | Stop reason: HOSPADM

## 2019-03-02 RX ORDER — DICYCLOMINE HYDROCHLORIDE 10 MG/1
10 CAPSULE ORAL
COMMUNITY

## 2019-03-02 RX ORDER — PROMETHAZINE HYDROCHLORIDE 12.5 MG/1
12.5 TABLET ORAL 4 TIMES DAILY PRN
Qty: 20 TABLET | Refills: 0 | Status: SHIPPED | OUTPATIENT
Start: 2019-03-02 | End: 2019-03-09

## 2019-03-02 RX ADMIN — OXYCODONE HYDROCHLORIDE 5 MG: 5 TABLET ORAL at 12:10

## 2019-03-02 RX ADMIN — LINAGLIPTIN 5 MG: 5 TABLET, FILM COATED ORAL at 09:12

## 2019-03-02 RX ADMIN — INSULIN GLARGINE 40 UNITS: 100 INJECTION, SOLUTION SUBCUTANEOUS at 20:34

## 2019-03-02 RX ADMIN — Medication 10 ML: at 20:50

## 2019-03-02 RX ADMIN — LEVOTHYROXINE SODIUM 75 MCG: 75 TABLET ORAL at 09:10

## 2019-03-02 RX ADMIN — TRAZODONE HYDROCHLORIDE 25 MG: 50 TABLET ORAL at 20:33

## 2019-03-02 RX ADMIN — OXYCODONE HYDROCHLORIDE 5 MG: 5 TABLET ORAL at 17:00

## 2019-03-02 RX ADMIN — TIOTROPIUM BROMIDE 18 MCG: 18 CAPSULE ORAL; RESPIRATORY (INHALATION) at 20:57

## 2019-03-02 RX ADMIN — CARBIDOPA AND LEVODOPA 3 TABLET: 25; 100 TABLET ORAL at 09:12

## 2019-03-02 RX ADMIN — MONTELUKAST SODIUM 10 MG: 10 TABLET, FILM COATED ORAL at 20:34

## 2019-03-02 RX ADMIN — HYDROMORPHONE HYDROCHLORIDE 0.5 MG: 1 INJECTION, SOLUTION INTRAMUSCULAR; INTRAVENOUS; SUBCUTANEOUS at 20:34

## 2019-03-02 RX ADMIN — HYDROMORPHONE HYDROCHLORIDE 0.5 MG: 1 INJECTION, SOLUTION INTRAMUSCULAR; INTRAVENOUS; SUBCUTANEOUS at 05:35

## 2019-03-02 RX ADMIN — OXYCODONE HYDROCHLORIDE 5 MG: 5 TABLET ORAL at 08:00

## 2019-03-02 RX ADMIN — DOCUSATE SODIUM 100 MG: 100 CAPSULE, LIQUID FILLED ORAL at 20:34

## 2019-03-02 RX ADMIN — ASPIRIN 81 MG: 81 TABLET, COATED ORAL at 09:12

## 2019-03-02 RX ADMIN — LURASIDONE HYDROCHLORIDE 60 MG: 40 TABLET, FILM COATED ORAL at 09:12

## 2019-03-02 RX ADMIN — GABAPENTIN 300 MG: 300 CAPSULE ORAL at 09:12

## 2019-03-02 RX ADMIN — FAMOTIDINE 20 MG: 20 TABLET ORAL at 09:10

## 2019-03-02 RX ADMIN — GABAPENTIN 300 MG: 300 CAPSULE ORAL at 20:32

## 2019-03-02 RX ADMIN — CARBIDOPA AND LEVODOPA 3 TABLET: 25; 100 TABLET ORAL at 14:31

## 2019-03-02 RX ADMIN — DOCUSATE SODIUM 100 MG: 100 CAPSULE, LIQUID FILLED ORAL at 09:12

## 2019-03-02 RX ADMIN — INSULIN HUMAN 6 UNITS: 100 INJECTION, SOLUTION PARENTERAL at 17:43

## 2019-03-02 RX ADMIN — GABAPENTIN 300 MG: 300 CAPSULE ORAL at 14:31

## 2019-03-02 RX ADMIN — Medication 10 ML: at 09:14

## 2019-03-02 RX ADMIN — CARBIDOPA AND LEVODOPA 3 TABLET: 25; 100 TABLET ORAL at 20:33

## 2019-03-02 RX ADMIN — CEFAZOLIN SODIUM 2 G: 2 SOLUTION INTRAVENOUS at 05:34

## 2019-03-02 RX ADMIN — FAMOTIDINE 20 MG: 20 TABLET ORAL at 20:32

## 2019-03-02 RX ADMIN — BENZTROPINE MESYLATE 0.5 MG: 1 TABLET ORAL at 09:11

## 2019-03-02 RX ADMIN — VORTIOXETINE 10 MG: 10 TABLET, FILM COATED ORAL at 09:12

## 2019-03-02 RX ADMIN — ALBUTEROL SULFATE 2.5 MG: 2.5 SOLUTION RESPIRATORY (INHALATION) at 20:57

## 2019-03-02 RX ADMIN — ATORVASTATIN CALCIUM 40 MG: 40 TABLET, FILM COATED ORAL at 20:34

## 2019-03-02 RX ADMIN — INSULIN HUMAN 6 UNITS: 100 INJECTION, SOLUTION PARENTERAL at 12:13

## 2019-03-02 RX ADMIN — INSULIN HUMAN 3 UNITS: 100 INJECTION, SOLUTION PARENTERAL at 09:13

## 2019-03-02 RX ADMIN — BENZTROPINE MESYLATE 0.5 MG: 1 TABLET ORAL at 20:32

## 2019-03-02 RX ADMIN — QUETIAPINE FUMARATE 25 MG: 25 TABLET ORAL at 20:33

## 2019-03-02 ASSESSMENT — PAIN SCALES - GENERAL
PAINLEVEL_OUTOF10: 6
PAINLEVEL_OUTOF10: 0
PAINLEVEL_OUTOF10: 0
PAINLEVEL_OUTOF10: 6
PAINLEVEL_OUTOF10: 7
PAINLEVEL_OUTOF10: 9
PAINLEVEL_OUTOF10: 7
PAINLEVEL_OUTOF10: 7
PAINLEVEL_OUTOF10: 6

## 2019-03-02 ASSESSMENT — PAIN DESCRIPTION - ONSET
ONSET: ON-GOING

## 2019-03-02 ASSESSMENT — PAIN DESCRIPTION - LOCATION
LOCATION: ARM

## 2019-03-02 ASSESSMENT — PAIN DESCRIPTION - FREQUENCY
FREQUENCY: CONTINUOUS

## 2019-03-02 ASSESSMENT — PAIN DESCRIPTION - PAIN TYPE
TYPE: SURGICAL PAIN

## 2019-03-02 ASSESSMENT — PAIN DESCRIPTION - PROGRESSION
CLINICAL_PROGRESSION: GRADUALLY WORSENING

## 2019-03-02 ASSESSMENT — PAIN - FUNCTIONAL ASSESSMENT
PAIN_FUNCTIONAL_ASSESSMENT: PREVENTS OR INTERFERES SOME ACTIVE ACTIVITIES AND ADLS

## 2019-03-02 ASSESSMENT — PAIN DESCRIPTION - ORIENTATION
ORIENTATION: RIGHT

## 2019-03-02 ASSESSMENT — PAIN DESCRIPTION - DESCRIPTORS
DESCRIPTORS: ACHING

## 2019-03-03 LAB
GLUCOSE BLD-MCNC: 122 MG/DL (ref 70–99)
GLUCOSE BLD-MCNC: 216 MG/DL (ref 70–99)
GLUCOSE BLD-MCNC: 277 MG/DL (ref 70–99)
GLUCOSE BLD-MCNC: 370 MG/DL (ref 70–99)
PERFORMED ON: ABNORMAL

## 2019-03-03 PROCEDURE — 2700000000 HC OXYGEN THERAPY PER DAY

## 2019-03-03 PROCEDURE — 94640 AIRWAY INHALATION TREATMENT: CPT

## 2019-03-03 PROCEDURE — 1200000000 HC SEMI PRIVATE

## 2019-03-03 PROCEDURE — 6360000002 HC RX W HCPCS: Performed by: FAMILY MEDICINE

## 2019-03-03 PROCEDURE — 94150 VITAL CAPACITY TEST: CPT

## 2019-03-03 PROCEDURE — 94761 N-INVAS EAR/PLS OXIMETRY MLT: CPT

## 2019-03-03 PROCEDURE — 6360000002 HC RX W HCPCS: Performed by: ORTHOPAEDIC SURGERY

## 2019-03-03 PROCEDURE — 6370000000 HC RX 637 (ALT 250 FOR IP): Performed by: ORTHOPAEDIC SURGERY

## 2019-03-03 PROCEDURE — 97530 THERAPEUTIC ACTIVITIES: CPT

## 2019-03-03 PROCEDURE — 6370000000 HC RX 637 (ALT 250 FOR IP): Performed by: FAMILY MEDICINE

## 2019-03-03 PROCEDURE — 2580000003 HC RX 258: Performed by: ORTHOPAEDIC SURGERY

## 2019-03-03 PROCEDURE — 94680 O2 UPTK RST&XERS DIR SIMPLE: CPT

## 2019-03-03 PROCEDURE — 94645 CONT INHLJ TX EACH ADDL HOUR: CPT

## 2019-03-03 PROCEDURE — 97535 SELF CARE MNGMENT TRAINING: CPT

## 2019-03-03 RX ORDER — DEXAMETHASONE SODIUM PHOSPHATE 4 MG/ML
6 INJECTION, SOLUTION INTRA-ARTICULAR; INTRALESIONAL; INTRAMUSCULAR; INTRAVENOUS; SOFT TISSUE ONCE
Status: DISCONTINUED | OUTPATIENT
Start: 2019-03-04 | End: 2019-03-03

## 2019-03-03 RX ORDER — DEXAMETHASONE SODIUM PHOSPHATE 4 MG/ML
4 INJECTION, SOLUTION INTRA-ARTICULAR; INTRALESIONAL; INTRAMUSCULAR; INTRAVENOUS; SOFT TISSUE ONCE
Status: COMPLETED | OUTPATIENT
Start: 2019-03-03 | End: 2019-03-03

## 2019-03-03 RX ORDER — DEXAMETHASONE SODIUM PHOSPHATE 4 MG/ML
6 INJECTION, SOLUTION INTRA-ARTICULAR; INTRALESIONAL; INTRAMUSCULAR; INTRAVENOUS; SOFT TISSUE ONCE
Status: COMPLETED | OUTPATIENT
Start: 2019-03-03 | End: 2019-03-03

## 2019-03-03 RX ORDER — BUDESONIDE 0.25 MG/2ML
0.25 INHALANT ORAL 2 TIMES DAILY
Status: DISCONTINUED | OUTPATIENT
Start: 2019-03-03 | End: 2019-03-04 | Stop reason: HOSPADM

## 2019-03-03 RX ORDER — FORMOTEROL FUMARATE 20 UG/2ML
20 SOLUTION RESPIRATORY (INHALATION) 2 TIMES DAILY
Status: DISCONTINUED | OUTPATIENT
Start: 2019-03-03 | End: 2019-03-04 | Stop reason: HOSPADM

## 2019-03-03 RX ADMIN — Medication 10 ML: at 11:43

## 2019-03-03 RX ADMIN — LURASIDONE HYDROCHLORIDE 60 MG: 40 TABLET, FILM COATED ORAL at 07:06

## 2019-03-03 RX ADMIN — BUDESONIDE 250 MCG: 0.25 SUSPENSION RESPIRATORY (INHALATION) at 20:03

## 2019-03-03 RX ADMIN — ALBUTEROL SULFATE 2.5 MG: 2.5 SOLUTION RESPIRATORY (INHALATION) at 16:41

## 2019-03-03 RX ADMIN — INSULIN GLARGINE 40 UNITS: 100 INJECTION, SOLUTION SUBCUTANEOUS at 21:44

## 2019-03-03 RX ADMIN — METOPROLOL TARTRATE 25 MG: 25 TABLET ORAL at 12:29

## 2019-03-03 RX ADMIN — QUETIAPINE FUMARATE 25 MG: 25 TABLET ORAL at 21:41

## 2019-03-03 RX ADMIN — FORMOTEROL FUMARATE DIHYDRATE 20 MCG: 20 SOLUTION RESPIRATORY (INHALATION) at 20:03

## 2019-03-03 RX ADMIN — BENZTROPINE MESYLATE 0.5 MG: 1 TABLET ORAL at 00:00

## 2019-03-03 RX ADMIN — METOPROLOL TARTRATE 25 MG: 25 TABLET ORAL at 21:40

## 2019-03-03 RX ADMIN — FAMOTIDINE 20 MG: 20 TABLET ORAL at 21:40

## 2019-03-03 RX ADMIN — FAMOTIDINE 20 MG: 20 TABLET ORAL at 09:34

## 2019-03-03 RX ADMIN — ALBUTEROL SULFATE 2.5 MG: 2.5 SOLUTION RESPIRATORY (INHALATION) at 11:17

## 2019-03-03 RX ADMIN — FORMOTEROL FUMARATE DIHYDRATE 20 MCG: 20 SOLUTION RESPIRATORY (INHALATION) at 09:19

## 2019-03-03 RX ADMIN — CARBIDOPA AND LEVODOPA 3 TABLET: 25; 100 TABLET ORAL at 21:54

## 2019-03-03 RX ADMIN — INSULIN HUMAN 6 UNITS: 100 INJECTION, SOLUTION PARENTERAL at 12:30

## 2019-03-03 RX ADMIN — ALBUTEROL SULFATE 2.5 MG: 2.5 SOLUTION RESPIRATORY (INHALATION) at 09:19

## 2019-03-03 RX ADMIN — GABAPENTIN 300 MG: 300 CAPSULE ORAL at 21:40

## 2019-03-03 RX ADMIN — GABAPENTIN 300 MG: 300 CAPSULE ORAL at 14:41

## 2019-03-03 RX ADMIN — BUDESONIDE 250 MCG: 0.25 SUSPENSION RESPIRATORY (INHALATION) at 09:19

## 2019-03-03 RX ADMIN — LEVOTHYROXINE SODIUM 100 MCG: 100 TABLET ORAL at 07:05

## 2019-03-03 RX ADMIN — Medication 10 ML: at 21:41

## 2019-03-03 RX ADMIN — OXYCODONE HYDROCHLORIDE 5 MG: 5 TABLET ORAL at 03:53

## 2019-03-03 RX ADMIN — INSULIN HUMAN 8 UNITS: 100 INJECTION, SOLUTION PARENTERAL at 17:07

## 2019-03-03 RX ADMIN — CARBIDOPA AND LEVODOPA 3 TABLET: 25; 100 TABLET ORAL at 14:41

## 2019-03-03 RX ADMIN — BENZTROPINE MESYLATE 0.5 MG: 1 TABLET ORAL at 21:41

## 2019-03-03 RX ADMIN — TIOTROPIUM BROMIDE 18 MCG: 18 CAPSULE ORAL; RESPIRATORY (INHALATION) at 20:16

## 2019-03-03 RX ADMIN — DEXAMETHASONE SODIUM PHOSPHATE 4 MG: 4 INJECTION, SOLUTION INTRAMUSCULAR; INTRAVENOUS at 21:41

## 2019-03-03 RX ADMIN — GABAPENTIN 300 MG: 300 CAPSULE ORAL at 09:35

## 2019-03-03 RX ADMIN — CARBIDOPA AND LEVODOPA 3 TABLET: 25; 100 TABLET ORAL at 09:34

## 2019-03-03 RX ADMIN — VORTIOXETINE 10 MG: 10 TABLET, FILM COATED ORAL at 09:35

## 2019-03-03 RX ADMIN — DEXAMETHASONE SODIUM PHOSPHATE 6 MG: 4 INJECTION, SOLUTION INTRAMUSCULAR; INTRAVENOUS at 15:00

## 2019-03-03 RX ADMIN — MONTELUKAST SODIUM 10 MG: 10 TABLET, FILM COATED ORAL at 21:40

## 2019-03-03 RX ADMIN — TRAZODONE HYDROCHLORIDE 25 MG: 50 TABLET ORAL at 21:41

## 2019-03-03 RX ADMIN — ASPIRIN 81 MG: 81 TABLET, COATED ORAL at 09:34

## 2019-03-03 RX ADMIN — LINAGLIPTIN 5 MG: 5 TABLET, FILM COATED ORAL at 09:33

## 2019-03-03 RX ADMIN — OXYCODONE HYDROCHLORIDE 5 MG: 5 TABLET ORAL at 21:44

## 2019-03-03 RX ADMIN — ATORVASTATIN CALCIUM 40 MG: 40 TABLET, FILM COATED ORAL at 21:41

## 2019-03-03 RX ADMIN — ALBUTEROL SULFATE 2.5 MG: 2.5 SOLUTION RESPIRATORY (INHALATION) at 20:05

## 2019-03-03 RX ADMIN — DOCUSATE SODIUM 100 MG: 100 CAPSULE, LIQUID FILLED ORAL at 21:40

## 2019-03-03 RX ADMIN — INSULIN HUMAN 15 UNITS: 100 INJECTION, SOLUTION PARENTERAL at 21:44

## 2019-03-03 RX ADMIN — CYCLOBENZAPRINE HYDROCHLORIDE 10 MG: 10 TABLET, FILM COATED ORAL at 18:44

## 2019-03-03 RX ADMIN — OXYCODONE HYDROCHLORIDE 5 MG: 5 TABLET ORAL at 13:13

## 2019-03-03 ASSESSMENT — PAIN DESCRIPTION - ORIENTATION
ORIENTATION: RIGHT;UPPER
ORIENTATION: RIGHT

## 2019-03-03 ASSESSMENT — PAIN DESCRIPTION - FREQUENCY
FREQUENCY: CONTINUOUS
FREQUENCY: CONTINUOUS

## 2019-03-03 ASSESSMENT — PAIN DESCRIPTION - PROGRESSION: CLINICAL_PROGRESSION: GRADUALLY WORSENING

## 2019-03-03 ASSESSMENT — PAIN SCALES - GENERAL
PAINLEVEL_OUTOF10: 6
PAINLEVEL_OUTOF10: 6
PAINLEVEL_OUTOF10: 9
PAINLEVEL_OUTOF10: 8
PAINLEVEL_OUTOF10: 6

## 2019-03-03 ASSESSMENT — PAIN DESCRIPTION - DESCRIPTORS: DESCRIPTORS: ACHING

## 2019-03-03 ASSESSMENT — PAIN DESCRIPTION - LOCATION
LOCATION: SHOULDER
LOCATION: SHOULDER

## 2019-03-03 ASSESSMENT — PAIN DESCRIPTION - PAIN TYPE
TYPE: SURGICAL PAIN
TYPE: SURGICAL PAIN

## 2019-03-03 ASSESSMENT — PAIN DESCRIPTION - ONSET: ONSET: ON-GOING

## 2019-03-03 ASSESSMENT — PAIN - FUNCTIONAL ASSESSMENT: PAIN_FUNCTIONAL_ASSESSMENT: PREVENTS OR INTERFERES SOME ACTIVE ACTIVITIES AND ADLS

## 2019-03-04 ENCOUNTER — TELEPHONE (OUTPATIENT)
Dept: ORTHOPEDIC SURGERY | Age: 64
End: 2019-03-04

## 2019-03-04 VITALS
SYSTOLIC BLOOD PRESSURE: 147 MMHG | RESPIRATION RATE: 16 BRPM | HEIGHT: 61 IN | BODY MASS INDEX: 25.49 KG/M2 | HEART RATE: 82 BPM | OXYGEN SATURATION: 96 % | WEIGHT: 135 LBS | TEMPERATURE: 97.9 F | DIASTOLIC BLOOD PRESSURE: 66 MMHG

## 2019-03-04 LAB
GLUCOSE BLD-MCNC: 331 MG/DL (ref 70–99)
GLUCOSE BLD-MCNC: 401 MG/DL (ref 70–99)
PERFORMED ON: ABNORMAL
PERFORMED ON: ABNORMAL

## 2019-03-04 PROCEDURE — 2580000003 HC RX 258: Performed by: ORTHOPAEDIC SURGERY

## 2019-03-04 PROCEDURE — 94150 VITAL CAPACITY TEST: CPT

## 2019-03-04 PROCEDURE — 94761 N-INVAS EAR/PLS OXIMETRY MLT: CPT

## 2019-03-04 PROCEDURE — 6360000002 HC RX W HCPCS: Performed by: FAMILY MEDICINE

## 2019-03-04 PROCEDURE — 94664 DEMO&/EVAL PT USE INHALER: CPT

## 2019-03-04 PROCEDURE — 6370000000 HC RX 637 (ALT 250 FOR IP): Performed by: FAMILY MEDICINE

## 2019-03-04 PROCEDURE — 97535 SELF CARE MNGMENT TRAINING: CPT

## 2019-03-04 PROCEDURE — 2700000000 HC OXYGEN THERAPY PER DAY

## 2019-03-04 PROCEDURE — 97530 THERAPEUTIC ACTIVITIES: CPT

## 2019-03-04 PROCEDURE — 94640 AIRWAY INHALATION TREATMENT: CPT

## 2019-03-04 PROCEDURE — 6370000000 HC RX 637 (ALT 250 FOR IP): Performed by: ORTHOPAEDIC SURGERY

## 2019-03-04 RX ADMIN — FAMOTIDINE 20 MG: 20 TABLET ORAL at 09:05

## 2019-03-04 RX ADMIN — INSULIN HUMAN 15 UNITS: 100 INJECTION, SOLUTION PARENTERAL at 11:48

## 2019-03-04 RX ADMIN — VORTIOXETINE 10 MG: 10 TABLET, FILM COATED ORAL at 09:03

## 2019-03-04 RX ADMIN — ALBUTEROL SULFATE 2.5 MG: 2.5 SOLUTION RESPIRATORY (INHALATION) at 07:46

## 2019-03-04 RX ADMIN — INSULIN HUMAN 10 UNITS: 100 INJECTION, SOLUTION PARENTERAL at 09:06

## 2019-03-04 RX ADMIN — LEVOTHYROXINE SODIUM 100 MCG: 100 TABLET ORAL at 09:04

## 2019-03-04 RX ADMIN — ASPIRIN 81 MG: 81 TABLET, COATED ORAL at 09:04

## 2019-03-04 RX ADMIN — BUDESONIDE 250 MCG: 0.25 SUSPENSION RESPIRATORY (INHALATION) at 07:46

## 2019-03-04 RX ADMIN — GABAPENTIN 300 MG: 300 CAPSULE ORAL at 09:05

## 2019-03-04 RX ADMIN — LURASIDONE HYDROCHLORIDE 60 MG: 40 TABLET, FILM COATED ORAL at 09:03

## 2019-03-04 RX ADMIN — BENZTROPINE MESYLATE 0.5 MG: 1 TABLET ORAL at 09:04

## 2019-03-04 RX ADMIN — CYCLOBENZAPRINE HYDROCHLORIDE 10 MG: 10 TABLET, FILM COATED ORAL at 07:28

## 2019-03-04 RX ADMIN — Medication 10 ML: at 09:05

## 2019-03-04 RX ADMIN — AMLODIPINE BESYLATE 10 MG: 10 TABLET ORAL at 09:05

## 2019-03-04 RX ADMIN — CARBIDOPA AND LEVODOPA 3 TABLET: 25; 100 TABLET ORAL at 09:04

## 2019-03-04 RX ADMIN — METOPROLOL TARTRATE 25 MG: 25 TABLET ORAL at 09:05

## 2019-03-04 RX ADMIN — LINAGLIPTIN 5 MG: 5 TABLET, FILM COATED ORAL at 09:05

## 2019-03-04 RX ADMIN — FORMOTEROL FUMARATE DIHYDRATE 20 MCG: 20 SOLUTION RESPIRATORY (INHALATION) at 07:46

## 2019-03-04 RX ADMIN — ALBUTEROL SULFATE 2.5 MG: 2.5 SOLUTION RESPIRATORY (INHALATION) at 11:44

## 2019-03-04 RX ADMIN — OXYCODONE HYDROCHLORIDE 5 MG: 5 TABLET ORAL at 07:28

## 2019-03-04 ASSESSMENT — PAIN DESCRIPTION - PROGRESSION: CLINICAL_PROGRESSION: NOT CHANGED

## 2019-03-04 ASSESSMENT — PAIN DESCRIPTION - ONSET: ONSET: ON-GOING

## 2019-03-04 ASSESSMENT — PAIN DESCRIPTION - DESCRIPTORS: DESCRIPTORS: ACHING

## 2019-03-04 ASSESSMENT — PAIN DESCRIPTION - FREQUENCY: FREQUENCY: CONTINUOUS

## 2019-03-04 ASSESSMENT — PAIN SCALES - GENERAL
PAINLEVEL_OUTOF10: 6
PAINLEVEL_OUTOF10: 0

## 2019-03-04 ASSESSMENT — PAIN DESCRIPTION - LOCATION: LOCATION: SHOULDER

## 2019-03-04 ASSESSMENT — PAIN DESCRIPTION - PAIN TYPE: TYPE: SURGICAL PAIN

## 2019-03-04 ASSESSMENT — PAIN DESCRIPTION - ORIENTATION: ORIENTATION: RIGHT;UPPER

## 2019-03-06 ENCOUNTER — OFFICE VISIT (OUTPATIENT)
Dept: ORTHOPEDIC SURGERY | Age: 64
End: 2019-03-06

## 2019-03-06 VITALS
HEART RATE: 94 BPM | WEIGHT: 134.92 LBS | DIASTOLIC BLOOD PRESSURE: 51 MMHG | HEIGHT: 61 IN | SYSTOLIC BLOOD PRESSURE: 123 MMHG | BODY MASS INDEX: 25.47 KG/M2

## 2019-03-06 DIAGNOSIS — Z98.890 S/P ORIF (OPEN REDUCTION INTERNAL FIXATION) FRACTURE: ICD-10-CM

## 2019-03-06 DIAGNOSIS — Z96.611 STATUS POST REVERSE TOTAL REPLACEMENT OF RIGHT SHOULDER: Primary | ICD-10-CM

## 2019-03-06 DIAGNOSIS — Z87.81 S/P ORIF (OPEN REDUCTION INTERNAL FIXATION) FRACTURE: ICD-10-CM

## 2019-03-06 DIAGNOSIS — S42.241P: ICD-10-CM

## 2019-03-06 PROCEDURE — 99024 POSTOP FOLLOW-UP VISIT: CPT | Performed by: ORTHOPAEDIC SURGERY

## 2019-03-12 ENCOUNTER — TELEPHONE (OUTPATIENT)
Dept: ORTHOPEDIC SURGERY | Age: 64
End: 2019-03-12

## 2019-03-13 DIAGNOSIS — G89.18 POST-OP PAIN: Primary | ICD-10-CM

## 2019-03-13 DIAGNOSIS — Z96.611 S/P REVERSE TOTAL SHOULDER ARTHROPLASTY, RIGHT: ICD-10-CM

## 2019-03-13 RX ORDER — OXYCODONE HYDROCHLORIDE AND ACETAMINOPHEN 5; 325 MG/1; MG/1
1 TABLET ORAL EVERY 6 HOURS PRN
Qty: 28 TABLET | Refills: 0 | Status: SHIPPED | OUTPATIENT
Start: 2019-03-13 | End: 2019-03-20

## 2019-03-18 ENCOUNTER — OFFICE VISIT (OUTPATIENT)
Dept: ORTHOPEDIC SURGERY | Age: 64
End: 2019-03-18

## 2019-03-18 VITALS
SYSTOLIC BLOOD PRESSURE: 145 MMHG | DIASTOLIC BLOOD PRESSURE: 68 MMHG | HEIGHT: 61 IN | HEART RATE: 81 BPM | WEIGHT: 134.92 LBS | BODY MASS INDEX: 25.47 KG/M2

## 2019-03-18 DIAGNOSIS — Z96.611 S/P REVERSE TOTAL SHOULDER ARTHROPLASTY, RIGHT: Primary | ICD-10-CM

## 2019-03-18 PROCEDURE — 99024 POSTOP FOLLOW-UP VISIT: CPT | Performed by: PHYSICIAN ASSISTANT

## 2019-03-18 NOTE — LETTER
Physical Therapy Rehabilitation Referral    Patient Name:  Jose Jones      YOB: 1955    Diagnosis:    1. S/P reverse total shoulder arthroplasty, right        Precautions:     [x] Evaluate and Treat    Post Op Instructions:  [] Continuous passive motion (CPM) [] Elbow ROM  [x] Exercise in plane of scapula  []  Strengthening     [] Pulley and instruction   [x] Home exercise program (copy to patient)   [x] Sling when arm at risk  [] Sling or brace at all times   [x] AAROM: Forward elevation to  140            [x] AAROM: External rotation  To  40    [] Isometric external rotator strengthening [] AAROM: internal rotation: up the back  [x] Isometric abductor strengthening  [] AAROM: Internal abduction   [] Isometric internal rotator strengthening [] AAROM: cross-body adduction             Stretching:     Strengthening:  [] Four quadrant (FE, ER, IR, CBA)  [] Rotator cuff (ER, IR, Abd)  [] Forward Elevation    [] External Rotators     [] External Rotation    [] Internal Rotators  [] Internal Rotation: up/back   [] Abductors     [] Internal Rotation: supine in abduction  [] Sleeper Stretch    [] Flexors  [] Cross-body abduction    [] Extensors  [] Pendulum (FE, Abd/Add, cw/ccw)  [x] Scapular Stabilizers   [] Wall-walking (FE, Abd)        [x] Shoulder shrugs     [] Table slides (FE)                [x] Rhomboid pinch  [] Elbow (flex, ext, pron, sup)        [] Lat.  Pull downs     [] Medial epicondylitis program       [] Forward punch   [] Lateral epicondylitis program       [] Internal rotators     [] Progressive resistive exercises  [] Bench Press        [] Bench press plus  Activities:     [] Lateral pull-downs  [] Rowing     [] Progressive two-hand supine press  [] Stepper/Exercise bike   [] Biceps: curls/supination  [] Swimming  [] Water exercises    Modalities:     Return to Sport:  [x] Of Choice      [] Plyometrics  [] Ultrasound     [] Rhythmic stabilization [] Iontophoresis    [] Core strengthening   [] Moist heat     [] Sports specific program:   [] Massage         [x] Cryotherapy      [] Electrical stimulation     [] Paraffin  [] Whirlpool  [] TENS    [x] Home exercise program (copy to patient).         Perform exercises for:   15     minutes    3      times/day  [x] Supervised physical therapy  Frequency: []  1x week  [x] 2x week  [] 3x week  [] Other:   Duration: [] 2 weeks   [] 4 weeks  [x] 6 weeks  [] Other:     Additional Instructions:              Anne Marie Gutierrez PA-C

## 2019-03-28 ENCOUNTER — TELEPHONE (OUTPATIENT)
Dept: ORTHOPEDIC SURGERY | Age: 64
End: 2019-03-28

## 2019-04-01 ENCOUNTER — HOSPITAL ENCOUNTER (OUTPATIENT)
Dept: PHYSICAL THERAPY | Age: 64
Setting detail: THERAPIES SERIES
Discharge: HOME | End: 2019-04-01
Payer: COMMERCIAL

## 2019-04-01 PROCEDURE — 97110 THERAPEUTIC EXERCISES: CPT

## 2019-04-01 PROCEDURE — 97161 PT EVAL LOW COMPLEX 20 MIN: CPT

## 2019-04-01 PROCEDURE — 97530 THERAPEUTIC ACTIVITIES: CPT

## 2019-04-01 NOTE — PLAN OF CARE
[place], [facility], [address]  Phone: [phone]   Fax: [phone]                                                     Physical Therapy Certification    Dear Referring Practitioner: Dr. Nicolle Herrera,    We had the pleasure of evaluating the following patient for physical therapy services at [address] [place] [address]. A summary of our findings can be found in the initial assessment below. This includes our plan of care. If you have any questions or concerns regarding these findings, please do not hesitate to contact me at the office phone number checked above. Thank you for the referral.       Physician Signature:_______________________________Date:__________________  By signing above (or electronic signature), therapists plan is approved by physician      Patient: Nasrin Watson   : 1955   MRN: [id number]  Referring Physician: Referring Practitioner: Dr. Nicolle Herrera      Evaluation Date: 2019      Medical Diagnosis Information:  Diagnosis: F64.675 s/P Reverse TSA (R)   Treatment Diagnosis: M25.511 Pain in (R) Shoulder, decreased ROM, strength                                         Insurance information: PT Insurance Information: [facility] Medicare    Precautions/ Contra-indications: HTN, Fibromyalgia, Depression, Anxiety, Parkinson's Disease, MI, CAD, Type II Diabetes (on insulin),   Latex Allergy:  ?NO      ? YES  Preferred Language for Healthcare:   ?English       ? other:    SUBJECTIVE: Patient stated complaint: originally injuring her (R) shoulder on 10/07/2018 associated with a fall. Pt had a fracture and had an ORIF performed by Dr. Nicolle Herrera. Pt notes a couple of weeks later, an X-ray revealed collapse of the humeral head. Pt then underwent a (R) reverse TSA on 3/1/19 performed by Dr. Nicolle Herrera. Pt was last seen on 3/8/19 and follows up again with M.D. On .   Pt is currently in an [address] [place] [address] with abduction pillow. Pt has been instructed that she can be without the brace when at home. Pt states that her pain is improving. Pt localizes much of the pain to the anterior upper arm and shoulder. Pt states she does catch herself reaching with her (R) UE, even being in the sling. Pt states she is in a lot of pain as she has misplaced the prescribed pain pilld by her pain management M.D. Pt states she has had home physical therapy since the surgery. Pt states she has been doing pendulums as well as leg exercises. Pt  States she has not had any recent fever > 100 deg or other chills, sweats. Pt reports she has decreased sensation along the lateral portion of her (R) shoulder but notes numbness along the anterior upper arm. Relevant Medical History: hx of MI, CHF, Type II Diabetes (on insulin), OA, HTN, Fibromyalgia, Depression, Anxiety, Parkinson's Disease  Functional Disability Index:PT G-Codes  Functional Assessment Tool Used: Quick Dash  Score: 61% LOF    Pain Scale: 8/10 currently and at worst  Easing factors:  rest  Provocative factors:  Reaching up for object,s    Type: ?Constant   ? Intermittent  ? Radiating ? Localized ? other:     Numbness/Tingling: see subjective    Occupation/School:  retired  Living Status/Prior Level of Function: Prior to this injury / incident, pt was independent with ADLs and IADLs,, normal housework      OBJECTIVE:   Hand dominance:  (R)    Palpation: TTP along incision    Functional Mobility/Transfers: modified independent    Posture:   In ultrasling    Bandages/Dressings/Incisions: normal incision/scar appearance, no current s/s of infection, no bleeding      Dermatomes Normal Abnormal Comments   Top of head (C1) y     Posterior occipital region (C2) y     Side of neck (C3) y     Top of shoulder (C4) y     Lateral deltoid (C5) y     Tip of thumb (C6) y     Distal middle finger (C7) y     Distal fifth finger (C8) y     Medial forearm (T1) y             Shoulder ROM testing deferred at this time   PROM AROM    L R L R   Cervical Flexion    [facility]    Cervical Extension    [facility]    Cervical Rotation   [facility] WFL   Cervical Side-bend   [facility] WFL   Elbow Flexion  [facility] WFL   Elbow Extension  [facility] WF     Strength testing deferred  Strength (0-5) Left Right    Shoulder Flex     Shoulder Abd     Shoulder ER     Shoulder IR     Biceps     Triceps     Lats     Middle Trap     Rhomboids     Lower Trap      Pronation      Supination      Wrist Flex     Wrist Ext     Wrist Radial Deviation     Wrist Ulnar Deviation                    Joint mobility:  N/A   ? Normal    ?Hypo   ? Hyper    Orthopedic Special Tests: N/A this date                                    ? Patient history, allergies, meds reviewed. Medical chart reviewed. See intake form. Review Of Systems (ROS):  ?Performed Review of systems (Integumentary, CardioPulmonary, Neurological) by intake and observation. Intake form has been scanned into medical record. Patient has been instructed to contact their primary care physician regarding ROS issues if not already being addressed at this time. Co-morbidities/Complexities (which will affect course of rehabilitation):   ? None           Arthritic conditions   ? Rheumatoid arthritis (M05.9)  ? Osteoarthritis (M19.91)   Cardiovascular conditions   xHypertension (I10)  ? Hyperlipidemia (E78.5)  ? Angina pectoris (I20)  ? Atherosclerosis (I70)   Musculoskeletal conditions   ? Disc pathology   ? Congenital spine pathologies   ? Prior surgical intervention  ? Osteoporosis (M81.8)  ? Osteopenia (M85.8)   Endocrine conditions   ? Hypothyroid (E03.9)  ? Hyperthyroid Gastrointestinal conditions   ? Constipation (O50.67)   Metabolic conditions   ? Morbid obesity (E66.01)  xDiabetes type 1(E10.65) or 2 (E11.65)   ? Neuropathy (G60.9)     Pulmonary conditions   ? Asthma (J45)  ? Coughing   xCOPD (J44.9)   Psychological Disorders  xAnxiety (F41.9)  xDepression (F32.9)   ? Other:   xOther:  Hx of parkinson's, hx of MI, Fibromyalgia       Barriers to/and or personal factors that will affect rehab potential:              ?Age  ? Sex    ? Smoker              ? Motivation/Lack of Motivation                        xCo-Morbidities              ? Cognitive Function, education/learning barriers              ? Environmental, home barriers              ? profession/work barriers  ? past PT/medical experience  ? other:  Justification:      Falls Risk Assessment (30 days):   ? Falls Risk assessed and no intervention required. ? Falls Risk assessed and Patient requires intervention due to being higher risk   TUG score (>12s at risk):     ? Falls education provided, including     Functional Scale:  PT G-Codes  Functional Assessment Tool Used: Quick Dash  Score: 61% LOF    ASSESSMENT:   Functional Impairments   ? Noted spinal or UE joint hypomobility   ? Noted spinal or UE joint hypermobility   xDecreased UE functional ROM   ? Abnormal reflexes/sensation/myotomal/dermatomal deficits   xDecreased RC/scapular/core strength and neuromuscular control   ?other:      Functional Activity Limitations (from functional questionnaire and intake)   xReduced ability to tolerate prolonged functional positions   xReduced ability or difficulty with changes of positions or transfers between positions   xReduced ability to maintain good posture and demonstrate good body mechanics with sitting, bending, and lifting   x Reduced ability or tolerance with driving and/or computer work   xReduced ability to sleep   xReduced ability to perform lifting, reaching, carrying tasks   xReduced ability to tolerate impact through UE   xReduced ability to reach behind back   xReduced ability to  or hold objects   xReduced ability to throw or toss an object   ? other:    Participation Restrictions   xReduced participation in self care activities   xReduced participation in home management activities   Reduced participation in work activities   xReduced participation in social activities. xReduced participation in [id number] activities. Classification:   xSigns/symptoms consistent with post-surgical status including decreased ROM, strength and function. ? Signs/symptoms consistent with joint sprain/strain   ? Signs/symptoms consistent with shoulder impingement   ? Signs/symptoms consistent with shoulder/elbow/wrist tendinopathy   ? Signs/symptoms consistent with Rotator cuff tear   ? Signs/symptoms consistent with labral tear   ? Signs/symptoms consistent with postural dysfunction    ? Signs/symptoms consistent with Glenohumeral IR Deficit - <45 degrees   ? Signs/symptoms consistent with facet dysfunction of cervical/thoracic spine    ? Signs/symptoms consistent with pathology which may benefit from Dry needling     ?other:     Prognosis/Rehab Potential:      ?Excellent   ? Good    xFair   ? Poor    Tolerance of evaluation/treatment:    ?Excellent   xGood    ? Fair   ? Poor    Physical Therapy Evaluation Complexity Justification  ? A history of present problem with:  ? no personal factors and/or comorbidities that impact the plan of care;  ?1-2 personal factors and/or comorbidities that impact the plan of care  x3 personal factors and/or comorbidities that impact the plan of care  ? An examination of body systems using standardized tests and measures addressing any of the following: body structures and functions (impairments), activity limitations, and/or participation restrictions;:  ? a total of 1-2 or more elements   ? a total of 3 or more elements   x a total of 4 or more elements   ? A clinical presentation with:  x stable and/or uncomplicated characteristics   ? evolving clinical presentation with changing characteristics  ? unstable and unpredictable characteristics;   ? Clinical decision making of x low, ? moderate, ? high complexity using standardized patient assessment instrument and/or measurable assessment of functional outcome.     x EVAL (LOW) 15978 (typically 15 minutes face-to-face)  ? EVAL (MOD) 81266 (typically 30 minutes face-to-face)  ? EVAL (HIGH) 24859 (typically 45 minutes face-to-face)  ? RE-EVAL     PLAN:  Frequency/Duration:  2 days per week for 12 Weeks:  INTERVENTIONS:  ? Therapeutic exercise including: strength training, ROM, for Upper extremity and core   ? NMR activation and proprioception for UE, scap and Core   ? Manual therapy as indicated for shoulder, scapula and spine to include: Dry Needling/IASTM, STM, PROM, Gr I-IV mobilizations, manipulation. ? Modalities as needed that may include: thermal agents, E-stim, Biofeedback, US, iontophoresis as indicated  ? Patient education on joint protection, postural re-education, activity modification, progression of HEP. HEP instruction: (see scanned forms)    GOALS:  Patient stated goal: daily use of arm    Therapist goals for Patient:   Short Term Goals: To be achieved in: 2 weeks  1. Independent in HEP and progression per patient tolerance, in order to prevent re-injury. 2. Patient will have a decrease in pain to facilitate improvement in movement, function, and ADLs as indicated by Functional Deficits. Long Term Goals: To be achieved in: 12 weeks  1. Disability index score of 30% or less for the UEFI or Quick DASH to assist with reaching prior level of function. 2. Patient will demonstrate increased AROM to 135 deg or > (R) flex/abd to allow for proper joint functioning as indicated by patients Functional Deficits. 3. Patient will demonstrate an increase in Strength to 4/5 or > (R) IR, and flex to allow for proper functional mobility as indicated by patients Functional Deficits. 4. Patient will return to functional activities including light to moderate housework activities without increased symptoms or restriction.         Electronically signed by:  Antonietta Jovel PT

## 2019-04-01 NOTE — FLOWSHEET NOTE
EXR  X  (31171) Provided verbal/tactile cueing for activities related to strengthening, flexibility, endurance, ROM  for improvements in scapular, scapulothoracic and UE control with self care, reaching, carrying, lifting, house/yardwork, driving/computer work. ? (96216) Provided verbal/tactile cueing for activities related to improving balance, coordination, kinesthetic sense, posture, motor skill, proprioception  to assist with  scapular, scapulothoracic and UE control with self care, reaching, carrying, lifting, house/yardwork, driving/computer work. Therapeutic Activities:    X  (91165 or 24667) Provided verbal/tactile cueing for activities related to improving balance, coordination, kinesthetic sense, posture, motor skill, proprioception and motor activation to allow for proper function of scapular, scapulothoracic and UE control with self care, carrying, lifting, driving/computer work. Home Exercise Program:    ? (51118) Reviewed/Progressed HEP activities related to strengthening, flexibility, endurance, ROM of scapular, scapulothoracic and UE control with self care, reaching, carrying, lifting, house/yardwork, driving/computer work  ? (39557) Reviewed/Progressed HEP activities related to improving balance, coordination, kinesthetic sense, posture, motor skill, proprioception of scapular, scapulothoracic and UE control with self care, reaching, carrying, lifting, house/yardwork, driving/computer work      Manual Treatments:  PROM / STM / Oscillations-Mobs:  G-I, II, III, IV (PA's, Inf., Post.)  ?  (14766) Provided manual therapy to mobilize soft tissue/joints of cervical/CT, scapular GHJ and UE for the purpose of modulating pain, promoting relaxation,  increasing ROM, reducing/eliminating soft tissue swelling/inflammation/restriction, improving soft tissue extensibility and allowing for proper ROM for normal function with self care, reaching, carrying, lifting, house/yardwork, driving/computer tolerated treatment well ? Patient limited by fatique  X  Patient limited by pain  ? Patient limited by other medical complications  ? Other:     Prognosis: ? Good X Fair  ? Poor    Patient Requires Follow-up: ? Yes  ? No    Return to Play:    ?  N/A     ? Stage 1: Intro to Strength   ? Stage 2: Dynamic Strength and Intro to Plyometrics   ? Stage 3: Advanced Plyometrics and Intro to Throwing   ? Stage 4: Sport specific Training/Return to Sport     ? Ready to Return to Play, Meets All Above Stages   ? Not Ready for Return to Sports   Comments:      PLAN: See eval  ? Continue per plan of care ? Alter current plan (see comments)  ? Plan of care initiated ? Hold pending MD visit ?  Discharge    Electronically signed by: Lainey Harrison PT, DPT

## 2019-04-03 ENCOUNTER — HOSPITAL ENCOUNTER (OUTPATIENT)
Dept: PHYSICAL THERAPY | Age: 64
Setting detail: THERAPIES SERIES
Discharge: HOME | End: 2019-04-03
Payer: COMMERCIAL

## 2019-04-03 NOTE — FLOWSHEET NOTE
[address] [facility]    Physical Therapy  Cancellation/No-show Note  Patient Name:  Jarrett Payne  :  1955   Date:  4/3/2019  Cancelled visits to date: 0  No-shows to date: 1    For today's appointment patient:  []  Cancelled  []  Rescheduled appointment  [x]  No-show     Reason given by patient:  []  Patient ill  []  Conflicting appointment  []  No transportation    []  Conflict with work  []  No reason given  [x]  Other:  Pt showed up an hour after scheduled appointment time due to confusion of scheduled time.     Comments:      Electronically signed by:  Linsye Juarez PTA

## 2019-04-05 ENCOUNTER — HOSPITAL ENCOUNTER (OUTPATIENT)
Dept: PHYSICAL THERAPY | Age: 64
Setting detail: THERAPIES SERIES
Discharge: HOME | End: 2019-04-05
Payer: COMMERCIAL

## 2019-04-05 PROCEDURE — 97140 MANUAL THERAPY 1/> REGIONS: CPT

## 2019-04-05 PROCEDURE — 97110 THERAPEUTIC EXERCISES: CPT

## 2019-04-05 NOTE — FLOWSHEET NOTE
[address] [facility]    Physical Therapy Daily Treatment Note  Date:  2019    Patient Name:  Rafael Tinsley    :  1955  MRN: [id number]  Medical/Treatment Diagnosis Information:  Diagnosis: B25.618 s/P Reverse TSA (R)  Treatment Diagnosis: M25.511 Pain in (R) Shoulder, decreased ROM, strength  Insurance/Certification information:  PT Insurance Information: [employer] Medicare  Physician Information:  Referring Practitioner: Dr. Demario Young of care signed (Y/N):     Date of Patient follow up with Physician:     Functional score:      Date functional scale completed    PT G-Codes  Functional Assessment Tool Used: Quick Dash  Score: 61% LOF    Progress Note: ?  Yes  ? No  Next due by: Visit #10      Latex Allergy:  ?NO      ? YES  Preferred Language for Healthcare:   ?English       ? other:    Visit # Insurance Allowable Date Range   2 Eval + 30 up to $2010 N/A     Pain level: 8/10     SUBJECTIVE:  Reports shoulder is very stiff and sore today. HEP is going well but is having trouble with 1 of the exercises but can't remember which one. Has trouble dressing due to pain and stiffness in R shoulder. Pt reports shoulder feels much better after session with significantly less pain than prior to start of session. OBJECTIVE: See eval   Observation: : Pt with SOB with walking to back of clinic this date for session.     Test measurements:      RESTRICTIONS/PRECAUTIONS: HTN, Fibromyalgia, Depression, Anxiety, Parkinson's Disease, MI, CAD, Type II Diabetes (on insulin),  PROTOCOL IN MEDIA       SURGERY DATE: 3/1/19    Exercises/Interventions:   Therapeutic Exercise  Resistance / level Sets/sec Reps Notes          Scap retraction  3\" 10 HEP   Pendulums   X 20 HEP   PROM elbow flex/ext   x20 ea way HEP   ER with wand/AAROM Limited to 40 or per tolerance if before 40 deg  x10 HEP   Supine AAROM flex Current limit is 140 deg(pt not yet to this point)  x10 mobilize soft tissue/joints of cervical/CT, scapular GHJ and UE for the purpose of modulating pain, promoting relaxation,  increasing ROM, reducing/eliminating soft tissue swelling/inflammation/restriction, improving soft tissue extensibility and allowing for proper ROM for normal function with self care, reaching, carrying, lifting, house/yardwork, driving/computer work    Modalities: declined      Charges:  Timed Code Treatment Minutes: 40   Total Treatment Minutes: 45        EVAL - LOW (82828)   ? EVAL - MOD (13442)  ? EVAL - HIGH (03673)  ? RE-EVAL (39140)  X SZ(34018) x 2    ? IONTO  ? NMR (99752) x     ? VASO  X Manual (99752) x 1   ? Other:  ? TA x      ? Mech Traction (50176)  ? ES(attended) (72288)     ? ES (un) (59546):     GOALS:  Patient stated goal: daily use of arm    Therapist goals for Patient:   Short Term Goals: To be achieved in: 2 weeks  1. Independent in HEP and progression per patient tolerance, in order to prevent re-injury. 2. Patient will have a decrease in pain to facilitate improvement in movement, function, and ADLs as indicated by Functional Deficits. Long Term Goals: To be achieved in: 12 weeks  1. Disability index score of 30% or less for the UEFI or Quick DASH to assist with reaching prior level of function. 2. Patient will demonstrate increased AROM to 135 deg or > (R) flex/abd to allow for proper joint functioning as indicated by patients Functional Deficits. 3. Patient will demonstrate an increase in Strength to 4/5 or > (R) IR, and flex to allow for proper functional mobility as indicated by patients Functional Deficits. 4. Patient will return to functional activities including light to moderate housework activities without increased symptoms or restriction. Progression Towards Functional goals:  ? Patient is progressing as expected towards functional goals listed. ? Progression is slowed due to complexities listed.   ? Progression has been slowed due to co-morbidities. ? Plan just implemented, too soon to assess goals progression  ? Other:     Persisting Functional Limitations/Impairments:  ?Sitting ? Standing   ? Walking ? Squatting/bending    ? Stairs X ADL's    xTransfers X Reaching  X Housework ? Job related tasks  ? Driving X Sports/Recreation   ? Other:    ASSESSMENT:  Discussed need to work on improving ROM within specified protocol limits for maximum functional mobility. Pt currently limited in exercise routine due to recent surgery, healing and MD protocol for rehab and will continue to benefit from skilled therapy to progress toward improving functional mobility, strength and motion to perform self care and household as well as daily functional tasks without restrictions. Treatment/Activity Tolerance:  ? Patient tolerated treatment well ? Patient limited by fatique  X  Patient limited by pain  ? Patient limited by other medical complications  ? Other:     Prognosis: ? Good X Fair  ? Poor    Patient Requires Follow-up: ? Yes  ? No    Return to Play:    ?  N/A     ? Stage 1: Intro to Strength   ? Stage 2: Dynamic Strength and Intro to Plyometrics   ? Stage 3: Advanced Plyometrics and Intro to Throwing   ? Stage 4: Sport specific Training/Return to Sport     ? Ready to Return to Play, Meets All Above Stages   ? Not Ready for Return to Sports   Comments:      PLAN: See eval  ? Continue per plan of care ? Alter current plan (see comments)  ? Plan of care initiated ? Hold pending MD visit ?  Discharge    Electronically signed by: Armando Serra [id number]

## 2019-04-08 ENCOUNTER — OFFICE VISIT (OUTPATIENT)
Dept: ORTHOPEDIC SURGERY | Age: 64
End: 2019-04-08

## 2019-04-08 VITALS
HEIGHT: 61 IN | BODY MASS INDEX: 25.47 KG/M2 | HEART RATE: 85 BPM | DIASTOLIC BLOOD PRESSURE: 68 MMHG | SYSTOLIC BLOOD PRESSURE: 145 MMHG | WEIGHT: 134.92 LBS

## 2019-04-08 DIAGNOSIS — Z96.611 S/P REVERSE TOTAL SHOULDER ARTHROPLASTY, RIGHT: Primary | ICD-10-CM

## 2019-04-08 PROCEDURE — 99024 POSTOP FOLLOW-UP VISIT: CPT | Performed by: PHYSICIAN ASSISTANT

## 2019-04-08 NOTE — PROGRESS NOTES
History of Present Illness:  Enrrique Baker is a 60 yo female here for follow up of her right shoulder. She underwent a right reverse total shoulder arthroplasty on 3/1/19. She is a little over 5 weeks postop. She has been compliant with her sling and also with her non weight bearing status. She is going to therapy. She denies any new injuries. Medical History:  Patient's medications, allergies, past medical, surgical, social and family histories were reviewed and updated as appropriate. Review of Systems    DONE: 10/16/18    Review of Systems  A 14 point review of systems was completed by the patient and is available in the media section of the scanned medical record and was reviewed on 4/8/2019. The review is negative with the exception of those things mentioned in the HPI and Past Medical History    Vital Signs:  Vitals:    04/08/19 1326   BP: (!) 141/118   Pulse: 85       General/Appearance: Alert and oriented and in no apparent distress. Skin:  There are no skin lesions, cellulitis, or extreme edema. The patient has warm and well-perfused Bilateral upper extremities with brisk capillary refill.      right Shoulder Exam:  Inspection: her shoulder incision is C/D/I. No gross deformities, no signs of infection. Palpation:  She has no crepitus. Active Range of Motion: deferred    Passive Range of Motion:  FE of 90, abduction of 90, external rotation of 25    Strength:  deferred    Special Tests:  No Vargas muscle deformity. Neurovascular: Sensation to light touch is intact, no motor deficits, palpable radial pulses 2+      Radiology:     Plain radiographs of the right shoulder comprising 3 views: AP in and out and axillary lateral were obtained and reviewed in the office: all components of the reverse TSR are in good placement without displacement. No signs of loosening or fractures seen.           Assessment :  Ms. Enrrique Baker is a 61 y.o. yr old patient who underwent a right reverse total shoulder replacement. Surgery date was 3/1/19        Impression:  Encounter Diagnosis   Name Primary?  S/P reverse total shoulder arthroplasty, right Yes       Office Procedures:  No orders of the defined types were placed in this encounter. Treatment Plan: We recommend that she D/C her sling after this coming Friday. We will have her start IR movement in PT. Updated therapy orders were placed in her chart. All her questions were fully answered. 1:46 PM      Cesar Mejia PA-C  Orthopaedic Sports Medicine Physician Assistant     This dictation was performed with a verbal recognition program [facility]) and it was checked for errors. It is possible that there are still dictated errors within this office note. If so, please bring any errors to my attention for an addendum. All efforts were made to ensure that this office note is accurate.

## 2019-04-08 NOTE — LETTER
Physical Therapy Rehabilitation Referral    Patient Name:  Kiana Nice      YOB: 1955    Diagnosis:    1. S/P reverse total shoulder arthroplasty, right        Precautions:  scapularis     [x] Evaluate and Treat    Post Op Instructions:  [] Continuous passive motion (CPM) [] Elbow ROM  [x] Exercise in plane of scapula  []  Strengthening     [] Pulley and instruction   [x] Home exercise program (copy to patient)   [] Sling when arm at risk  [] Sling or brace at all times   [x] AAROM: Forward elevation to  140            [x] AAROM: External rotation  To  40    [] Isometric external rotator strengthening [x] AAROM: internal rotation: up the back at       6 weeks post op  [x] Isometric abductor strengthening  [x] AAROM: Internal abduction   [] Isometric internal rotator strengthening [] AAROM: cross-body adduction             Stretching:     Strengthening:  [] Four quadrant (FE, ER, IR, CBA)  [] Rotator cuff (ER, IR, Abd)  [x] Forward Elevation    [] External Rotators     [x] External Rotation    [] Internal Rotators  [] Internal Rotation: up/back   [] Abductors     [] Internal Rotation: supine in abduction  [] Sleeper Stretch    [] Flexors  [] Cross-body abduction    [] Extensors  [] Pendulum (FE, Abd/Add, cw/ccw)  [x] Scapular Stabilizers   [] Wall-walking (FE, Abd)        [x] Shoulder shrugs     [] Table slides (FE)                [x] Rhomboid pinch  [] Elbow (flex, ext, pron, sup)        [] Lat.  Pull downs     [] Medial epicondylitis program       [] Forward punch   [] Lateral epicondylitis program       [] Internal rotators     [] Progressive resistive exercises  [] Bench Press        [] Bench press plus  Activities:     [] Lateral pull-downs  [] Rowing     [] Progressive two-hand supine press  [] Stepper/Exercise bike   [x] Biceps: curls/supination after 4/15/19   [] Swimming  [] Water exercises    Modalities:     Return to Sport:  [x] Of Choice      [] Plyometrics [] Ultrasound     [] Rhythmic stabilization  [] Iontophoresis    [] Core strengthening   [] Moist heat     [] Sports specific program:   [] Massage         [x] Cryotherapy      [] Electrical stimulation     [] Paraffin  [] Whirlpool  [] TENS    [x] Home exercise program (copy to patient).         Perform exercises for:   15     minutes    3      times/day  [x] Supervised physical therapy  Frequency: []  1x week  [x] 2x week  [] 3x week  [] Other:   Duration: [] 2 weeks   [] 4 weeks  [x] 6 weeks  [] Other:     Additional Instructions:              Lucero Osborne PA-C

## 2019-04-09 ENCOUNTER — HOSPITAL ENCOUNTER (OUTPATIENT)
Dept: PHYSICAL THERAPY | Age: 64
Setting detail: THERAPIES SERIES
Discharge: HOME | End: 2019-04-09
Payer: COMMERCIAL

## 2019-04-09 PROCEDURE — 97140 MANUAL THERAPY 1/> REGIONS: CPT

## 2019-04-09 PROCEDURE — 97110 THERAPEUTIC EXERCISES: CPT

## 2019-04-09 NOTE — FLOWSHEET NOTE
[address] [facility]    Physical Therapy Daily Treatment Note  Date:  2019    Patient Name:  Sachi Enriquez    :  1955  MRN: [id number]  Medical/Treatment Diagnosis Information:  Diagnosis: P72.176 s/P Reverse TSA (R)  Treatment Diagnosis: M25.511 Pain in (R) Shoulder, decreased ROM, strength  Insurance/Certification information:  PT Insurance Information: [employer]  Physician Information:  Referring Practitioner: Dr. Jeremiah Schneider of care signed (Y/N): y    Date of Patient follow up with Physician:     Functional score:      Date functional scale completed    PT G-Codes  Functional Assessment Tool Used: Quick Dash  Score: 61% LOF    Progress Note: ?  Yes  ? No  Next due by: Visit #10      Latex Allergy:  ?NO      ? YES  Preferred Language for Healthcare:   ?English       ? other:    Visit # Insurance Allowable Date Range   3 Eval + 30 up to $2010 N/A     Pain level: 10/10     SUBJECTIVE:   Pt states she is in pain from her neck down to her knees. Pt states she has not had any pain medication due to not being able to find them. Pt states heR  Per PA note, pt to d/c sling completely after this Friday. OBJECTIVE: 19   Observation: : Pt with SOB with walking to back of clinic this date for session.     Test measurements:      RESTRICTIONS/PRECAUTIONS: HTN, Fibromyalgia, Depression, Anxiety, Parkinson's Disease, MI, CAD, Type II Diabetes (on insulin),  PROTOCOL IN MEDIA       SURGERY DATE: 3/1/19    Exercises/Interventions:   Therapeutic Exercise x27' Resistance / level Sets/sec Reps Notes          Scap retraction  3\" 30 HEP cueing to minimize shrugging   Pendulums   X 20 HEP Held   PROM elbow flex/ext   x30 ea way HEP   ER with wand/AAROM Limited to 40 or per tolerance if before 40 deg  x10 HEP cueing for plane of movement for proper ROM   Supine AAROM flex Current limit is 140 deg(pt not yet to this point)  x10 HEP Held Abduction isometrics  10\" 10 Added 4/5   AROM elbow   x3 Attempted 4/9 but stopped due to pain   Table Slides Flex 5\" 10 Added 4/9                 Neuromuscular Re-ed / Therapeutic Activities        x10'                                                Manual Intervention x 13'       Shld /GH Mobs       Post Cap mobs       Thoracic/Rib manipualtion       STM (R) upper trap 3'     PROM MT  10'  Within protocol ROM limits              Therapeutic Exercise and NMR EXR  X  (97065) Provided verbal/tactile cueing for activities related to strengthening, flexibility, endurance, ROM  for improvements in scapular, scapulothoracic and UE control with self care, reaching, carrying, lifting, house/yardwork, driving/computer work. ? (44334) Provided verbal/tactile cueing for activities related to improving balance, coordination, kinesthetic sense, posture, motor skill, proprioception  to assist with  scapular, scapulothoracic and UE control with self care, reaching, carrying, lifting, house/yardwork, driving/computer work. Therapeutic Activities:    X  (89940 or 71497) Provided verbal/tactile cueing for activities related to improving balance, coordination, kinesthetic sense, posture, motor skill, proprioception and motor activation to allow for proper function of scapular, scapulothoracic and UE control with self care, carrying, lifting, driving/computer work.      Home Exercise Program:    ? (89352) Reviewed/Progressed HEP activities related to strengthening, flexibility, endurance, ROM of scapular, scapulothoracic and UE control with self care, reaching, carrying, lifting, house/yardwork, driving/computer work  ? (29508) Reviewed/Progressed HEP activities related to improving balance, coordination, kinesthetic sense, posture, motor skill, proprioception of scapular, scapulothoracic and UE control with self care, reaching, carrying, lifting, house/yardwork, driving/computer work      Manual Treatments:  PROM / STM / Progression is slowed due to complexities listed. ? Progression has been slowed due to co-morbidities. ? Plan just implemented, too soon to assess goals progression  ? Other:     Persisting Functional Limitations/Impairments:  ?Sitting ? Standing   ? Walking ? Squatting/bending    ? Stairs X ADL's    xTransfers X Reaching  X Housework ? Job related tasks  ? Driving X Sports/Recreation   ? Other:    ASSESSMENT:  Pt able to get to 90 deg of (R) shoulder flex passively this date but continues to be limited due to pain. Pt unable to tolerate active ROM of the (R) elbow at this time. Pt is progressing slowly, in part due to other co-morbidities. Encouraged pt to continue performing her HEP regularly to help promote better ROM. Pt will continue to be gradually progressed per updated protocol in order to help improved functional ROm, strength, and to help manage pain. Treatment/Activity Tolerance:  ? Patient tolerated treatment well ? Patient limited by fatique  X  Patient limited by pain  ? Patient limited by other medical complications  ? Other:     Prognosis: ? Good X Fair  ? Poor    Patient Requires Follow-up: ? Yes  ? No    Return to Play:    ?  N/A     ? Stage 1: Intro to Strength   ? Stage 2: Dynamic Strength and Intro to Plyometrics   ? Stage 3: Advanced Plyometrics and Intro to Throwing   ? Stage 4: Sport specific Training/Return to Sport     ? Ready to Return to Play, Meets All Above Stages   ? Not Ready for Return to Sports   Comments:      PLAN: Try active elbow ROM NPV, attempt light resistance with scap retraction, attempt pulleys  x Continue per plan of care ? Alter current plan (see comments)   Plan of care initiated ? Hold pending MD visit ?  Discharge    Electronically signed by: Miguel Bradley PT, DPT

## 2019-04-12 ENCOUNTER — HOSPITAL ENCOUNTER (OUTPATIENT)
Dept: PHYSICAL THERAPY | Age: 64
Setting detail: THERAPIES SERIES
Discharge: HOME | End: 2019-04-12
Payer: COMMERCIAL

## 2019-04-12 PROCEDURE — 97110 THERAPEUTIC EXERCISES: CPT

## 2019-04-12 PROCEDURE — 97140 MANUAL THERAPY 1/> REGIONS: CPT

## 2019-04-12 NOTE — FLOWSHEET NOTE
[address] [facility]    Physical Therapy Daily Treatment Note  Date:  2019    Patient Name:  Kranthi Schuster    :  1955  MRN: [id number]  Medical/Treatment Diagnosis Information:  Diagnosis: F76.230 s/P Reverse TSA (R)  Treatment Diagnosis: M25.511 Pain in (R) Shoulder, decreased ROM, strength  Insurance/Certification information:  PT Insurance Information: [employer] Medicare  Physician Information:  Referring Practitioner: Dr. Rajesh Stephenson of care signed (Y/N): y    Date of Patient follow up with Physician:     Functional score:      Date functional scale completed    PT G-Codes  Functional Assessment Tool Used: Quick Dash  Score: 61% LOF    Progress Note: ?  Yes  ? No  Next due by: Visit #10      Latex Allergy:  ?NO      ? YES  Preferred Language for Healthcare:   ?English       ? other:    Visit # Insurance Allowable Date Range   4 Eval + 30 up to $2010 N/A     Pain level: 8/10     SUBJECTIVE:   Pt is now 6 weeks post op. Pt states she is still in severe pain. Pt saw her pain M.D. And is unhappy that she was not given another prescription to \"wean off\" of Percocet. Pt continues to state she is using the pain patches.       OBJECTIVE: 19   Observation    Test measurements:     PROM:  Flex: 110  ER: 40 deg (limited per protocol)     RESTRICTIONS/PRECAUTIONS: HTN, Fibromyalgia, Depression, Anxiety, Parkinson's Disease, MI, CAD, Type II Diabetes (on insulin),  PROTOCOL IN MEDIA       SURGERY DATE: 3/1/19    Exercises/Interventions:   Therapeutic Exercise x15' Resistance / level Sets/sec Reps Notes          Scap retraction  3\" 30 HEP cueing to minimize shrugging   Pendulums   X 20 HEP    PROM elbow flex/ext   x30 ea way HEP Held   ER with wand/AAROM Limited to 40 or per tolerance if before 40 deg  x10 HEP cueing for plane of movement for proper ROM   Supine AAROM flex Current limit is 140 deg(pt not yet to this point)  x10 HEP Held   Abduction isometrics  10\" 10 Added 4/5   AROM elbow   x3 Attempted 4/9 but stopped due to pain Held   Table Slides Flex 5\" 10 Added 4/9                 Neuromuscular Re-ed / Therapeutic Activities 5'       Pt re-educated on pain management including the use of ice, desensitization techniques, as well as the role of the CNS in chronic pain 5'                                                Manual Intervention x 11'       Shld /GH Mobs       Post Cap mobs       Thoracic/Rib manipualtion       STM (R) upper trap, anterior and posterior shoulder 3'     PROM MT Flex, ER, IR 8'  Within protocol ROM limits              Therapeutic Exercise and NMR EXR  X  (87392) Provided verbal/tactile cueing for activities related to strengthening, flexibility, endurance, ROM  for improvements in scapular, scapulothoracic and UE control with self care, reaching, carrying, lifting, house/yardwork, driving/computer work. ? (20079) Provided verbal/tactile cueing for activities related to improving balance, coordination, kinesthetic sense, posture, motor skill, proprioception  to assist with  scapular, scapulothoracic and UE control with self care, reaching, carrying, lifting, house/yardwork, driving/computer work. Therapeutic Activities:    X  (87466 or 53974) Provided verbal/tactile cueing for activities related to improving balance, coordination, kinesthetic sense, posture, motor skill, proprioception and motor activation to allow for proper function of scapular, scapulothoracic and UE control with self care, carrying, lifting, driving/computer work.      Home Exercise Program:    ? (77077) Reviewed/Progressed HEP activities related to strengthening, flexibility, endurance, ROM of scapular, scapulothoracic and UE control with self care, reaching, carrying, lifting, house/yardwork, driving/computer work  ? (88875) Reviewed/Progressed HEP activities related to improving balance, coordination, kinesthetic sense, posture, motor skill, proprioception of scapular, scapulothoracic and UE control with self care, reaching, carrying, lifting, house/yardwork, driving/computer work      Manual Treatments:  PROM / STM / Oscillations-Mobs:  G-I, II, III, IV (PA's, Inf., Post.)  X (70939) Provided manual therapy to mobilize soft tissue/joints of cervical/CT, scapular GHJ and UE for the purpose of modulating pain, promoting relaxation,  increasing ROM, reducing/eliminating soft tissue swelling/inflammation/restriction, improving soft tissue extensibility and allowing for proper ROM for normal function with self care, reaching, carrying, lifting, house/yardwork, driving/computer work    Modalities: declined      Charges:  Timed Code Treatment Minutes: 31'   Total Treatment Minutes: 35'        EVAL - LOW (60875)   ? EVAL - MOD (69739)  ? EVAL - HIGH (76451)  ? RE-EVAL (00138)  X TA(87588) x 1  ? IONTO  ? NMR (26330) x     ? VASO  X Manual (30021) x 1   ? Other:  ? TA x      ? Mech Traction (91165)  ? ES(attended) (40364)     ? ES (un) (01844):     GOALS:  Patient stated goal: daily use of arm    Therapist goals for Patient:   Short Term Goals: To be achieved in: 2 weeks  1. Independent in HEP and progression per patient tolerance, in order to prevent re-injury. 2. Patient will have a decrease in pain to facilitate improvement in movement, function, and ADLs as indicated by Functional Deficits. Long Term Goals: To be achieved in: 12 weeks  1. Disability index score of 30% or less for the UEFI or Quick DASH to assist with reaching prior level of function. 2. Patient will demonstrate increased AROM to 135 deg or > (R) flex/abd to allow for proper joint functioning as indicated by patients Functional Deficits. 3. Patient will demonstrate an increase in Strength to 4/5 or > (R) IR, and flex to allow for proper functional mobility as indicated by patients Functional Deficits.    4. Patient will return to functional activities including light to moderate housework activities without increased symptoms or restriction. Progression Towards Functional goals:  ? Patient is progressing as expected towards functional goals listed. ? Progression is slowed due to complexities listed. ? Progression has been slowed due to co-morbidities. ? Plan just implemented, too soon to assess goals progression  ? Other:     Persisting Functional Limitations/Impairments:  ?Sitting ? Standing   ? Walking ? Squatting/bending    ? Stairs X ADL's    xTransfers X Reaching  X Housework ? Job related tasks  ? Driving X Sports/Recreation   ? Other:    ASSESSMENT:   Held significant progression this date due to pt's report of increased pain following the previous visit. Pt demonstrated improved (R) shoulder PROM this date compared to previous visit despite reports of increased pain. Pt did demonstrate pain today, but appeared to be able to manage current pain levels. Spent time with pt re-educated pt on expectations as far as pain with the rehabilitation process. Pt does state she is able to do her HEP and will continue to be compliant with it independently. At this time, the pt would continue to benefit from formal therapy to help address current post operative limitations. Treatment/Activity Tolerance:  ? Patient tolerated treatment well ? Patient limited by fatique  X  Patient limited by pain  ? Patient limited by other medical complications  ? Other:     Prognosis: ? Good X Fair  ? Poor    Patient Requires Follow-up: ? Yes  ? No    Return to Play:    ?  N/A     ? Stage 1: Intro to Strength   ? Stage 2: Dynamic Strength and Intro to Plyometrics   ? Stage 3: Advanced Plyometrics and Intro to Throwing   ? Stage 4: Sport specific Training/Return to Sport     ? Ready to Return to Play, Meets All Above Stages   ? Not Ready for Return to Sports   Comments:      PLAN: Try active elbow ROM NPV, attempt light resistance with scap retraction, attempt pulleys  x Continue per plan of care ? Alter current plan (see comments)   Plan of care initiated ? Hold pending MD visit ?  Discharge    Electronically signed by: Kenton Claudio PT, DPT

## 2019-04-16 ENCOUNTER — HOSPITAL ENCOUNTER (OUTPATIENT)
Dept: PHYSICAL THERAPY | Age: 64
Setting detail: THERAPIES SERIES
Discharge: HOME | End: 2019-04-16
Payer: COMMERCIAL

## 2019-04-16 PROCEDURE — 97140 MANUAL THERAPY 1/> REGIONS: CPT

## 2019-04-16 PROCEDURE — 97110 THERAPEUTIC EXERCISES: CPT

## 2019-04-16 NOTE — FLOWSHEET NOTE
[address] [facility] [address]    Physical Therapy Daily Treatment Note  Date:  2019    Patient Name:  Shena Manning    :  1955  MRN: [id number]  Medical/Treatment Diagnosis Information:  Diagnosis: I97.724 s/P Reverse TSA (R)  Treatment Diagnosis: M25.511 Pain in (R) Shoulder, decreased ROM, strength  Insurance/Certification information:  PT Insurance Information: [employer]  Physician Information:  Referring Practitioner: Dr. Flonnie Prader of care signed (Y/N): y    Date of Patient follow up with Physician:     Functional score:      Date functional scale completed    PT G-Codes  Functional Assessment Tool Used: Quick Dash  Score: 61% LOF    Progress Note: ?  Yes  ? No  Next due by: Visit #10      Latex Allergy:  ?NO      ? YES  Preferred Language for Healthcare:   ?English       ? other:    Visit # Insurance Allowable Date Range   4 Eval + 30 up to $2010 N/A     Pain level: 9/10     SUBJECTIVE:   Pt states that her (R) UE pain has been so severe the past couple of days that \"I can hardly move it. \"  Pt states that there was no aggravating movement or known cause. Pt states this pain is in the posterior portion of her upper arm.     OBJECTIVE: 19   Observation    Test measurements:     PROM:  Flex: 114  ER: 40 deg (limited per protocol)     RESTRICTIONS/PRECAUTIONS: HTN, Fibromyalgia, Depression, Anxiety, Parkinson's Disease, MI, CAD, Type II Diabetes (on insulin),  PROTOCOL IN MEDIA       SURGERY DATE: 3/1/19    Exercises/Interventions:   Therapeutic Exercise x27' Resistance / level Sets/sec Reps Notes          Scap retraction  3\" 30 HEP cueing to minimize shrugging   Pendulums   X 20 HEP  Cueing for   PROM elbow flex/ext   x30 ea way HEP Held   ER with wand/AAROM Limited to 40 or per tolerance if before 40 deg  x10 HEP cueing for plane of movement for proper ROM   Supine AAROM flex Current limit is 140 deg(pt not yet to this point)  x10 HEP Held   Abduction isometrics  10\" 10 Added 4/5   AROM elbow  2 10 Attempted 4/9 but stopped due to pain Added back in 4/16   Table Slides Flex 5\" 10 Added 4/9   Pulleys FE 5\" 2 10 Added 4/16          Neuromuscular Re-ed / Therapeutic Activities                                                         Manual Intervention x 14'       Shld /GH Mobs       Post Cap mobs       Thoracic/Rib manipualtion       STM (R) upper trap, anterior and posterior shoulder 3'     PROM MT Flex, ER,  10'  Within protocol ROM limits              Therapeutic Exercise and NMR EXR  X  (21259) Provided verbal/tactile cueing for activities related to strengthening, flexibility, endurance, ROM  for improvements in scapular, scapulothoracic and UE control with self care, reaching, carrying, lifting, house/yardwork, driving/computer work. ? (57219) Provided verbal/tactile cueing for activities related to improving balance, coordination, kinesthetic sense, posture, motor skill, proprioception  to assist with  scapular, scapulothoracic and UE control with self care, reaching, carrying, lifting, house/yardwork, driving/computer work. Therapeutic Activities:    X  (20051 or 44632) Provided verbal/tactile cueing for activities related to improving balance, coordination, kinesthetic sense, posture, motor skill, proprioception and motor activation to allow for proper function of scapular, scapulothoracic and UE control with self care, carrying, lifting, driving/computer work.      Home Exercise Program:    ? (41780) Reviewed/Progressed HEP activities related to strengthening, flexibility, endurance, ROM of scapular, scapulothoracic and UE control with self care, reaching, carrying, lifting, house/yardwork, driving/computer work  ? (57857) Reviewed/Progressed HEP activities related to improving balance, coordination, kinesthetic sense, posture, motor skill, proprioception of scapular, scapulothoracic and UE control with self care, reaching, carrying, lifting, house/yardwork, driving/computer work      Manual Treatments:  PROM / STM / Oscillations-Mobs:  G-I, II, III, IV (PA's, Inf., Post.)  X (93518) Provided manual therapy to mobilize soft tissue/joints of cervical/CT, scapular GHJ and UE for the purpose of modulating pain, promoting relaxation,  increasing ROM, reducing/eliminating soft tissue swelling/inflammation/restriction, improving soft tissue extensibility and allowing for proper ROM for normal function with self care, reaching, carrying, lifting, house/yardwork, driving/computer work    Modalities: declined      Charges:  Timed Code Treatment Minutes: 39'   Total Treatment Minutes: 47'        EVAL - LOW (23711)   ? EVAL - MOD (69064)  ? EVAL - HIGH (55842)  ? RE-EVAL (55332)  X VV(24779) x 2  ? IONTO  ? NMR (91229) x     ? VASO  X Manual (50398) x 1   ? Other:  ? TA x      ? Mech Traction (53055)  ? ES(attended) (42804)     ? ES (un) (34053):     GOALS:  Patient stated goal: daily use of arm    Therapist goals for Patient:   Short Term Goals: To be achieved in: 2 weeks  1. Independent in HEP and progression per patient tolerance, in order to prevent re-injury. 2. Patient will have a decrease in pain to facilitate improvement in movement, function, and ADLs as indicated by Functional Deficits. Long Term Goals: To be achieved in: 12 weeks  1. Disability index score of 30% or less for the UEFI or Quick DASH to assist with reaching prior level of function. 2. Patient will demonstrate increased AROM to 135 deg or > (R) flex/abd to allow for proper joint functioning as indicated by patients Functional Deficits. 3. Patient will demonstrate an increase in Strength to 4/5 or > (R) IR, and flex to allow for proper functional mobility as indicated by patients Functional Deficits. 4. Patient will return to functional activities including light to moderate housework activities without increased symptoms or restriction.      Progression Towards Functional goals:  ? Patient is progressing as expected towards functional goals listed. ? Progression is slowed due to complexities listed. ? Progression has been slowed due to co-morbidities. ? Plan just implemented, too soon to assess goals progression  ? Other:     Persisting Functional Limitations/Impairments:  ?Sitting ? Standing   ? Walking ? Squatting/bending    ? Stairs X ADL's    xTransfers X Reaching  X Housework ? Job related tasks  ? Driving X Sports/Recreation   ? Other:    ASSESSMENT:    Pt reported feeling better following today's session \"like we worked it out. \"  Pt demonstrated mild gains in her shoulder flexion mobility but remains limited by pain the most.  Pt was able to tolerate active elbow ROM an this was added to pt's HEP. Encouraged pt to continually work on her ROM within current restrictions utilizing her HEP to help manage pain. Pt does tend to demonstrate decreased pain with the movement involved during her normal exercises. Treatment/Activity Tolerance:  ? Patient tolerated treatment well ? Patient limited by fatique  X  Patient limited by pain  ? Patient limited by other medical complications  ? Other:     Prognosis: ? Good X Fair  ? Poor    Patient Requires Follow-up: ? Yes  ? No    Return to Play:    ?  N/A     ? Stage 1: Intro to Strength   ? Stage 2: Dynamic Strength and Intro to Plyometrics   ? Stage 3: Advanced Plyometrics and Intro to Throwing   ? Stage 4: Sport specific Training/Return to Sport     ? Ready to Return to Play, Meets All Above Stages   ? Not Ready for Return to Sports   Comments:      PLAN: Add light resistance with retraction  x Continue per plan of care ? Alter current plan (see comments)   Plan of care initiated ? Hold pending MD visit ?  Discharge    Electronically signed by: Ayanna Ray PT, DPT

## 2019-04-17 NOTE — PROGRESS NOTES
History of Present Illness:  Carlos Redding is a 57-year-old female here for follow-up of her right shoulder. She did undergo a right reverse shoulder arthroplasty on 3/1/2019. Patient reports that since her last visit with that she has not had any new injuries. She also has been compliant with her restrictions including the UltraSling brace. She denies any fevers, chills, numbness or tingling. Medical History:  Patient's medications, allergies, past medical, surgical, social and family histories were reviewed and updated as appropriate. Review of Systems    DONE 10/16/18    Review of Systems  A 14 point review of systems was completed by the patient on 10/16/18 and is available in the media section of the scanned medical record and was reviewed on 4/17/2019. The review is negative with the exception of those things mentioned in the HPI and Past Medical History    Vital Signs:  Vitals:    03/18/19 1439   BP: (!) 145/68   Pulse:        General/Appearance: Alert and oriented and in no apparent distress. Skin:  There are no skin lesions, cellulitis, or extreme edema. The patient has warm and well-perfused Bilateral upper extremities with brisk capillary refill. Right Shoulder Exam:  Inspection: Her anterior shoulder incision is clean dry and intact and well approximated No gross deformities, no signs of infection. Palpation:  No crepitus present    Passive Range of Motion: Forward elevation and 90° and external rotation 10°    Strength:  Deferred    Special Tests:   No Vargas muscle deformity. Neurovascular: Sensation to light touch is intact, no motor deficits, palpable radial pulses 2+         Assessment :  Ms. Carlos Redding is a 61 y.o. yr old patient who underwent a right reverse total shoulder arthroplasty on 3/1/2019        Impression:  Encounter Diagnosis   Name Primary?     S/P reverse total shoulder arthroplasty, right Yes       Office Procedures:  No orders of the defined types were placed in this encounter. Treatment Plan:  She continues to do well in her recovery. We will go ahead and updated her physical therapy orders today. She was told that she may discontinue her sling while she is at home but she would need to continue wearing it when she is outside chronic. All her questions were fully answered today. We will see her back in 3 weeks for follow-up visit. She was agreeable to the above plan. 1:58 PM      [facility], DRISS  Orthopaedic Sports Medicine Physician Assistant    This dictation was performed with a verbal recognition program [facility] ) and it was checked for errors. It is possible that there are still dictated errors within this office note. If so, please bring any errors to my attention for an addendum. All efforts were made to ensure that this office note is accurate.

## 2019-04-19 ENCOUNTER — HOSPITAL ENCOUNTER (OUTPATIENT)
Dept: PHYSICAL THERAPY | Age: 64
Setting detail: THERAPIES SERIES
Discharge: HOME | End: 2019-04-19
Payer: COMMERCIAL

## 2019-04-19 NOTE — FLOWSHEET NOTE
[address] [facility]    Physical Therapy  Cancellation/No-show Note  Patient Name:  Nina Torre  :  1955   Date:  2019  Cancelled visits to date: 0  No-shows to date: 2    For today's appointment patient:  []  Cancelled  []  Rescheduled appointment  [x]  No-show     Reason given by patient:  []  Patient ill  []  Conflicting appointment  []  No transportation    []  Conflict with work  []  No reason given  [x]  Other: called pt this date and pt states she didn't sleep well last night and didn't wake up until noon today and forgot about appt. She didn't have phone number to call and cancel.   Reminded pt of next appt on  @ 11:30 am.   Comments:      Electronically signed by:  Denny Fields PTA

## 2019-04-23 ENCOUNTER — HOSPITAL ENCOUNTER (OUTPATIENT)
Dept: PHYSICAL THERAPY | Age: 64
Setting detail: THERAPIES SERIES
Discharge: HOME | End: 2019-04-23
Payer: COMMERCIAL

## 2019-04-23 PROCEDURE — 97140 MANUAL THERAPY 1/> REGIONS: CPT

## 2019-04-23 PROCEDURE — 97110 THERAPEUTIC EXERCISES: CPT

## 2019-04-23 NOTE — FLOWSHEET NOTE
[address] [facility]    Physical Therapy Daily Treatment Note  Date:  2019    Patient Name:  Alfredo Wheeler    :  1955  MRN: [id number]  Medical/Treatment Diagnosis Information:  Diagnosis: A26.495 s/P Reverse TSA (R)  Treatment Diagnosis: M25.511 Pain in (R) Shoulder, decreased ROM, strength  Insurance/Certification information:  PT Insurance Information: Dream[employer] Hageman Medicare  Physician Information:  Referring Practitioner: Dr. Fior Hidalgo of care signed (Y/N): y    Date of Patient follow up with Physician:     Functional score:      Date functional scale completed    PT G-Codes  Functional Assessment Tool Used: Quick Dash  Score: 61% LOF    Progress Note: ?  Yes  ? No  Next due by: Visit #10      Latex Allergy:  ?NO      ? YES  Preferred Language for Healthcare:   ?English       ? other:    Visit # Insurance Allowable Date Range   4 Eval + 30 up to $2010 N/A     Pain level: 5/10     SUBJECTIVE:  Pt missed her last appointment due to oversleeping. Pt notes her shoulder continues to cause her severe pain at home. Pt notes she does okay while in therapy. Pt states she is doing her HEP, but only every other day.     OBJECTIVE: 19   Observation    Test measurements:     PROM:  Flex: 102  ER: 40 deg (limited per protocol)     RESTRICTIONS/PRECAUTIONS: HTN, Fibromyalgia, Depression, Anxiety, Parkinson's Disease, MI, CAD, Type II Diabetes (on insulin),  PROTOCOL IN MEDIA       SURGERY DATE: 3/1/19    Exercises/Interventions:   Therapeutic Exercise x20' Resistance / level Sets/sec Reps Notes          Scap retraction  3\" 30 HEP cueing to minimize shrugging   Pendulums   X 20 HEP  Cueing for Held   ER with wand/AAROM Limited to 40 or per tolerance if before 40 deg  x10 HEP cueing for plane of movement for proper ROM Held   Supine AAROM flex with wand Current limit is 140 deg(pt not yet to this point)  x10 HEP WAND added    Abduction isometrics 10\" 10 Added 4/5   AROM elbow   10 Attempted 4/9 but stopped due to pain Added back in 4/16   Table Slides Flex 5\" 10 Added 4/9   Pulleys FE 5\" 2 10 Added 4/16   Bicep Curls 1lb 2 10 Added 4/23                        Neuromuscular Re-ed / Therapeutic Activities                                                         Manual Intervention x 14'       Shld /GH Mobs       Post Cap mobs       Thoracic/Rib manipualtion       STM (R) upper trap, anterior and posterior shoulder 3'     PROM MT Flex, ER,  abd 10'  Within protocol ROM limits              Therapeutic Exercise and NMR EXR  X  (91935) Provided verbal/tactile cueing for activities related to strengthening, flexibility, endurance, ROM  for improvements in scapular, scapulothoracic and UE control with self care, reaching, carrying, lifting, house/yardwork, driving/computer work. ? (29811) Provided verbal/tactile cueing for activities related to improving balance, coordination, kinesthetic sense, posture, motor skill, proprioception  to assist with  scapular, scapulothoracic and UE control with self care, reaching, carrying, lifting, house/yardwork, driving/computer work. Therapeutic Activities:    X  (11336 or 21111) Provided verbal/tactile cueing for activities related to improving balance, coordination, kinesthetic sense, posture, motor skill, proprioception and motor activation to allow for proper function of scapular, scapulothoracic and UE control with self care, carrying, lifting, driving/computer work.      Home Exercise Program:    ? (22254) Reviewed/Progressed HEP activities related to strengthening, flexibility, endurance, ROM of scapular, scapulothoracic and UE control with self care, reaching, carrying, lifting, house/yardwork, driving/computer work  ? (33646) Reviewed/Progressed HEP activities related to improving balance, coordination, kinesthetic sense, posture, motor skill, proprioception of scapular, scapulothoracic and UE control with self care, reaching, carrying, lifting, house/yardwork, driving/computer work      Manual Treatments:  PROM / STM / Oscillations-Mobs:  G-I, II, III, IV (PA's, Inf., Post.)  X (21122) Provided manual therapy to mobilize soft tissue/joints of cervical/CT, scapular GHJ and UE for the purpose of modulating pain, promoting relaxation,  increasing ROM, reducing/eliminating soft tissue swelling/inflammation/restriction, improving soft tissue extensibility and allowing for proper ROM for normal function with self care, reaching, carrying, lifting, house/yardwork, driving/computer work    Modalities: declined      Charges:  Timed Code Treatment Minutes: 34'   Total Treatment Minutes: 41'        EVAL - LOW (79294)   ? EVAL - MOD (21814)  ? EVAL - HIGH (53671)  ? RE-EVAL (63986)  X ZP(95819) x 1  ? IONTO  ? NMR (71815) x     ? VASO  X Manual (17978) x 1   ? Other:  ? TA x      ? Mech Traction (67230)  ? ES(attended) (72625)     ? ES (un) (85498):     GOALS:  Patient stated goal: daily use of arm    Therapist goals for Patient:   Short Term Goals: To be achieved in: 2 weeks  1. Independent in HEP and progression per patient tolerance, in order to prevent re-injury. 2. Patient will have a decrease in pain to facilitate improvement in movement, function, and ADLs as indicated by Functional Deficits. Long Term Goals: To be achieved in: 12 weeks  1. Disability index score of 30% or less for the UEFI or Quick DASH to assist with reaching prior level of function. 2. Patient will demonstrate increased AROM to 135 deg or > (R) flex/abd to allow for proper joint functioning as indicated by patients Functional Deficits. 3. Patient will demonstrate an increase in Strength to 4/5 or > (R) IR, and flex to allow for proper functional mobility as indicated by patients Functional Deficits. 4. Patient will return to functional activities including light to moderate housework activities without increased symptoms or restriction.

## 2019-04-26 ENCOUNTER — HOSPITAL ENCOUNTER (OUTPATIENT)
Dept: PHYSICAL THERAPY | Age: 64
Setting detail: THERAPIES SERIES
Discharge: HOME | End: 2019-04-26
Payer: COMMERCIAL

## 2019-04-26 PROCEDURE — 97110 THERAPEUTIC EXERCISES: CPT

## 2019-04-26 PROCEDURE — 97140 MANUAL THERAPY 1/> REGIONS: CPT

## 2019-04-30 ENCOUNTER — HOSPITAL ENCOUNTER (OUTPATIENT)
Dept: PHYSICAL THERAPY | Age: 64
Setting detail: THERAPIES SERIES
Discharge: HOME | End: 2019-04-30
Payer: COMMERCIAL

## 2019-04-30 PROCEDURE — 97110 THERAPEUTIC EXERCISES: CPT | Performed by: PHYSICAL THERAPIST

## 2019-04-30 PROCEDURE — 97140 MANUAL THERAPY 1/> REGIONS: CPT | Performed by: PHYSICAL THERAPIST

## 2019-04-30 NOTE — FLOWSHEET NOTE
[address] [facility]    Physical Therapy Daily Treatment Note  Date:  2019    Patient Name:  Uma Cohn    :  1955  MRN: [id number]  Medical/Treatment Diagnosis Information:  Diagnosis: C66.772 s/P Reverse TSA (R)  Treatment Diagnosis: M25.511 Pain in (R) Shoulder, decreased ROM, strength  Insurance/Certification information:  PT Insurance Information: Misty Abbot Medicare  Physician Information:  Referring Practitioner: Dr. Holden Woods of care signed (Y/N): y    Date of Patient follow up with Physician:     Functional score:      Date functional scale completed    PT G-Codes  Functional Assessment Tool Used: Quick Dash  Score: 61% LOF    Progress Note: ? Yes  X ? No  Next due by: Visit #10      Latex Allergy:  ?NO      ? YES  Preferred Language for Healthcare:   ?English       ? other:    Visit # Insurance Allowable Date Range   6 Eval + 30 up to $2010 N/A     Pain level: current = 8.5/10     SUBJECTIVE:  Pt states shoulder feels stiff. Per pt, feels she may have overworked it last Wednesday or Thursday when working on flexion and \"I haven't been able to use it much since then. \" Pt c/o shoulder pain since being out of pain patches. At times, ice helps, but at other times, ice aggravates it. When ice bothers it, tries heat, which provides temporary relief. HEP: pt reports compliance. Medication: pt taking 4 Aspirin every 4 hrs. Out of pain patches and has been for over a week due to waiting for pre-authorization. Pt states misplaced her last Percocet prescription from the pain clinic.      OBJECTIVE: 19    PROM:  Flex: 108  ER: 40 deg (limited per protocol)     RESTRICTIONS/PRECAUTIONS: HTN, Fibromyalgia, Depression, Anxiety, Parkinson's Disease, MI, CAD, Type II Diabetes (on insulin),  PROTOCOL IN MEDIA       SURGERY DATE: 3/1/19    Exercises/Interventions:   Therapeutic Exercise  Resistance / level Sets/sec Reps Notes   Shrugs    30 flexibility, endurance, ROM of scapular, scapulothoracic and UE control with self care, reaching, carrying, lifting, house/yardwork, driving/computer work  ? (24217) Reviewed/Progressed HEP activities related to improving balance, coordination, kinesthetic sense, posture, motor skill, proprioception of scapular, scapulothoracic and UE control with self care, reaching, carrying, lifting, house/yardwork, driving/computer work      Manual Treatments:  PROM / STM / Oscillations-Mobs:  G-I, II, III, IV (PA's, Inf., Post.)  X ([id number]) Provided manual therapy to mobilize soft tissue/joints of cervical/CT, scapular GHJ and UE for the purpose of modulating pain, promoting relaxation,  increasing ROM, reducing/eliminating soft tissue swelling/inflammation/restriction, improving soft tissue extensibility and allowing for proper ROM for normal function with self care, reaching, carrying, lifting, house/yardwork, driving/computer work    Modalities: ice - HEP    Charges:  Timed Code Treatment Minutes: 45   Total Treatment Minutes: 58        EVAL - LOW (61909)   ? EVAL - MOD (65701)  ? EVAL - HIGH (93738)  ? RE-EVAL (33750)  X EX(50481) x 1  ? IONTO  ? NMR (69560) x     ? VASO  X Manual (13002) x 1   ? Other:  ? TA x      ? Mech Traction (52706)  ? ES(attended) (72170)     ? ES (un) (57983):     GOALS:  Patient stated goal: daily use of arm    Therapist goals for Patient:   Short Term Goals: To be achieved in: 2 weeks  1. Independent in HEP and progression per patient tolerance, in order to prevent re-injury. 2. Patient will have a decrease in pain to facilitate improvement in movement, function, and ADLs as indicated by Functional Deficits. Long Term Goals: To be achieved in: 12 weeks  1. Disability index score of 30% or less for the UEFI or Quick DASH to assist with reaching prior level of function.    2. Patient will demonstrate increased AROM to 135 deg or > (R) flex/abd to allow for proper joint functioning as indicated by patients Functional Deficits. 3. Patient will demonstrate an increase in Strength to 4/5 or > (R) IR, and flex to allow for proper functional mobility as indicated by patients Functional Deficits. 4. Patient will return to functional activities including light to moderate housework activities without increased symptoms or restriction. Progression Towards Functional goals:  ? Patient is progressing as expected towards functional goals listed. ? Progression is slowed due to complexities listed. ? Progression has been slowed due to co-morbidities. ? Plan just implemented, too soon to assess goals progression  ? Other:     Persisting Functional Limitations/Impairments:  ?Sitting ? Standing   ? Walking ? Squatting/bending    ? Stairs X ADL's    xTransfers X Reaching  X Housework ? Job related tasks  ? Driving X Sports/Recreation   ? Other:    ASSESSMENT:    Pt demonstrates fair shoulder motion this date with passive stretching but is still limited by joint stiffness and some muscle guarding. Pt demonstrates continued low pain threshold with exercises. Continue to progress exercises and motion as tolerated and per protocol to work toward functional movement and use of extremity for daily tasks. Pt is currently limited in functional tasks and has difficulty with self care tasks, household tasks, lifting, carrying and reaching in mulitplanar directions. Treatment/Activity Tolerance:  ? Patient tolerated treatment well ? Patient limited by fatique  X  Patient limited by pain  ? Patient limited by other medical complications  ? Other:     Prognosis: ? Good X Fair  ? Poor    Patient Requires Follow-up: ? Yes  ? No    Return to Play:    ?  N/A   ? Stage 1: Intro to Strength   ? Stage 2: Dynamic Strength and Intro to Plyometrics   ? Stage 3: Advanced Plyometrics and Intro to Throwing   ? Stage 4: Sport specific Training/Return to Sport   ? Ready to Return to Play, Meets All Above Stages   ?   Not Ready for Return to Sports   Comments:      PLAN: Add light resistance with retraction  x Continue per plan of care ? Alter current plan (see comments)   Plan of care initiated ? Hold pending MD visit ?  Discharge    Electronically signed by: Valdo Carl DPT

## 2019-05-06 ENCOUNTER — HOSPITAL ENCOUNTER (OUTPATIENT)
Dept: PHYSICAL THERAPY | Age: 64
Setting detail: THERAPIES SERIES
Discharge: HOME | End: 2019-05-06
Payer: COMMERCIAL

## 2019-05-06 NOTE — FLOWSHEET NOTE
[address] [facility]    Physical Therapy  Cancellation/No-show Note  Patient Name:  Benedetto Brunner  :  1955   Date:  2019  Cancelled visits to date: 0  No-shows to date: 3    For today's appointment patient:  []  Cancelled  []  Rescheduled appointment  [x]  No-show     Reason given by patient:  []  Patient ill  []  Conflicting appointment  []  No transportation    []  Conflict with work  [x]  No reason given  []  Other:    Comments:      Electronically signed by:  Lexis Ken PTA

## 2019-05-08 ENCOUNTER — OFFICE VISIT (OUTPATIENT)
Dept: ORTHOPEDIC SURGERY | Age: 64
End: 2019-05-08

## 2019-05-08 VITALS
BODY MASS INDEX: 25.47 KG/M2 | HEIGHT: 61 IN | DIASTOLIC BLOOD PRESSURE: 70 MMHG | WEIGHT: 134.92 LBS | SYSTOLIC BLOOD PRESSURE: 155 MMHG | HEART RATE: 82 BPM

## 2019-05-08 DIAGNOSIS — Z96.611 S/P REVERSE TOTAL SHOULDER ARTHROPLASTY, RIGHT: Primary | ICD-10-CM

## 2019-05-08 PROCEDURE — 99024 POSTOP FOLLOW-UP VISIT: CPT | Performed by: ORTHOPAEDIC SURGERY

## 2019-05-08 NOTE — PROGRESS NOTES
History of Present Illness:  Aditya Anderson is a pleasant, 61 y.o., female, here today for follow up of right shoulder. She is nearly 10 weeks out following a right reverse total shoulder arthrplasty on 3/1/19. She has continued in physical therapy at [place] working with [facility]. She has been diligent in her exercises at home, but she is still struggling with gaining motion. She reports no new injuries or setbacks. Medical History:  Patient's medications, allergies, past medical, surgical, social and family histories were reviewed and updated as appropriate. Review of Systems  A 14 point review of systems was completed by the patient on 10/16/18 and is available in the media section of the scanned medical record and was reviewed on 5/8/2019. The review is negative with the exception of those things mentioned in the HPI and Past Medical History    Vital Signs:  Vitals:    05/08/19 1046   BP: (!) 155/70   Pulse: 82       General/Appearance: Alert and oriented and in no apparent distress. Skin:  There are no skin lesions, cellulitis, or extreme edema. The patient has warm and well-perfused Bilateral upper extremities with brisk capillary refill. RIGHT Shoulder Exam:  Inspection:  No gross deformities, no signs of infection. Palpation:  No crepitation. She is trending stiff    Active Range of Motion: Forward Elevation 60, External Rotation -10, Internal Rotation thigh    Passive Range of Motion: Forward Elevation 90, External Rotation 10, Internal Rotation Sacrum     Strength: Deferred    Special Tests:  20 degree External Rotation Lag. No Vargas muscle deformity. Neurovascular: Sensation to light touch is intact, no motor deficits, palpable radial pulses 2+        Radiology:     No new XR obtained at this time. Assessment :  Ms. Aditya Anderson is a pleasant, 61 y.o. patient who is nearly 10 weeks out following a right reverse total shoulder arthrplasty on 3/1/19. She is trending stiff. Impression:  Encounter Diagnosis   Name Primary?  S/P reverse total shoulder arthroplasty, right Yes       Office Procedures:  Orders Placed This Encounter   Procedures   [address]     Referral Priority:   Routine     Referral Type:   Eval and Treat     Referral Reason:   Specialty Services Required     Requested Specialty:   Physical Therapy     Number of Visits Requested:   1       Treatment Plan:  Juarez Man is trending slightly stiff, but she is showing improvements. Continue in physical therapy at [facility]. A new physical therapy letter was documented in EPIC today. We will see Snow Pelayo back in 4-5 weeks and/or as needed. All questions were answered to patient's satisfaction and was encouraged to call with any further questions or concerns. Erika Chang is in agreement with this plan. 5/8/2019  11:45 AM      Tiburcio Garcia ATC  Orthopaedic Sports Medicine     During this examination, DANIEL, Tiburcio Garcia ATC, functioned as a scribe for Dr. Francisca Collins. The history taking and physical examination were performed by Dr. Satish Koch. All counsleing during the appointment was performed between the patient and Dr. Satish Koch. 5/8/19   _____________  I, Dr. Francisca Collins, personally performed the services described in this documentation as described by Tiburcio Garcia ATC in my presence, and it is both accurate and complete. Shiv Koch MD, PhD  5/8/2019

## 2019-05-10 ENCOUNTER — HOSPITAL ENCOUNTER (OUTPATIENT)
Dept: PHYSICAL THERAPY | Age: 64
Setting detail: THERAPIES SERIES
Discharge: HOME | End: 2019-05-10
Payer: COMMERCIAL

## 2019-05-10 PROCEDURE — 97140 MANUAL THERAPY 1/> REGIONS: CPT

## 2019-05-10 PROCEDURE — 97110 THERAPEUTIC EXERCISES: CPT

## 2019-05-10 NOTE — FLOWSHEET NOTE
[address] [facility] [address]    Physical Therapy Daily Treatment Note  Date:  5/10/2019    Patient Name:  Luis A Sawyer    :  1955  MRN: [id number]  Medical/Treatment Diagnosis Information:  Diagnosis: J05.279 s/P Reverse TSA (R)  Treatment Diagnosis: M25.511 Pain in (R) Shoulder, decreased ROM, strength  Insurance/Certification information:  PT Insurance Information: Richad Foy Medicare  Physician Information:  Referring Practitioner: Dr. Devries Labor of care signed (Y/N): y    Date of Patient follow up with Physician:     Functional score:      Date functional scale completed    PT G-Codes  Functional Assessment Tool Used: Quick Dash  Score: 61% LOF    Progress Note: ? Yes  X ? No  Next due by: Visit #10      Latex Allergy:  ?NO      ? YES  Preferred Language for Healthcare:   ?English       ? other:    Visit # Insurance Allowable Date Range   7 Eval + 30 up to $2010 N/A     Pain level: current = 9/10     SUBJECTIVE:      Medication: pt taking 4 Aspirin every 4 hrs. Out of pain patches and has been for over a week due to waiting for pre-authorization. Pt states misplaced her last Percocet prescription from the pain clinic.      OBJECTIVE: 5/10/19    PROM:  Flex: 115  ER: 45 deg (limited per protocol)     RESTRICTIONS/PRECAUTIONS: HTN, Fibromyalgia, Depression, Anxiety, Parkinson's Disease, MI, CAD, Type II Diabetes (on insulin),  PROTOCOL IN MEDIA       SURGERY DATE: 3/1/19    Exercises/Interventions:   Therapeutic Exercise  Resistance / level Sets/sec Reps Notes   Shrugs    30 Added    Scap retraction Green band 3\" 30 HEP cueing to minimize shrugging  ^5/10   Pendulums   X 20 HEP     ER with wand/AAROM Limited to 40 or per tolerance if before 40 deg  10 HEP cueing for plane of movement for proper ROM Held   Supine AAROM flex with wand Current limit is 140 deg(pt not yet to this point)  x10 HEP WAND added    Abduction/ER isometrics  10\" 10 Added  Added back in 4/16  Increased sets 4/30   Table Slides Flex 5\" 10 Added 4/9   Pulleys FE 5\" 2 10 Added 4/16   Bicep Curls 1lb 2 10 Added 4/23                 Neuromuscular Re-ed / Therapeutic Activities                                                         Manual Intervention        Shld /GH Mobs       Post Cap mobs       Thoracic/Rib manipualtion       STM (R) upper trap, anterior and posterior shoulder 3'     PROM MT Flex, ER,  abd 10'  Within protocol ROM limits              Therapeutic Exercise and NMR EXR  X  (09535) Provided verbal/tactile cueing for activities related to strengthening, flexibility, endurance, ROM  for improvements in scapular, scapulothoracic and UE control with self care, reaching, carrying, lifting, house/yardwork, driving/computer work. ? (78510) Provided verbal/tactile cueing for activities related to improving balance, coordination, kinesthetic sense, posture, motor skill, proprioception  to assist with  scapular, scapulothoracic and UE control with self care, reaching, carrying, lifting, house/yardwork, driving/computer work. Therapeutic Activities:    X  (43183 or 61472) Provided verbal/tactile cueing for activities related to improving balance, coordination, kinesthetic sense, posture, motor skill, proprioception and motor activation to allow for proper function of scapular, scapulothoracic and UE control with self care, carrying, lifting, driving/computer work.      Home Exercise Program:    ? (67340) Reviewed/Progressed HEP activities related to strengthening, flexibility, endurance, ROM of scapular, scapulothoracic and UE control with self care, reaching, carrying, lifting, house/yardwork, driving/computer work  ? (76556) Reviewed/Progressed HEP activities related to improving balance, coordination, kinesthetic sense, posture, motor skill, proprioception of scapular, scapulothoracic and UE control with self care, reaching, carrying, lifting, house/yardwork, driving/computer work      Manual Treatments:  PROM / STM / Oscillations-Mobs:  G-I, II, III, IV (PA's, Inf., Post.)  X (44360) Provided manual therapy to mobilize soft tissue/joints of cervical/CT, scapular GHJ and UE for the purpose of modulating pain, promoting relaxation,  increasing ROM, reducing/eliminating soft tissue swelling/inflammation/restriction, improving soft tissue extensibility and allowing for proper ROM for normal function with self care, reaching, carrying, lifting, house/yardwork, driving/computer work    Modalities: ice - HEP    Charges:  Timed Code Treatment Minutes: 50   Total Treatment Minutes: 60        EVAL - LOW (12246)   ? EVAL - MOD (00360)  ? EVAL - HIGH (46795)  ? RE-EVAL (63864)  X PN(56660) x 2  ? IONTO  ? NMR (14660) x     ? VASO  X Manual (09657) x 1   ? Other:  ? TA x      ? Mech Traction (45834)  ? ES(attended) (26191)     ? ES (un) (46017):     GOALS:  Patient stated goal: daily use of arm    Therapist goals for Patient:   Short Term Goals: To be achieved in: 2 weeks  1. Independent in HEP and progression per patient tolerance, in order to prevent re-injury. 2. Patient will have a decrease in pain to facilitate improvement in movement, function, and ADLs as indicated by Functional Deficits. Long Term Goals: To be achieved in: 12 weeks  1. Disability index score of 30% or less for the UEFI or Quick DASH to assist with reaching prior level of function. 2. Patient will demonstrate increased AROM to 135 deg or > (R) flex/abd to allow for proper joint functioning as indicated by patients Functional Deficits. 3. Patient will demonstrate an increase in Strength to 4/5 or > (R) IR, and flex to allow for proper functional mobility as indicated by patients Functional Deficits. 4. Patient will return to functional activities including light to moderate housework activities without increased symptoms or restriction.      Progression Towards Functional goals:  ? Patient is progressing as expected towards functional goals listed. ? Progression is slowed due to complexities listed. ? Progression has been slowed due to co-morbidities. ? Plan just implemented, too soon to assess goals progression  ? Other:     Persisting Functional Limitations/Impairments:  ?Sitting ? Standing   ? Walking ? Squatting/bending    ? Stairs X ADL's    xTransfers X Reaching  X Housework ? Job related tasks  ? Driving X Sports/Recreation   ? Other:    ASSESSMENT:   Pt demonstrates limited motion with PROM and AAROM this date. Discussed with pt need to obtain pullys for home and focus on using arm throughout the day as well as using pullys x4-5 min x4x/day for improving ROM. Cueing to maintain proper mechanics and not compensate with upper trap elevation. Pt did report less pain by end of session this date after increased focus on manual stretching performed. Continue to focus on improving motion in order to progress strengthening program for return to performing functional self care activity. Pt remains extremely limited in activity at this time due to pain and limited motion as well as limited strength. Continue skilled therapy to progress toward goals for improved functional activity level. Treatment/Activity Tolerance:  ? Patient tolerated treatment well ? Patient limited by fatique  X  Patient limited by pain  ? Patient limited by other medical complications  ? Other:     Prognosis: ? Good X Fair  ? Poor    Patient Requires Follow-up: ? Yes  ? No    Return to Play:    ?  N/A   ? Stage 1: Intro to Strength   ? Stage 2: Dynamic Strength and Intro to Plyometrics   ? Stage 3: Advanced Plyometrics and Intro to Throwing   ? Stage 4: Sport specific Training/Return to Sport   ? Ready to Return to Play, Meets All Above Stages   ? Not Ready for Return to Sports   Comments:      PLAN: Add light resistance with retraction  x Continue per plan of care ? Alter current plan (see comments)   Plan of care initiated ?  Hold pending MD visit ?  Discharge    Electronically signed by: Odessa Weaver [id number]

## 2019-05-14 ENCOUNTER — HOSPITAL ENCOUNTER (OUTPATIENT)
Dept: PHYSICAL THERAPY | Age: 64
Setting detail: THERAPIES SERIES
Discharge: HOME | End: 2019-05-14
Payer: COMMERCIAL

## 2019-05-14 PROCEDURE — 97110 THERAPEUTIC EXERCISES: CPT

## 2019-05-14 PROCEDURE — 97140 MANUAL THERAPY 1/> REGIONS: CPT

## 2019-05-14 NOTE — FLOWSHEET NOTE
sets 4/30   Table Slides Flex 5\" 10 Added 4/9   Pulleys FE 5\" 2 10 Added 4/16 cueing for proper plane of movement   Bicep Curls 2lb 2 10 Added 4/23 ^ 5/14 cueing for full ROM   Sidelying ER No wt   NPV? Neuromuscular Re-ed / Therapeutic Activities                                                         Manual Intervention x14'       Shld /GH Mobs       Post Cap mobs       Thoracic/Rib manipualtion       STM (R) upper trap, anterior and posterior shoulder 4'     PROM MT Flex, ER,  abd 10'  Within protocol ROM limits              Therapeutic Exercise and NMR EXR  X  (30367) Provided verbal/tactile cueing for activities related to strengthening, flexibility, endurance, ROM  for improvements in scapular, scapulothoracic and UE control with self care, reaching, carrying, lifting, house/yardwork, driving/computer work. ? (34283) Provided verbal/tactile cueing for activities related to improving balance, coordination, kinesthetic sense, posture, motor skill, proprioception  to assist with  scapular, scapulothoracic and UE control with self care, reaching, carrying, lifting, house/yardwork, driving/computer work. Therapeutic Activities:    X  (07968 or 22949) Provided verbal/tactile cueing for activities related to improving balance, coordination, kinesthetic sense, posture, motor skill, proprioception and motor activation to allow for proper function of scapular, scapulothoracic and UE control with self care, carrying, lifting, driving/computer work.      Home Exercise Program:    ? (51308) Reviewed/Progressed HEP activities related to strengthening, flexibility, endurance, ROM of scapular, scapulothoracic and UE control with self care, reaching, carrying, lifting, house/yardwork, driving/computer work  ? (25242) Reviewed/Progressed HEP activities related to improving balance, coordination, kinesthetic sense, posture, motor skill, proprioception of scapular, scapulothoracic and UE control with self care, reaching, carrying, lifting, house/yardwork, driving/computer work      Manual Treatments:  PROM / STM / Oscillations-Mobs:  G-I, II, III, IV (PA's, Inf., Post.)  X (24941) Provided manual therapy to mobilize soft tissue/joints of cervical/CT, scapular GHJ and UE for the purpose of modulating pain, promoting relaxation,  increasing ROM, reducing/eliminating soft tissue swelling/inflammation/restriction, improving soft tissue extensibility and allowing for proper ROM for normal function with self care, reaching, carrying, lifting, house/yardwork, driving/computer work    Modalities: ice - HEP    Charges:  Timed Code Treatment Minutes: 44'   Total Treatment Minutes: 48'        EVAL - LOW (49541)   ? EVAL - MOD (65985)  ? EVAL - HIGH (68488)  ? RE-EVAL (10532)  X TN(30281) x 2  ? IONTO  ? NMR (42014) x     ? VASO  X Manual (00561) x 1   ? Other:  ? TA x      ? Mech Traction (26727)  ? ES(attended) (48814)     ? ES (un) (21354):     GOALS:  Patient stated goal: daily use of arm    Therapist goals for Patient:   Short Term Goals: To be achieved in: 2 weeks  1. Independent in HEP and progression per patient tolerance, in order to prevent re-injury. 2. Patient will have a decrease in pain to facilitate improvement in movement, function, and ADLs as indicated by Functional Deficits. Long Term Goals: To be achieved in: 12 weeks  1. Disability index score of 30% or less for the UEFI or Quick DASH to assist with reaching prior level of function. 2. Patient will demonstrate increased AROM to 135 deg or > (R) flex/abd to allow for proper joint functioning as indicated by patients Functional Deficits. 3. Patient will demonstrate an increase in Strength to 4/5 or > (R) IR, and flex to allow for proper functional mobility as indicated by patients Functional Deficits. 4. Patient will return to functional activities including light to moderate housework activities without increased symptoms or restriction.      Progression

## 2019-05-15 ENCOUNTER — TELEPHONE (OUTPATIENT)
Dept: ORTHOPEDIC SURGERY | Age: 64
End: 2019-05-15

## 2019-05-17 ENCOUNTER — HOSPITAL ENCOUNTER (OUTPATIENT)
Dept: PHYSICAL THERAPY | Age: 64
Setting detail: THERAPIES SERIES
Discharge: HOME | End: 2019-05-17
Payer: COMMERCIAL

## 2019-05-17 PROCEDURE — 97110 THERAPEUTIC EXERCISES: CPT

## 2019-05-17 NOTE — FLOWSHEET NOTE
[address] [facility]    Physical Therapy Daily Treatment Note  Date:  2019    Patient Name:  Jeffry Ny    :  1955  MRN: [id number]  Medical/Treatment Diagnosis Information:  Diagnosis: W16.954 s/P Reverse TSA (R)  Treatment Diagnosis: M25.511 Pain in (R) Shoulder, decreased ROM, strength  Insurance/Certification information:  PT Insurance Information: Ashok Villatoro Medicare  Physician Information:  Referring Practitioner: Dr. Emily Friedman of care signed (Y/N): y    Date of Patient follow up with Physician:     Functional score:      Date functional scale completed    PT G-Codes  Functional Assessment Tool Used: Quick Dash  Score: 61% LOF    Progress Note:   Yes  X ? No  Next due by: Visit #10  (NPV)    Latex Allergy:  ?NO      ? YES  Preferred Language for Healthcare:   ?English       ? other:    Visit # Insurance Allowable Date Range   9 Eval + 30 up to $2010 N/A     Pain level: 6/10     SUBJECTIVE:    Pt states that she fell two days ago in the lobby of her apartment complex. Pt states she fell on her back and (L) UE. Pt states that she tried to protect her (R) UE. Pt notes she fell when walking her dog. Pt went to the ED and had imaging of her (B) shoulders and chest/trunk. A (R) 5th rib fracture was found as well as possible evidence of (R) AC joint separation . Pt. States that imaging of (L) shoulder was negative, but that she still has significant bruising around her (L) shoulder. Pt states that her (R) shoulder has been severely painful since the fall, but that it is gradually getting better. Pt states she is now struggling with her basic mobility exercises. Pt states that she contacted her surgeon Dr. Frances Longoria office so that they are aware of the findings.     OBJECTIVE: 19    Bruising noted anterior upper arm (R),  No significant bro deformity noted    PROM:  Flex: 90 significant pain  ER: 30 deg    RESTRICTIONS/PRECAUTIONS: HTN, Fibromyalgia, Depression, Anxiety, Parkinson's Disease, MI, CAD, Type II Diabetes (on insulin),  PROTOCOL IN MEDIA       SURGERY DATE: 3/1/19    Exercises/Interventions:   Therapeutic Exercise x17' Resistance / level Sets/sec Reps Notes   Shrugs    30 Added 4/30    Scap retraction  3\" 30 HEP cueing to minimize shrugging  ^5/10 Held resistance band 5/17   Pendulums   X 20 HEP      ER with wand/AAROM Limited to 40 or per tolerance if before 40 deg 5\" 10 HEP cueing for plane of movement for proper ROM Held   Supine AAROM flex with wand Current limit is 140 deg(pt not yet to this point)  x10 HEP WAND added 4/23 Held   Abduction/ER isometrics  5\" 2 10 Added 4/5 ^ sets 5/14 HEld     Added back in 4/16  Increased sets 4/30   Table Slides Flex 5\" 2 10 Added 4/9   Pulleys FE 5\" 2 10 Added 4/16 cueing for proper plane of movement Held          Neuromuscular Re-ed / Therapeutic Activities                                                         Manual Intervention x10'       Shld /GH Mobs       Post Cap mobs       Thoracic/Rib manipualtion       STM (R) upper trap, anterior and r 5'     PROM MT Gentle Flex, ER,  abd 5'  Within protocol ROM limits              Therapeutic Exercise and NMR EXR  X  (97622) Provided verbal/tactile cueing for activities related to strengthening, flexibility, endurance, ROM  for improvements in scapular, scapulothoracic and UE control with self care, reaching, carrying, lifting, house/yardwork, driving/computer work. ? (45970) Provided verbal/tactile cueing for activities related to improving balance, coordination, kinesthetic sense, posture, motor skill, proprioception  to assist with  scapular, scapulothoracic and UE control with self care, reaching, carrying, lifting, house/yardwork, driving/computer work.     Therapeutic Activities:    X  (14958 or 86915) Provided verbal/tactile cueing for activities related to improving balance, coordination, kinesthetic sense, posture, motor skill, proprioception and motor activation to allow for proper function of scapular, scapulothoracic and UE control with self care, carrying, lifting, driving/computer work. Home Exercise Program:    ? (25897) Reviewed/Progressed HEP activities related to strengthening, flexibility, endurance, ROM of scapular, scapulothoracic and UE control with self care, reaching, carrying, lifting, house/yardwork, driving/computer work  ? (16497) Reviewed/Progressed HEP activities related to improving balance, coordination, kinesthetic sense, posture, motor skill, proprioception of scapular, scapulothoracic and UE control with self care, reaching, carrying, lifting, house/yardwork, driving/computer work      Manual Treatments:  PROM / STM / Oscillations-Mobs:  G-I, II, III, IV (PA's, Inf., Post.)  X ([id number]) Provided manual therapy to mobilize soft tissue/joints of cervical/CT, scapular GHJ and UE for the purpose of modulating pain, promoting relaxation,  increasing ROM, reducing/eliminating soft tissue swelling/inflammation/restriction, improving soft tissue extensibility and allowing for proper ROM for normal function with self care, reaching, carrying, lifting, house/yardwork, driving/computer work    Modalities: deferred (pt using ice at home)    Charges:  Timed Code Treatment Minutes: 39'   Total Treatment Minutes: 48'        EVAL - LOW (63241)   ? EVAL - MOD (22284)  ? EVAL - HIGH (24911)  ? RE-EVAL (52155)  X PU(42524) x 1  ? IONTO  ? NMR (92565) x     ? VASO  X Manual (52911) x 1   ? Other:  ? TA x      ? Mech Traction (07395)  ? ES(attended) (69293)     ? ES (un) (89452):     GOALS:  Patient stated goal: daily use of arm    Therapist goals for Patient:   Short Term Goals: To be achieved in: 2 weeks  1. Independent in HEP and progression per patient tolerance, in order to prevent re-injury.    2. Patient will have a decrease in pain to facilitate improvement in movement, function, and ADLs as indicated by Functional Deficits. Long Term Goals: To be achieved in: 12 weeks  1. Disability index score of 30% or less for the UEFI or Quick DASH to assist with reaching prior level of function. 2. Patient will demonstrate increased AROM to 135 deg or > (R) flex/abd to allow for proper joint functioning as indicated by patients Functional Deficits. 3. Patient will demonstrate an increase in Strength to 4/5 or > (R) IR, and flex to allow for proper functional mobility as indicated by patients Functional Deficits. 4. Patient will return to functional activities including light to moderate housework activities without increased symptoms or restriction. Progression Towards Functional goals:  ? Patient is progressing as expected towards functional goals listed. x Progression is slowed due to complexities listed. Recent fall  ? Progression has been slowed due to co-morbidities. Plan just implemented, too soon to assess goals progression  ? Other:     Persisting Functional Limitations/Impairments:  ?Sitting ? Standing   ? Walking ? Squatting/bending    ? Stairs X ADL's    xTransfers X Reaching  X Housework ? Job related tasks  ? Driving X Sports/Recreation   ? Other:    ASSESSMENT:   Pt's pain has regressed since her recent fall and pt is significantly less tolerant to ROM exercises at this time. Pt does demonstrate bruising along the anterior upper arm on the (R), however no bro deformity or other visible dysfunction is noted. Encouraged pt to continue working multiple times per day on her mobility to help pt get back to where she was previously. Pt had been demonstrating improvement prior to her fall. Pt's shoulder ROM does remain behind where it would be expected at this point post op. Treatment/Activity Tolerance:  ? Patient tolerated treatment well ? Patient limited by fatique  X  Patient limited by pain  ? Patient limited by other medical complications  ? Other:     Prognosis: ? Good X Fair  ?  Poor    Patient Requires Follow-up: ? Yes  ? No    Return to Play:    ?  N/A   ? Stage 1: Intro to Strength   ? Stage 2: Dynamic Strength and Intro to Plyometrics   ? Stage 3: Advanced Plyometrics and Intro to Throwing   ? Stage 4: Sport specific Training/Return to Sport   ? Ready to Return to Play, Meets All Above Stages   ? Not Ready for Return to Sports   Comments:      PLAN:  Re-assess pain, perform normal exercise program per tolerance  x Continue per plan of care ? Alter current plan (see comments)   Plan of care initiated ? Hold pending MD visit ?  Discharge    Electronically signed by: Martina Teixeira PT, DPT

## 2019-05-23 ENCOUNTER — HOSPITAL ENCOUNTER (OUTPATIENT)
Dept: PHYSICAL THERAPY | Age: 64
Setting detail: THERAPIES SERIES
Discharge: HOME | End: 2019-05-23
Payer: COMMERCIAL

## 2019-05-23 PROCEDURE — 97110 THERAPEUTIC EXERCISES: CPT

## 2019-05-23 PROCEDURE — 97140 MANUAL THERAPY 1/> REGIONS: CPT

## 2019-05-23 NOTE — PLAN OF CARE
Hyun Lujan     Physical Therapy Re-Certification Plan of Care    Dear  Dr. Renny Mcguire,    We had the pleasure of treating the following patient for physical therapy services at [address] [place] [address]. A summary of our findings can be found in the updated assessment below. This includes our plan of care. If you have any questions or concerns regarding these findings, please do not hesitate to contact me at the office phone number checked above. Thank you for the referral.     Physician Signature:________________________________Date:__________________  By signing above (or electronic signature), therapists plan is approved by physician      Functional Outcome: QuickDash 48%    Overall Response to Treatment:   []Patient is responding well to treatment and improvement is noted with regards  to goals   []Patient should continue to improve in reasonable time if they continue HEP   []Patient has plateaued and is no longer responding to skilled PT intervention    []Patient is getting worse and would benefit from return to referring MD   []Patient unable to adhere to initial POC   [x]Other: Pt continues to be limited most due to mobility. This was complicated further due to recent fall. Pt appears to be recovering from recent fall, but does still demonstrate significant mobility deficits in the involved shoulder. Mobility has been the major emphasis here in therapy. Therapist continues to encourage pt to work on ROM exercises 3x/day at home.     Date range of Visits: 19-19  Total Visits: 10  Date:  2019    Patient Name:  Paulo Still    :  1955  MRN: [id number]  Medical/Treatment Diagnosis Information:  Diagnosis: A93.714 s/P Reverse TSA (R)  Treatment Diagnosis: M25.511 Pain in (R) Shoulder, decreased ROM, strength  Insurance/Certification information:  PT Insurance Information: [employer] Medicare  Physician Information:  Referring Practitioner: Dr. Gregorio Begin of care signed (Y/N): y    Date of Patient follow up with Physician:     Functional score:      Date functional scale completed  5/23/19  PT G-Codes  Functional Assessment Tool Used: Melina Caio  Score: 48% LOF    Progress Note:   xYes   ? No  Next due by: Visit #20    Latex Allergy:  ?NO      ? YES  Preferred Language for Healthcare:   ?English       ? other:    Visit # Insurance Allowable Date Range   10 Eval + 30 up to $2010 N/A     Pain level: 0/10 currently     SUBJECTIVE:    Pt states that her shoulder pain has improved greatly over the past few days. Pt feels she has gained more mobility back. Pt states that her tailbone still hurts.     OBJECTIVE: 5/23/19    Strength testing Deferred    PROM:  Flex: 115 deg   ER: 40 deg  Abd: 105 deg    RESTRICTIONS/PRECAUTIONS: HTN, Fibromyalgia, Depression, Anxiety, Parkinson's Disease, MI, CAD, Type II Diabetes (on insulin),  PROTOCOL IN MEDIA       SURGERY DATE: 3/1/19    Exercises/Interventions:   Therapeutic Exercise x20' Resistance / level Sets/sec Reps Notes   Shrugs    30 Added 4/30  Held   Scap retraction Green band  3 10 HEP cueing to minimize shrugging  ^5/10 Held resistance band 5/17 resumed 5/23 cueing for scapular retraction/plane of movement   Pendulums   X 20 HEP    Held   ER with wand/AAROM Limited to 40 or per tolerance if before 40 deg 5\" 10 HEP cueing for plane of movement for proper ROM Held   Supine AAROM flex with wand Current limit is 140 deg(pt not yet to this point)  x10 HEP WAND added 4/23 Held   Abduction/ER isometrics  5\" 2 10 Added 4/5 ^ sets 5/14 Held resume NPV     Added back in 4/16  Increased sets 4/30   Table Slides Flex 5\" 2 10 Added 4/9 cueing for stretch intensity   Pulleys FE 5\" 2 10 Added 4/16 cueing for proper plane of movement Held   Bicep Curls 2lb 2 10 Added 4/23 ^ 5/14 cueing for full ROM          Neuromuscular Re-ed / Therapeutic Activities relaxation,  increasing ROM, reducing/eliminating soft tissue swelling/inflammation/restriction, improving soft tissue extensibility and allowing for proper ROM for normal function with self care, reaching, carrying, lifting, house/yardwork, driving/computer work    Modalities: deferred (pt using ice at home)    Charges:  Timed Code Treatment Minutes: 33'   Total Treatment Minutes: 48'        EVAL - LOW (59301)   ? EVAL - MOD (25782)  ? EVAL - HIGH (40263)  ? RE-EVAL (09346)  X KS(10711) x 1  ? IONTO  ? NMR (37725) x     ? VASO  X Manual (45729) x 1   ? Other:  ? TA x      ? Mech Traction (67735)  ? ES(attended) (92014)     ? ES (un) (39305):     GOALS: 5/23/19  Patient stated goal: daily use of arm    Therapist goals for Patient:   Short Term Goals: To be achieved in: 2 weeks  1. Independent in HEP and progression per patient tolerance, in order to prevent re-injury. MET  2. Patient will have a decrease in pain to facilitate improvement in movement, function, and ADLs as indicated by Functional Deficits. MET    Long Term Goals: To be achieved in: 12 weeks  1. Disability index score of 30% or less for the UEFI or Quick DASH to assist with reaching prior level of function. Progress towards goal  2. Patient will demonstrate increased AROM to 135 deg or > (R) flex/abd to allow for proper joint functioning as indicated by patients Functional Deficits. AROM not yet assessed  3. Patient will demonstrate an increase in Strength to 4/5 or > (R) IR, and flex to allow for proper functional mobility as indicated by patients Functional Deficits. Strength not yet assessed  4. Patient will return to functional activities including light to moderate housework activities without increased symptoms or restriction. Progress towards goal    Progression Towards Functional goals:  ? Patient is progressing as expected towards functional goals listed. x Progression is slowed due to complexities listed. Recent fall  ?  Progression has been slowed due to co-morbidities. Plan just implemented, too soon to assess goals progression  ? Other:     Persisting Functional Limitations/Impairments:  ?Sitting ? Standing   ? Walking ? Squatting/bending    ? Stairs X ADL's    xTransfers X Reaching  X Housework ? Job related tasks  ? Driving X Sports/Recreation   ? Other:    ASSESSMENT:   Pt has regained some of her previous (R) shoulder ROM since her recent fall, but remains extremely stiff. Part of this is due to recent fall, however mobility was limited prior to her fall. Pain was well managed today. Prior to fall, pt was demonstrating slow but steady progress with her (R) shoulder. Pt would continue to benefit from physical therapy following post op protocol for reverse TSA. Pt remains functionally limited due to residual strength and mobility deficits. Treatment/Activity Tolerance:  ? Patient tolerated treatment well ? Patient limited by fatique  X  Patient limited by pain  ? Patient limited by other medical complications  ? Other:     Prognosis: ? Good X Fair  ? Poor    Patient Requires Follow-up: ? Yes  ? No    Return to Play:    ?  N/A   ? Stage 1: Intro to Strength   ? Stage 2: Dynamic Strength and Intro to Plyometrics   ? Stage 3: Advanced Plyometrics and Intro to Throwing   ? Stage 4: Sport specific Training/Return to Sport   ? Ready to Return to Play, Meets All Above Stages   ? Not Ready for Return to Sports   Comments:      PLAN:   Progress per protocol, emphasize mobility  x Continue per plan of care ? Alter current plan (see comments)   Plan of care initiated ? Hold pending MD visit ?  Discharge    Electronically signed by: Mandie Colbert PT, DPT

## 2019-05-28 ENCOUNTER — APPOINTMENT (OUTPATIENT)
Dept: PHYSICAL THERAPY | Age: 64
End: 2019-05-28
Payer: COMMERCIAL

## 2019-05-30 ENCOUNTER — HOSPITAL ENCOUNTER (OUTPATIENT)
Dept: PHYSICAL THERAPY | Age: 64
Setting detail: THERAPIES SERIES
Discharge: HOME | End: 2019-05-30
Payer: COMMERCIAL

## 2019-05-30 PROCEDURE — 97140 MANUAL THERAPY 1/> REGIONS: CPT | Performed by: PHYSICAL THERAPIST

## 2019-05-30 PROCEDURE — 97110 THERAPEUTIC EXERCISES: CPT | Performed by: PHYSICAL THERAPIST

## 2019-05-30 NOTE — FLOWSHEET NOTE
[address] [facility]     Physical Therapy Daily Treatment Note    Date:  2019    Patient Name:  Kalyani Randall    :  1955  MRN: [id number]  Medical/Treatment Diagnosis Information:  Diagnosis: Y40.995 s/P Reverse TSA (R)  Treatment Diagnosis: M25.511 Pain in (R) Shoulder, decreased ROM, strength  Insurance/Certification information:  PT Insurance Information: [employer] Medicare  Physician Information:  Referring Practitioner: Dr. Terell Leiva of care signed (Y/N): y    Date of Patient follow up with Physician: 19    Functional score:      Date functional scale completed  19  PT G-Codes  Functional Assessment Tool Used: Theresa Thomas  Score: 48% LOF    Progress Note:   Yes   X  No  Next due by: Visit #20    Latex Allergy:  ?NO      ? YES  Preferred Language for Healthcare:   ?English       ? other:    Visit # Insurance Allowable Date Range   11 Eval + 30 up to $2010 N/A     Pain level: 10 currently, 9/10 tailbone     SUBJECTIVE:    Pt states that she is feeling sick to her stomach today but she had been able to tolerate it. Pt states that her tailbone still hurts. The pain in her shoulder is increased from last session and she believes that her arthritis is bothering her in her shoulder. Pt states that she has not been able to walk her dog as much lately due to pain. Pt is compliant with HEP.     OBJECTIVE: 19    Strength testing Deferred    PROM:  Flex: 115 deg   ER: 40 deg  Abd: 105 deg    RESTRICTIONS/PRECAUTIONS: HTN, Fibromyalgia, Depression, Anxiety, Parkinson's Disease, MI, CAD, Type II Diabetes (on insulin),  PROTOCOL IN MEDIA       SURGERY DATE: 3/1/19    Exercises/Interventions:   Therapeutic Exercise x 30' Resistance / level Sets/sec Reps Notes   Shrugs    30 Added      Scap retraction  Green band  3 10 HEP cueing to minimize shrugging  ^5/10 Held resistance band  resumed  cueing for scapular retraction/plane of movement   Pendulums    X 20 HEP   Forward/back and circular   ER with wand/AAROM   5\" 10 HEP cueing for plane of movement for proper ROM    Semireclined AAROM flex with wand    x10 HEP WAND added 4/23    Abduction/ER isometrics   5\" 2 10 Added 4/5 ^ sets 5/14 Cueing for proper planar motion     Added back in 4/16  Increased sets 4/30   Table Slides  Flex 5\" 2 10 Added 4/9 cueing for going into end range   Pulleys FE  5\" 2 10 Added 4/16   Cued for reducing shoulder shrug   Bicep Curls  2lb 2 10 Added 4/23 ^ 5/14 cueing for full ROM, and elbow positioning   IR towel npv                           Neuromuscular Re-ed / Therapeutic Activities                                                         Manual Intervention x 10'       Shld /GH Mobs       Post Cap mobs       Thoracic/Rib manipualtion       STM (R) upper trap, anterior and r 5'     PROM MT Gentle Flex, ER,  abd 5'  Within protocol ROM limits              Therapeutic Exercise and NMR EXR  X  (07151) Provided verbal/tactile cueing for activities related to strengthening, flexibility, endurance, ROM  for improvements in scapular, scapulothoracic and UE control with self care, reaching, carrying, lifting, house/yardwork, driving/computer work. ? (04803) Provided verbal/tactile cueing for activities related to improving balance, coordination, kinesthetic sense, posture, motor skill, proprioception  to assist with  scapular, scapulothoracic and UE control with self care, reaching, carrying, lifting, house/yardwork, driving/computer work. Therapeutic Activities:      (07173 or 21761) Provided verbal/tactile cueing for activities related to improving balance, coordination, kinesthetic sense, posture, motor skill, proprioception and motor activation to allow for proper function of scapular, scapulothoracic and UE control with self care, carrying, lifting, driving/computer work.      Home Exercise Program:    ? (70033) Reviewed/Progressed HEP activities related to strengthening, flexibility, endurance, ROM of scapular, scapulothoracic and UE control with self care, reaching, carrying, lifting, house/yardwork, driving/computer work  ? (37338) Reviewed/Progressed HEP activities related to improving balance, coordination, kinesthetic sense, posture, motor skill, proprioception of scapular, scapulothoracic and UE control with self care, reaching, carrying, lifting, house/yardwork, driving/computer work      Manual Treatments:  PROM / STM / Oscillations-Mobs:  G-I, II, III, IV (PA's, Inf., Post.)  X (31661) Provided manual therapy to mobilize soft tissue/joints of cervical/CT, scapular GHJ and UE for the purpose of modulating pain, promoting relaxation,  increasing ROM, reducing/eliminating soft tissue swelling/inflammation/restriction, improving soft tissue extensibility and allowing for proper ROM for normal function with self care, reaching, carrying, lifting, house/yardwork, driving/computer work    Modalities: deferred (pt using ice at home)    Charges:  Timed Code Treatment Minutes: 40'   Total Treatment Minutes: 60'        EVAL - LOW (08644)   ? EVAL - MOD (80220)  ? EVAL - HIGH (51017)  ? RE-EVAL (82943)  X LF(31758) x 2  ? IONTO  ? NMR (52426)     ? VASO  X Manual (69589) x 1   ? Other:  ? TA       ? Mech Traction (69367)  ? ES(attended) (31454)     ? ES (un) (11185):     GOALS: 5/23/19  Patient stated goal: daily use of arm    Therapist goals for Patient:   Short Term Goals: To be achieved in: 2 weeks  1. Independent in HEP and progression per patient tolerance, in order to prevent re-injury. MET  2. Patient will have a decrease in pain to facilitate improvement in movement, function, and ADLs as indicated by Functional Deficits. MET    Long Term Goals: To be achieved in: 12 weeks  1. Disability index score of 30% or less for the UEFI or Quick DASH to assist with reaching prior level of function. Progress towards goal  2.  Patient will demonstrate increased AROM to 135 deg or > (R) flex/abd to allow for proper joint functioning as indicated by patients Functional Deficits. AROM not yet assessed  3. Patient will demonstrate an increase in Strength to 4/5 or > (R) IR, and flex to allow for proper functional mobility as indicated by patients Functional Deficits. Strength not yet assessed  4. Patient will return to functional activities including light to moderate housework activities without increased symptoms or restriction. Progress towards goal    Progression Towards Functional goals:  ? Patient is progressing as expected towards functional goals listed. x Progression is slowed due to complexities listed. Recent fall  ? Progression has been slowed due to co-morbidities. Plan just implemented, too soon to assess goals progression  ? Other:     Persisting Functional Limitations/Impairments:  ?Sitting ? Standing   ? Walking ? Squatting/bending    ? Stairs X ADL's    xTransfers X Reaching  X Housework ? Job related tasks  ? Driving X Sports/Recreation   ? Other:    ASSESSMENT:  Pt continues to demonstrate reduced ROM and strength due to pain and healing from surgical procedure. Pt compliant with HEP and tolerant of exercises. Pt continues to require cueing for shoulder positioning (reduce shrugging) during exercises. Pt's pain is fluctuating between visits and needs to be controlled to further be able to progress exercises and ROM. Pt will continue to benefit from formal physical therapy to increase ROM, Strength, and shoulder function following her surgery. Treatment/Activity Tolerance:  X Patient tolerated treatment well ? Patient limited by fatique    Patient limited by pain  ? Patient limited by other medical complications  ? Other:     Prognosis: ? Good X Fair  ? Poor    Patient Requires Follow-up: ? Yes  ? No    Return to Play:    ?  N/A   ? Stage 1: Intro to Strength   ? Stage 2: Dynamic Strength and Intro to Plyometrics   ?   Stage 3: Advanced Plyometrics and Intro to

## 2019-06-04 ENCOUNTER — APPOINTMENT (OUTPATIENT)
Dept: PHYSICAL THERAPY | Age: 64
End: 2019-06-04
Payer: COMMERCIAL

## 2019-06-05 ENCOUNTER — OFFICE VISIT (OUTPATIENT)
Dept: ORTHOPEDIC SURGERY | Age: 64
End: 2019-06-05
Payer: COMMERCIAL

## 2019-06-05 VITALS
HEIGHT: 61 IN | HEART RATE: 84 BPM | SYSTOLIC BLOOD PRESSURE: 160 MMHG | BODY MASS INDEX: 25.47 KG/M2 | DIASTOLIC BLOOD PRESSURE: 75 MMHG | WEIGHT: 134.92 LBS

## 2019-06-05 DIAGNOSIS — Z96.611 S/P REVERSE TOTAL SHOULDER ARTHROPLASTY, RIGHT: Primary | ICD-10-CM

## 2019-06-05 PROCEDURE — G8598 ASA/ANTIPLAT THER USED: HCPCS | Performed by: ORTHOPAEDIC SURGERY

## 2019-06-05 PROCEDURE — G8427 DOCREV CUR MEDS BY ELIG CLIN: HCPCS | Performed by: ORTHOPAEDIC SURGERY

## 2019-06-05 PROCEDURE — 99213 OFFICE O/P EST LOW 20 MIN: CPT | Performed by: ORTHOPAEDIC SURGERY

## 2019-06-05 PROCEDURE — 1036F TOBACCO NON-USER: CPT | Performed by: ORTHOPAEDIC SURGERY

## 2019-06-05 PROCEDURE — G8419 CALC BMI OUT NRM PARAM NOF/U: HCPCS | Performed by: ORTHOPAEDIC SURGERY

## 2019-06-05 PROCEDURE — 3017F COLORECTAL CA SCREEN DOC REV: CPT | Performed by: ORTHOPAEDIC SURGERY

## 2019-06-05 NOTE — LETTER
Physical Therapy Rehabilitation Referral    Patient Name:  Lia Lee      YOB: 1955    Diagnosis:    1. S/P reverse total shoulder arthroplasty, right    DOS:3/1/19    Precautions:  scapularis     [x] Evaluate and Treat    Post Op Instructions:  [] Continuous passive motion (CPM ) [x] Elbow ROM  [x] Exercise in plane of scapula  []  Strengthening     [] Pulley and instruction   [x] Home exercise program (copy to patient)   [] Sling when arm at risk  [] Sling or brace at all times   [x] AAROM: Forward elevation to  140            [x] AAROM: External rotation to  40    [x] Isometric external rotator strengthening [x] AAROM: internal rotation: up the back at       6 weeks post op  [x] Isometric abductor strengthening  [x] AAROM: Internal abduction   [] Isometric internal rotator strengthening [] AAROM: cross-body adduction             Stretching:     Strengthening:  [] Four quadrant (FE, ER, IR, CBA)  [] Rotator cuff (ER, IR, Abd)  [x] Forward Elevation    [x] External Rotators     [x] External Rotation    [] Internal Rotators  [] Internal Rotation: up/back   [] Abductors     [] Internal Rotation: supine in abduction  [] Sleeper Stretch    [] Flexors  [] Cross-body abduction    [] Extensors  [] Pendulum (FE, Abd/Add, cw/ccw)  [x] Scapular Stabilizers   [] Wall-walking (FE, Abd)        [x] Shoulder shrugs     [] Table slides (FE)                [x] Rhomboid pinch  [] Elbow (flex, ext, pron, sup)        [] Lat.  Pull downs     [] Medial epicondylitis program       [] Forward punch   [] Lateral epicondylitis program       [] Internal rotators     [] Progressive resistive exercises  [] Bench Press        [] Bench press plus  Activities:     [] Lateral pull-downs  [] Rowing     [x] Progressive two-hand supine press  [] Stepper/Exercise bike   [x] Biceps/tricep: curls/supination   [] Swimming  [] Water exercises    Modalities:     Return to Sport:  [x] Of Choice      [] Plyometrics [] Ultrasound     [] Rhythmic stabilization  [] Iontophoresis    [] Core strengthening   [] Moist heat     [] Sports specific program:   [] Massage         [x] Cryotherapy      [] Electrical stimulation     [] Paraffin  [] Whirlpool  [] TENS    [x] Home exercise program (copy to patient). Perform exercises for:   15     minutes    3      times/day  [x] Supervised physical therapy  Frequency: []  1x week  [x] 2x week  [] 3x week  [] Other:   Duration: [] 2 weeks   [] 4 weeks  [x] 6 weeks  [] Other:     Additional Instructions:        Shiv Turner MD, PhD

## 2019-06-05 NOTE — PROGRESS NOTES
History of Present Illness:  Jarrett Payne is a pleasant, 61 y.o., female, here today for follow up of right shoulder. She is 3 months out following a right reverse total shoulder arthrplasty on 3/1/19. She has continued in physical therapy at [facility] working with [facility]. She reports falling a few weeks ago but she did not fall on her right shoulder. Her symptoms have finally started to resolve the past few days. Medical History:  Patient's medications, allergies, past medical, surgical, social and family histories were reviewed and updated as appropriate. Review of Systems  A 14 point review of systems was completed by the patient on 10/16/18 and is available in the media section of the scanned medical record and was reviewed on 6/5/2019. The review is negative with the exception of those things mentioned in the HPI and Past Medical History    Vital Signs:  Vitals:    06/05/19 1021   BP: (!) 160/75   Pulse: 84       General/Appearance: Alert and oriented and in no apparent distress. Skin:  There are no skin lesions, cellulitis, or extreme edema. The patient has warm and well-perfused Bilateral upper extremities with brisk capillary refill. RIGHT Shoulder Exam:  Inspection: Incision is healing well. No gross deformities, no signs of infection. Palpation: No crepitation with movement    Active Range of Motion: Forward Elevation 60, Abduction 50, Internal Rotation back pocket    Passive Range of Motion: Forward Elevation 80 with scapulothoracic compensation, External Rotation 15    Strength: Deferred    Special Tests:  Negative Lag No Vargas muscle deformity. Neurovascular: Sensation to light touch is intact, no motor deficits, palpable radial pulses 2+        Radiology:     No new XR obtained at this time. Assessment :  Ms. Jarrett Payne is a pleasant, 61 y.o. patient who is 3 months out following a right reverse total shoulder arthrplasty on 3/1/19.  Her range of motion is

## 2019-06-06 ENCOUNTER — APPOINTMENT (OUTPATIENT)
Dept: PHYSICAL THERAPY | Age: 64
End: 2019-06-06
Payer: COMMERCIAL

## 2019-06-10 ENCOUNTER — HOSPITAL ENCOUNTER (OUTPATIENT)
Dept: PHYSICAL THERAPY | Age: 64
Setting detail: THERAPIES SERIES
Discharge: HOME | End: 2019-06-10
Payer: COMMERCIAL

## 2019-06-10 NOTE — FLOWSHEET NOTE
[address] [facility]    Physical Therapy  Cancellation/No-show Note  Patient Name:  Miya Lester  :  1955   Date:  6/10/2019  Cancelled visits to date: 0  No-shows to date: 3    For today's appointment patient:  [x]  Cancelled  []  Rescheduled appointment  []  No-show     Reason given by patient:  []  Patient ill  [x]  Conflicting appointment with M.D.   []  No transportation    []  Conflict with work  []  No reason given  []  Other:    Comments:      Electronically signed by:  Himanshu Flores PT, DPT

## 2019-06-13 ENCOUNTER — HOSPITAL ENCOUNTER (OUTPATIENT)
Dept: PHYSICAL THERAPY | Age: 64
Setting detail: THERAPIES SERIES
Discharge: HOME | End: 2019-06-13
Payer: COMMERCIAL

## 2019-06-13 NOTE — FLOWSHEET NOTE
[address] [facility]    Physical Therapy  Cancellation/No-show Note  Patient Name:  Donita Meadows  :  1955   Date:  2019  Cancelled visits to date: 1  No-shows to date: 3    For today's appointment patient:  [x]  Cancelled  []  Rescheduled appointment  []  No-show     Reason given by patient:  []  Patient ill  []  Conflicting appointment with M.D. []  No transportation    []  Conflict with work  []  No reason given  [x]  Other: Pt called office and states she is back on oxygen supplementation.    Comments:      Electronically signed by:  Jaime Clarke, PT, DPT

## 2019-06-18 ENCOUNTER — HOSPITAL ENCOUNTER (OUTPATIENT)
Dept: PHYSICAL THERAPY | Age: 64
Setting detail: THERAPIES SERIES
Discharge: HOME | End: 2019-06-18
Payer: COMMERCIAL

## 2019-06-18 PROCEDURE — 97110 THERAPEUTIC EXERCISES: CPT

## 2019-06-18 PROCEDURE — 97140 MANUAL THERAPY 1/> REGIONS: CPT

## 2019-06-18 NOTE — FLOWSHEET NOTE
[address] [facility]     Physical Therapy Daily Treatment Note    Date:  2019    Patient Name:  Jason Edwards    :  1955  MRN: [id number]  Medical/Treatment Diagnosis Information:  Diagnosis: V68.753 s/P Reverse TSA (R)  Treatment Diagnosis: M25.511 Pain in (R) Shoulder, decreased ROM, strength  Insurance/Certification information:  PT Insurance Information: [employer] Medicare  Physician Information:  Referring Practitioner: Dr. Armando Valencia of care signed (Y/N): y    Date of Patient follow up with Physician: 19    Functional score:      Date functional scale completed  19  PT G-Codes  Functional Assessment Tool Used: Darinel Taveras  Score: 48% LOF    Progress Note:   Yes   X  No  Next due by: Visit #20    Latex Allergy:  ?NO      ? YES  Preferred Language for Healthcare:   ?English       ? other:    Visit # Insurance Allowable Date Range   12 Eval + 30 up to $2010 N/A     Pain level: 0/10 currently in shoulder,      SUBJECTIVE:  Pt states that she has missed some appts due to SOB and inability to maintain O2 saturation without supplemental O2. She stated that she has an episode of SOB that put her on the ground outside while walking her dog. Her neighbor noticed her down and helped her to her apartment room after getting her inhaler for her. She had relief with the inhaler. Now she is off the O2 and has had no more episodes. Pt educated to keep inhaler on hand and supplemental O2 closeby for safety. Pt understood and has been doing so since the incident. Her low back/hip on the L side has been painful and inflamed lately so compliance with HEP and attendance to therapy has been scarce. Pt still wearing pain patch and states that it is the biggest pain reliever. Pt states that her shoulder pain has been much better recently.      OBJECTIVE: 19    Strength testing Deferred    PROM:  Flex:  106  ER: 24  Abd: 90    RESTRICTIONS/PRECAUTIONS: HTN, Fibromyalgia, Depression, Anxiety, Parkinson's Disease, MI, CAD, Type II Diabetes (on insulin),  PROTOCOL IN MEDIA       SURGERY DATE: 3/1/19    Exercises/Interventions:   Therapeutic Exercise x 43' Resistance / level Sets/sec Reps Notes   Scap retraction  Green band  3 10 HEP cueing to minimize shrugging  ^5/10 Held resistance band 5/17 resumed 5/23 cueing for scapular retraction/plane of movement   ER with wand/AAROM   5\" 10 HEP cueing for plane of movement for proper ROM    Semireclined AAROM flex with wand     x10 HEP WAND added 4/23    Abduction/ER isometrics   5\" 2 10 Added 4/5 ^ sets 5/14 Cueing for proper planar motion     Added back in 4/16  Increased sets 4/30   Table Slides x Flex 5\" 2 10 Added 4/9 cueing for going into end range   Pulleys FE(Add Abd NPV)  5\" 2 10 Added 4/16   Cued for reducing shoulder shrug. Bicep Curls  2lb 2 10 Added 4/23 ^ 5/14 cueing for full ROM, and elbow positioning   IR towel    3\" 2 10 Added 6/18   Tricep extension  green 2 10 Added 6/18 Cued for elbow position                 Neuromuscular Re-ed / Therapeutic Activities                                                         Manual Intervention x 12'       Shld /GH Mobs       Post Cap mobs       Thoracic/Rib manipulation       STM (R) upper trap, anterior and r '     PROM MT Gentle Flex, ER,  abd 12'  Within protocol ROM limits              Therapeutic Exercise and NMR EXR  X  (34892) Provided verbal/tactile cueing for activities related to strengthening, flexibility, endurance, ROM  for improvements in scapular, scapulothoracic and UE control with self care, reaching, carrying, lifting, house/yardwork, driving/computer work.     ? (53452) Provided verbal/tactile cueing for activities related to improving balance, coordination, kinesthetic sense, posture, motor skill, proprioception  to assist with  scapular, scapulothoracic and UE control with self care, reaching, carrying, lifting, house/yardwork, driving/computer work.    Therapeutic Activities:      (03607 or 44402) Provided verbal/tactile cueing for activities related to improving balance, coordination, kinesthetic sense, posture, motor skill, proprioception and motor activation to allow for proper function of scapular, scapulothoracic and UE control with self care, carrying, lifting, driving/computer work. Home Exercise Program:    ? (80347) Reviewed/Progressed HEP activities related to strengthening, flexibility, endurance, ROM of scapular, scapulothoracic and UE control with self care, reaching, carrying, lifting, house/yardwork, driving/computer work  ? (23224) Reviewed/Progressed HEP activities related to improving balance, coordination, kinesthetic sense, posture, motor skill, proprioception of scapular, scapulothoracic and UE control with self care, reaching, carrying, lifting, house/yardwork, driving/computer work      Manual Treatments:  PROM / STM / Oscillations-Mobs:  G-I, II, III, IV (PA's, Inf., Post.)  X ([id number]) Provided manual therapy to mobilize soft tissue/joints of cervical/CT, scapular GHJ and UE for the purpose of modulating pain, promoting relaxation,  increasing ROM, reducing/eliminating soft tissue swelling/inflammation/restriction, improving soft tissue extensibility and allowing for proper ROM for normal function with self care, reaching, carrying, lifting, house/yardwork, driving/computer work    Modalities: deferred (pt using ice at home)    Charges:  Timed Code Treatment Minutes: 54'   Total Treatment Minutes: 80'        EVAL - LOW (05159)   ? EVAL - MOD (11028)  ? EVAL - HIGH (64501)  ? RE-EVAL (19884)  X EQ(35773) x 3  ? IONTO  ? NMR (82298)     ? VASO  X Manual (73783) x 1   ? Other:  ? TA       ? Mech Traction (70746)  ? ES(attended) (06843)     ? ES (un) (84101):     GOALS: 5/23/19  Patient stated goal: daily use of arm    Therapist goals for Patient:   Short Term Goals: To be achieved in: 2 weeks  1.  Independent in HEP and progression per patient tolerance, in order to prevent re-injury. MET  2. Patient will have a decrease in pain to facilitate improvement in movement, function, and ADLs as indicated by Functional Deficits. MET    Long Term Goals: To be achieved in: 12 weeks  1. Disability index score of 30% or less for the UEFI or Quick DASH to assist with reaching prior level of function. Progress towards goal  2. Patient will demonstrate increased AROM to 135 deg or > (R) flex/abd to allow for proper joint functioning as indicated by patients Functional Deficits. AROM not yet assessed  3. Patient will demonstrate an increase in Strength to 4/5 or > (R) IR, and flex to allow for proper functional mobility as indicated by patients Functional Deficits. Strength not yet assessed  4. Patient will return to functional activities including light to moderate housework activities without increased symptoms or restriction. Progress towards goal    Progression Towards Functional goals:  ? Patient is progressing as expected towards functional goals listed. x Progression is slowed due to complexities listed. Recent fall  ? Progression has been slowed due to co-morbidities. Plan just implemented, too soon to assess goals progression  ? Other:     Persisting Functional Limitations/Impairments:  ?Sitting ? Standing   ? Walking ? Squatting/bending    ? Stairs X ADL's    xTransfers X Reaching  X Housework ? Job related tasks  ? Driving X Sports/Recreation   ? Other:    ASSESSMENT:  Pt continues to demonstrate reduced ROM and strength due to pain and healing from surgical procedure. Pt is not compliant with HEP, but tolerant of exercises here in the clinic. Pt continues to require cueing for shoulder positioning (reduce shrugging) during exercises. Pt's pain is better controlled in the (R) shoulder. Pt has been having SOB issues recently and has been advised to check in with her doctor.  Pt stated that her blood sugar dropped towards the end of therapy this date.  Pt did have a sugar pill on hand and felt fine after taking one. Pt will continue to benefit from formal physical therapy to increase ROM, Strength, and shoulder function following her surgery. However, pt needs to improve upon her compliance to the provided HEP in order to truly make significant gains in the R shoulder. Treatment/Activity Tolerance:  X Patient tolerated treatment well ? Patient limited by fatique    Patient limited by pain  ? Patient limited by other medical complications  ? Other:     Prognosis: ? Good X Fair  ? Poor    Patient Requires Follow-up: ? Yes  ? No    Return to Play:    ?  N/A   ? Stage 1: Intro to Strength   ? Stage 2: Dynamic Strength and Intro to Plyometrics   ? Stage 3: Advanced Plyometrics and Intro to Throwing   ? Stage 4: Sport specific Training/Return to Sport   ? Ready to Return to Play, Meets All Above Stages   ? Not Ready for Return to Sports   Comments:      PLAN:   Progress per protocol, emphasize mobility  x Continue per plan of care ? Alter current plan (see comments)   Plan of care initiated ? Hold pending MD visit ? Discharge    Electronically signed by: Ilan Wick PT, DPGIGI Vivas  Therapist was present, directed the patient's care, made skilled judgement, and was responsible for assessment and treatment of the patient.

## 2019-06-20 ENCOUNTER — HOSPITAL ENCOUNTER (OUTPATIENT)
Dept: PHYSICAL THERAPY | Age: 64
Setting detail: THERAPIES SERIES
Discharge: HOME | End: 2019-06-20
Payer: COMMERCIAL

## 2019-06-20 PROCEDURE — 97140 MANUAL THERAPY 1/> REGIONS: CPT

## 2019-06-20 PROCEDURE — 97110 THERAPEUTIC EXERCISES: CPT

## 2019-06-20 NOTE — FLOWSHEET NOTE
[address] [facility]     Physical Therapy Daily Treatment Note    Date:  2019    Patient Name:  Arian May    :  1955  MRN: [id number]  Medical/Treatment Diagnosis Information:  Diagnosis: E30.353 s/P Reverse TSA (R)  Treatment Diagnosis: M25.511 Pain in (R) Shoulder, decreased ROM, strength  Insurance/Certification information:  PT Insurance Information: [employer] Destine Medicare  Physician Information:  Referring Practitioner: Dr. Ellen Prabhakar of care signed (Y/N): y    Date of Patient follow up with Physician: 19    Functional score:      Date functional scale completed  19  PT G-Codes  Functional Assessment Tool Used: Rusty Mathis  Score: 48% LOF    Progress Note:   Yes   X  No  Next due by: Visit #20    Latex Allergy:  ?NO      ? YES  Preferred Language for Healthcare:   ?English       ? other:    Visit # Insurance Allowable Date Range   13 Eval + 30 up to $2010 N/A     Pain level: 5/10 currently in shoulder,      SUBJECTIVE:  Pt states that she was sore after her last visit. Pt states this lasted for a day and that it has been better since. Pt still notes some soreness in the anterior shoulder. Pt states she feels stiff all over.     OBJECTIVE: 19    Strength testing Deferred    PROM:  Flex:  106  Er @ 45 de deg  Abd: 100 deg    RESTRICTIONS/PRECAUTIONS: HTN, Fibromyalgia, Depression, Anxiety, Parkinson's Disease, MI, CAD, Type II Diabetes (on insulin),  PROTOCOL IN MEDIA       SURGERY DATE: 3/1/19    Exercises/Interventions:   Therapeutic Exercise x 30' Resistance / level Sets/sec Reps Notes   Scap retraction  Green band  3 10 HEP cueing to minimize shrugging  ^5/10 Held resistance band  resumed  cueing for scapular retraction/plane of movement   ER with wand/AAROM   5\" 10 HEP cueing for plane of movement for proper ROM  Held   Abduction/ER isometrics   5\" 2 10 Added  ^ sets  Cueing for proper planar motion Held Added back in 4/16  Increased sets 4/30   Table Slides x Flex and abd 5\" 1 10 Added 4/9 cueing for going into end range   Pulleys FE and abd 5\" 1 10 Added 4/16 Abd added 6/20  Cued for reducing shoulder shrug. Bicep Curls  2lb 3 10 Added 4/23 ^ 5/14 cueing for full ROM, and elbow positioning   IR towel    3\" 2 10 Added 6/18   Tricep extension  green 3 10 Added 6/18 Cued for elbow position   Supine AROM  2 10 Added 6/20          Neuromuscular Re-ed / Therapeutic Activities                                                         Manual Intervention x 14'       Shld /GH Mobs       Post Cap mobs       Thoracic/Rib manipulation       STM  '     PROM MT Gentle Flex, ER,  abd 14'  Within protocol ROM limits              Therapeutic Exercise and NMR EXR  X  (45741) Provided verbal/tactile cueing for activities related to strengthening, flexibility, endurance, ROM  for improvements in scapular, scapulothoracic and UE control with self care, reaching, carrying, lifting, house/yardwork, driving/computer work. ? (27828) Provided verbal/tactile cueing for activities related to improving balance, coordination, kinesthetic sense, posture, motor skill, proprioception  to assist with  scapular, scapulothoracic and UE control with self care, reaching, carrying, lifting, house/yardwork, driving/computer work. Therapeutic Activities:      (26032 or 69753) Provided verbal/tactile cueing for activities related to improving balance, coordination, kinesthetic sense, posture, motor skill, proprioception and motor activation to allow for proper function of scapular, scapulothoracic and UE control with self care, carrying, lifting, driving/computer work.      Home Exercise Program:    ? (71703) Reviewed/Progressed HEP activities related to strengthening, flexibility, endurance, ROM of scapular, scapulothoracic and UE control with self care, reaching, carrying, lifting, house/yardwork, driving/computer work  ? (13605) Reviewed/Progressed HEP activities related to improving balance, coordination, kinesthetic sense, posture, motor skill, proprioception of scapular, scapulothoracic and UE control with self care, reaching, carrying, lifting, house/yardwork, driving/computer work      Manual Treatments:  PROM / STM / Oscillations-Mobs:  G-I, II, III, IV (PA's, Inf., Post.)  X (09169) Provided manual therapy to mobilize soft tissue/joints of cervical/CT, scapular GHJ and UE for the purpose of modulating pain, promoting relaxation,  increasing ROM, reducing/eliminating soft tissue swelling/inflammation/restriction, improving soft tissue extensibility and allowing for proper ROM for normal function with self care, reaching, carrying, lifting, house/yardwork, driving/computer work    Modalities: deferred (pt using ice at home)    Charges:  Timed Code Treatment Minutes: 44'   Total Treatment Minutes: 58'        EVAL - LOW (34620)   ? EVAL - MOD (28944)  ? EVAL - HIGH (59590)  ? RE-EVAL (50376)  X KK(24553) x 2  ? IONTO  ? NMR (32299)     ? VASO  X Manual (69024) x 1   ? Other:  ? TA       ? Mech Traction (75397)  ? ES(attended) (70875)     ? ES (un) (00764):     GOALS: 5/23/19  Patient stated goal: daily use of arm    Therapist goals for Patient:   Short Term Goals: To be achieved in: 2 weeks  1. Independent in HEP and progression per patient tolerance, in order to prevent re-injury. MET  2. Patient will have a decrease in pain to facilitate improvement in movement, function, and ADLs as indicated by Functional Deficits. MET    Long Term Goals: To be achieved in: 12 weeks  1. Disability index score of 30% or less for the UEFI or Quick DASH to assist with reaching prior level of function. Progress towards goal  2. Patient will demonstrate increased AROM to 135 deg or > (R) flex/abd to allow for proper joint functioning as indicated by patients Functional Deficits. AROM not yet assessed  3.  Patient will demonstrate an increase in Strength to 4/5 or > (R) IR, and flex to allow for proper functional mobility as indicated by patients Functional Deficits. Strength not yet assessed  4. Patient will return to functional activities including light to moderate housework activities without increased symptoms or restriction. Progress towards goal    Progression Towards Functional goals:  ? Patient is progressing as expected towards functional goals listed. x Progression is slowed due to complexities listed. Recent fall  ? Progression has been slowed due to co-morbidities. Plan just implemented, too soon to assess goals progression  ? Other:     Persisting Functional Limitations/Impairments:  ?Sitting ? Standing   ? Walking ? Squatting/bending    ? Stairs X ADL's    xTransfers X Reaching  X Housework ? Job related tasks  ? Driving X Sports/Recreation   ? Other:    ASSESSMENT:  Pt demonstrated modest gains in (R) shoulder ROM compared to the last visit. Introduced supine AROM FE of the (R) shoulder this date. Pt was more limited due to fatigue versus pain. Encouraged pt to continue working regularly on the prescribed HEP. Pt continues to be limited in part due to other co-morbidities including Parkinson's, Type II Diabetes, and chronic global pain. Treatment/Activity Tolerance:  X Patient tolerated treatment well ? Patient limited by fatique    Patient limited by pain  ? Patient limited by other medical complications  ? Other:     Prognosis: ? Good X Fair  ? Poor    Patient Requires Follow-up: ? Yes  ? No    Return to Play:    ?  N/A   ? Stage 1: Intro to Strength   ? Stage 2: Dynamic Strength and Intro to Plyometrics   ? Stage 3: Advanced Plyometrics and Intro to Throwing   ? Stage 4: Sport specific Training/Return to Sport   ? Ready to Return to Play, Meets All Above Stages   ? Not Ready for Return to Sports   Comments:      PLAN:   Progress per protocol, emphasize mobility  x Continue per plan of care ?  Alter current plan (see comments)   Plan of care initiated ? Hold pending MD visit ?  Discharge    Electronically signed by: Costa Valdez PT, DPT

## 2019-06-25 ENCOUNTER — HOSPITAL ENCOUNTER (OUTPATIENT)
Dept: PHYSICAL THERAPY | Age: 64
Setting detail: THERAPIES SERIES
Discharge: HOME | End: 2019-06-25
Payer: COMMERCIAL

## 2019-06-25 NOTE — FLOWSHEET NOTE
[address] [facility]    Physical Therapy  Cancellation/No-show Note  Patient Name:  Jose Jones  :  1955   Date:  2019  Cancelled visits to date: 2  No-shows to date: 4    For today's appointment patient:  [x]  Cancelled    []  Rescheduled appointment  []  No-show     Reason given by patient:  []  Patient ill  []  Conflicting appointment with M.D. []  No transportation    []  Conflict with work  []  No reason given  [x]  Other:    Comments:  Pt came in reporting fall last night onto post-surgical shoulder while potentially sleep walking vs hypoglycemic event. Pt reported that her blood sugar was very low during the incident but she cannot recall much more. She was found by her apartment management and her aid on the floor of her apartment. Ambulance was called and she was taken to the ER. X-rays were taken and no fx or displacement was seen. Pt states pain in the tailbone as well as (R) shoulder. Therapy held this date in order to allow patient adequate rest.  Pt will be re-assessed at her next visit .    Electronically signed by:  Dheeraj Hill, PT, DPT

## 2019-06-28 ENCOUNTER — HOSPITAL ENCOUNTER (OUTPATIENT)
Dept: PHYSICAL THERAPY | Age: 64
Setting detail: THERAPIES SERIES
Discharge: HOME | End: 2019-06-28
Payer: COMMERCIAL

## 2019-06-28 PROCEDURE — 97140 MANUAL THERAPY 1/> REGIONS: CPT

## 2019-06-28 PROCEDURE — 97110 THERAPEUTIC EXERCISES: CPT

## 2019-06-28 NOTE — FLOWSHEET NOTE
[address] [facility]     Physical Therapy Daily Treatment Note    Date:  2019    Patient Name:  Fanta Mike    :  1955  MRN: [id number]  Medical/Treatment Diagnosis Information:  Diagnosis: E13.554 s/P Reverse TSA (R)  Treatment Diagnosis: M25.511 Pain in (R) Shoulder, decreased ROM, strength  Insurance/Certification information:  PT Insurance Information: [employer] Medicare  Physician Information:  Referring Practitioner: Dr. Nancy Driscoll of care signed (Y/N): y    Date of Patient follow up with Physician: 19    Functional score:      Date functional scale completed  19  PT G-Codes  Functional Assessment Tool Used: Linwood Recinos  Score: 48% LOF    Progress Note:   Yes   X  No  Next due by: Visit #20    Latex Allergy:  ?NO      ? YES  Preferred Language for Healthcare:   ?English       ? other:    Visit # Insurance Allowable Date Range   14 Eval + 30 up to $2010 N/A     Pain level: 5/10 currently in shoulder,      SUBJECTIVE:  Shoulder is still sore from recent fall last week. Pt reports \"I'm trying to\" with regard to performing HEP.      OBJECTIVE: 19    Strength testing Deferred    PROM:  Flex:  106  Er @ 45 de deg  Abd: 100 deg    RESTRICTIONS/PRECAUTIONS: HTN, Fibromyalgia, Depression, Anxiety, Parkinson's Disease, MI, CAD, Type II Diabetes (on insulin),  PROTOCOL IN MEDIA       SURGERY DATE: 3/1/19    Exercises/Interventions:   Therapeutic Exercise  Resistance / level Sets/sec Reps Notes   Shrugs 15 Added   Held     Green band  3 10 HEP cueing to minimize shrugging  ^5/10 Held resistance band  resumed  cueing for scapular retraction/plane of movement   Held  due to pain     5\" 10 HEP cueing for plane of movement for proper ROM  Held  due to pain     5\" 2 10 Added / ^ sets  Cueing for proper planar motion Held  due to pain     Added back in   Increased sets     Flex and abd 5\" 1 10 Added activities related to improving balance, coordination, kinesthetic sense, posture, motor skill, proprioception of scapular, scapulothoracic and UE control with self care, reaching, carrying, lifting, house/yardwork, driving/computer work      Manual Treatments:  PROM / STM / Oscillations-Mobs:  G-I, II, III, IV (PA's, Inf., Post.)  X (34742) Provided manual therapy to mobilize soft tissue/joints of cervical/CT, scapular GHJ and UE for the purpose of modulating pain, promoting relaxation,  increasing ROM, reducing/eliminating soft tissue swelling/inflammation/restriction, improving soft tissue extensibility and allowing for proper ROM for normal function with self care, reaching, carrying, lifting, house/yardwork, driving/computer work    Modalities: deferred (pt using ice at home)    Charges:  Timed Code Treatment Minutes: 30   Total Treatment Minutes: 40        EVAL - LOW (08450)   ? EVAL - MOD (21883)  ? EVAL - HIGH (00655)  ? RE-EVAL (93840)  X SF(02982) x 1  ? IONTO  ? NMR (29291)     ? VASO  X Manual (90108) x 1   ? Other:  ? TA       ? Mech Traction (63748)  ? ES(attended) (41458)     ? ES (un) (46319):     GOALS: 5/23/19  Patient stated goal: daily use of arm    Therapist goals for Patient:   Short Term Goals: To be achieved in: 2 weeks  1. Independent in HEP and progression per patient tolerance, in order to prevent re-injury. MET  2. Patient will have a decrease in pain to facilitate improvement in movement, function, and ADLs as indicated by Functional Deficits. MET    Long Term Goals: To be achieved in: 12 weeks  1. Disability index score of 30% or less for the UEFI or Quick DASH to assist with reaching prior level of function. Progress towards goal  2. Patient will demonstrate increased AROM to 135 deg or > (R) flex/abd to allow for proper joint functioning as indicated by patients Functional Deficits. AROM not yet assessed  3.  Patient will demonstrate an increase in Strength to 4/5 or > (R) IR, and flex to allow for proper functional mobility as indicated by patients Functional Deficits. Strength not yet assessed  4. Patient will return to functional activities including light to moderate housework activities without increased symptoms or restriction. Progress towards goal    Progression Towards Functional goals:  ? Patient is progressing as expected towards functional goals listed. x Progression is slowed due to complexities listed. Recent fall  ? Progression has been slowed due to co-morbidities. Plan just implemented, too soon to assess goals progression  ? Other:     Persisting Functional Limitations/Impairments:  ?Sitting ? Standing   ? Walking ? Squatting/bending    ? Stairs X ADL's    xTransfers X Reaching  X Housework ? Job related tasks  ? Driving X Sports/Recreation   ? Other:    ASSESSMENT:  Pt demonstrates limitations in exercise tolerance and functional mobility due to recent fall and continued pain. Pt demonstrates inability to tolerate exercises this date and only performed exercises as noted above on flow sheet. Discussed with pt to perform exercises at home as tolerated for movement and to decrease stiffness in shoulder. Pt's ROM in all motions is severely limited with pain noted and hard end feels/muscle guarding in all directions. Continue to advance motion as tolerated to functional reaching motions for increased ease to perform reaching and self care tasks. Treatment/Activity Tolerance:  X Patient tolerated treatment well ? Patient limited by fatique    Patient limited by pain  ? Patient limited by other medical complications  ? Other:     Prognosis: ? Good X Fair  ? Poor    Patient Requires Follow-up: ? Yes  ? No    Return to Play:    ?  N/A   ? Stage 1: Intro to Strength   ? Stage 2: Dynamic Strength and Intro to Plyometrics   ? Stage 3: Advanced Plyometrics and Intro to Throwing   ? Stage 4: Sport specific Training/Return to Sport   ?   Ready to Return to Play, Meets All Above Stages   ? Not Ready for Return to Sports   Comments:      PLAN:   Progress per protocol, emphasize mobility  x Continue per plan of care ? Alter current plan (see comments)   Plan of care initiated ? Hold pending MD visit ?  Discharge    Electronically signed by: Eliazar [id number]

## 2019-07-02 ENCOUNTER — HOSPITAL ENCOUNTER (OUTPATIENT)
Dept: PHYSICAL THERAPY | Age: 64
Setting detail: THERAPIES SERIES
Discharge: HOME | End: 2019-07-02
Payer: COMMERCIAL

## 2019-07-02 PROCEDURE — 97110 THERAPEUTIC EXERCISES: CPT

## 2019-07-02 PROCEDURE — 97140 MANUAL THERAPY 1/> REGIONS: CPT

## 2019-07-05 ENCOUNTER — HOSPITAL ENCOUNTER (OUTPATIENT)
Dept: PHYSICAL THERAPY | Age: 64
Setting detail: THERAPIES SERIES
Discharge: HOME | End: 2019-07-05
Payer: COMMERCIAL

## 2019-07-11 ENCOUNTER — HOSPITAL ENCOUNTER (OUTPATIENT)
Dept: PHYSICAL THERAPY | Age: 64
Setting detail: THERAPIES SERIES
Discharge: HOME | End: 2019-07-11
Payer: COMMERCIAL

## 2019-07-11 PROCEDURE — 97140 MANUAL THERAPY 1/> REGIONS: CPT

## 2019-07-11 PROCEDURE — 97110 THERAPEUTIC EXERCISES: CPT

## 2019-07-11 NOTE — FLOWSHEET NOTE
[address] [facility]     Physical Therapy Daily Treatment Note    Date:  2019    Patient Name:  Karen Bhakta    :  1955  MRN: [id number]  Medical/Treatment Diagnosis Information:  Diagnosis: V33.527 s/P Reverse TSA (R)  Treatment Diagnosis: M25.511 Pain in (R) Shoulder, decreased ROM, strength  Insurance/Certification information:  PT Insurance Information: [employer]  Physician Information:  Referring Practitioner: Dr. Lamon Klinefelter of care signed (Y/N): y    Date of Patient follow up with Physician: 19    Functional score:      Date functional scale completed  19  PT G-Codes  Functional Assessment Tool Used: Bo Meyer  Score: 48% LOF    Progress Note:   Yes   X  No  Next due by: Visit #20    Latex Allergy:  ?NO      ? YES  Preferred Language for Healthcare:   ?English       ? other:    Visit # Insurance Allowable Date Range   17 Eval + 30 up to $2010 N/A     Pain level: 7/10 currently in shoulder     SUBJECTIVE:  Pt states that after last session she was very sore when she left. Since then she has had increased pain. Pt states that she has been working on her HEP.      OBJECTIVE: 19    Strength testing Deferred    Flex:  [id number]  Er @45 deg: PROM 40  deg  Abd:  PROM 98 deg    RESTRICTIONS/PRECAUTIONS: HTN, Fibromyalgia, Depression, Anxiety, Parkinson's Disease, MI, CAD, Type II Diabetes (on insulin),  PROTOCOL IN MEDIA       SURGERY DATE: 3/1/19    Exercises/Interventions:   Therapeutic Exercise 33' Resistance / level Sets/sec Reps Notes    Green band  3 10 HEP cueing to minimize shrugging  ^5/10 Held resistance band  resumed  cueing for scapular retraction/plane of movement   Held  due to pain   ER with wand/AAROM   5\" 2 20 HEP cueing for plane of movement for proper ROM  Held  due to pain   Semireclined AAROM flex with wand  2x10 Abduction/ER isometrics   5\" 2 ea 10 Added 4/5 ^ sets  Cueing for proper planar motion Held 6/28 due to pain     Added back in 4/16  Increased sets 4/30   Table Slides   abd 5\" 2 10 Added 4/9 cueing for going into end range  Held 6/28 due to pain   Pulleys FE and abd  5\" 1 ea 10 Added 4/16 Abd added 6/20  Cued for reducing shoulder shrug. 2lb 3 10 Added 4/23 ^ 5/14 cueing for full ROM, and elbow positioning   Sidelying ER No wt2 10 Resumed 7/9   3\" 2 10 Added 6/18 Held 7/2 due to recent fall    green 3 10 Added 6/18 Cued for elbow position  Held 6/28 due to pain, and 7/2     2 10 Added 6/20, Held 7/2     2 10 Added 6/28   Semi-reclined AROM flexion   2 10 Added 7/9 cueing to minimize shrugging   IR NPV              Neuromuscular Re-ed / Therapeutic Activities                                                         Manual Intervention 12'       Shld /GH Mobs       Post Cap mobs       Thoracic/Rib manipulation       STM       PROM MT  Flex, ER,  abd 12'  Within protocol ROM limits              Therapeutic Exercise and NMR EXR  X  (32075) Provided verbal/tactile cueing for activities related to strengthening, flexibility, endurance, ROM  for improvements in scapular, scapulothoracic and UE control with self care, reaching, carrying, lifting, house/yardwork, driving/computer work. ? (11058) Provided verbal/tactile cueing for activities related to improving balance, coordination, kinesthetic sense, posture, motor skill, proprioception  to assist with  scapular, scapulothoracic and UE control with self care, reaching, carrying, lifting, house/yardwork, driving/computer work. Therapeutic Activities:      (76769 or 19267) Provided verbal/tactile cueing for activities related to improving balance, coordination, kinesthetic sense, posture, motor skill, proprioception and motor activation to allow for proper function of scapular, scapulothoracic and UE control with self care, carrying, lifting, driving/computer work.      Home Exercise Program:    ? (31000) Reviewed/Progressed HEP activities related to strengthening, flexibility, endurance, ROM of scapular, scapulothoracic and UE control with self care, reaching, carrying, lifting, house/yardwork, driving/computer work  ? (78318) Reviewed/Progressed HEP activities related to improving balance, coordination, kinesthetic sense, posture, motor skill, proprioception of scapular, scapulothoracic and UE control with self care, reaching, carrying, lifting, house/yardwork, driving/computer work      Manual Treatments:  PROM / STM / Oscillations-Mobs:  G-I, II, III, IV (PA's, Inf., Post.)  X ([id number]) Provided manual therapy to mobilize soft tissue/joints of cervical/CT, scapular GHJ and UE for the purpose of modulating pain, promoting relaxation,  increasing ROM, reducing/eliminating soft tissue swelling/inflammation/restriction, improving soft tissue extensibility and allowing for proper ROM for normal function with self care, reaching, carrying, lifting, house/yardwork, driving/computer work    Modalities: deferred (pt using ice at home)    Charges:  Timed Code Treatment Minutes: 45'   Total Treatment Minutes: 60'        EVAL - LOW (23900)   ? EVAL - MOD (84671)  ? EVAL - HIGH (75895)  ? RE-EVAL (23651)  X AC(37416) x 2  ? IONTO  ? NMR (07098)     ? VASO  X Manual (74105) x  1  ? Other:  ? TA       ? Mech Traction (70309)  ? ES(attended) (64550)     ? ES (un) (47233):     GOALS: 5/23/19  Patient stated goal: daily use of arm    Therapist goals for Patient:   Short Term Goals: To be achieved in: 2 weeks  1. Independent in HEP and progression per patient tolerance, in order to prevent re-injury. MET  2. Patient will have a decrease in pain to facilitate improvement in movement, function, and ADLs as indicated by Functional Deficits. MET    Long Term Goals: To be achieved in: 12 weeks  1. Disability index score of 30% or less for the UEFI or Quick DASH to assist with reaching prior level of function. Progress towards goal  2.  Patient will demonstrate increased AROM to 135 deg or > (R) flex/abd to allow for proper joint functioning as indicated by patients Functional Deficits. AROM not yet assessed  3. Patient will demonstrate an increase in Strength to 4/5 or > (R) IR, and flex to allow for proper functional mobility as indicated by patients Functional Deficits. Strength not yet assessed  4. Patient will return to functional activities including light to moderate housework activities without increased symptoms or restriction. Progress towards goal    Progression Towards Functional goals:  ? Patient is progressing as expected towards functional goals listed. x Progression is slowed due to complexities listed. Recent fall  ? Progression has been slowed due to co-morbidities. Plan just implemented, too soon to assess goals progression  ? Other:     Persisting Functional Limitations/Impairments:  ?Sitting ? Standing   ? Walking ? Squatting/bending    ? Stairs X ADL's    xTransfers X Reaching  X Housework ? Job related tasks  ? Driving X Sports/Recreation   ? Other:    ASSESSMENT:  Pt came in sore from last session today but was able to tolerate manual therapy and exercise with minimal pain. ROM is still lacking in flexion, abduction, and ER and big focus on therapy is to gain as much as possible. With manual therapy end ranges have stiff end feels but no pain with ER, minimal pain with abduction, and moderate pain with flexion. Strength training will be progressed as tolerated with first AROM being focused. Pt stated that she felt about the same as when she came in today at the end of her session. Today at end of the 1st set of semi-reclined shoulder flexion with a wand she felt like she was starting to have an asthma attack so she took out her rescue inhaler. Symptoms alleviated immediately once inhaler used. Subscapular AROM/strengthening will be added npv. Pt will continue to benefit from ROM, and strengthening to promote further shoulder function.

## 2019-07-22 ENCOUNTER — HOSPITAL ENCOUNTER (EMERGENCY)
Age: 64
Discharge: HOME | End: 2019-07-22
Attending: EMERGENCY MEDICINE
Payer: COMMERCIAL

## 2019-07-22 ENCOUNTER — APPOINTMENT (OUTPATIENT)
Dept: GENERAL RADIOLOGY | Age: 64
End: 2019-07-22
Payer: COMMERCIAL

## 2019-07-22 VITALS
RESPIRATION RATE: 15 BRPM | HEIGHT: 61 IN | BODY MASS INDEX: 26.24 KG/M2 | WEIGHT: 139 LBS | OXYGEN SATURATION: 96 % | SYSTOLIC BLOOD PRESSURE: 180 MMHG | DIASTOLIC BLOOD PRESSURE: 72 MMHG | TEMPERATURE: 98.8 F | HEART RATE: 88 BPM

## 2019-07-22 DIAGNOSIS — R06.00 DYSPNEA, UNSPECIFIED TYPE: Primary | ICD-10-CM

## 2019-07-22 DIAGNOSIS — E11.65 TYPE 2 DIABETES MELLITUS WITH HYPERGLYCEMIA, WITH LONG-TERM CURRENT USE OF INSULIN (HCC): ICD-10-CM

## 2019-07-22 DIAGNOSIS — I50.9 CHRONIC CONGESTIVE HEART FAILURE, UNSPECIFIED HEART FAILURE TYPE (HCC): ICD-10-CM

## 2019-07-22 DIAGNOSIS — Z79.4 TYPE 2 DIABETES MELLITUS WITH HYPERGLYCEMIA, WITH LONG-TERM CURRENT USE OF INSULIN (HCC): ICD-10-CM

## 2019-07-22 LAB
ANION GAP SERPL CALCULATED.3IONS-SCNC: 11 MMOL/L (ref 3–16)
BASOPHILS ABSOLUTE: 0 K/UL (ref 0–0.2)
BASOPHILS RELATIVE PERCENT: 0.8 %
BUN BLDV-MCNC: 19 MG/DL (ref 7–20)
CALCIUM SERPL-MCNC: 9.6 MG/DL (ref 8.3–10.6)
CHLORIDE BLD-SCNC: 98 MMOL/L (ref 99–110)
CO2: 23 MMOL/L (ref 21–32)
CREAT SERPL-MCNC: 0.9 MG/DL (ref 0.6–1.2)
EKG ATRIAL RATE: 93 BPM
EKG DIAGNOSIS: NORMAL
EKG P AXIS: 77 DEGREES
EKG P-R INTERVAL: 140 MS
EKG Q-T INTERVAL: 404 MS
EKG QRS DURATION: 140 MS
EKG QTC CALCULATION (BAZETT): 502 MS
EKG R AXIS: 178 DEGREES
EKG T AXIS: 38 DEGREES
EKG VENTRICULAR RATE: 93 BPM
EOSINOPHILS ABSOLUTE: 0.1 K/UL (ref 0–0.6)
EOSINOPHILS RELATIVE PERCENT: 1.4 %
GFR AFRICAN AMERICAN: >60
GFR NON-AFRICAN AMERICAN: >60
GLUCOSE BLD-MCNC: 411 MG/DL (ref 70–99)
HCT VFR BLD CALC: 37 % (ref 36–48)
HEMOGLOBIN: 11.4 G/DL (ref 12–16)
LYMPHOCYTES ABSOLUTE: 0.9 K/UL (ref 1–5.1)
LYMPHOCYTES RELATIVE PERCENT: 15.1 %
MCH RBC QN AUTO: 22 PG (ref 26–34)
MCHC RBC AUTO-ENTMCNC: 30.7 G/DL (ref 31–36)
MCV RBC AUTO: 71.5 FL (ref 80–100)
MONOCYTES ABSOLUTE: 0.4 K/UL (ref 0–1.3)
MONOCYTES RELATIVE PERCENT: 7.1 %
NEUTROPHILS ABSOLUTE: 4.6 K/UL (ref 1.7–7.7)
NEUTROPHILS RELATIVE PERCENT: 75.6 %
PDW BLD-RTO: 23.4 % (ref 12.4–15.4)
PLATELET # BLD: 209 K/UL (ref 135–450)
PMV BLD AUTO: 8.7 FL (ref 5–10.5)
POTASSIUM SERPL-SCNC: 4.9 MMOL/L (ref 3.5–5.1)
PRO-BNP: 2158 PG/ML (ref 0–124)
RBC # BLD: 5.17 M/UL (ref 4–5.2)
SODIUM BLD-SCNC: 132 MMOL/L (ref 136–145)
TROPONIN: <0.01 NG/ML
WBC # BLD: 6.1 K/UL (ref 4–11)

## 2019-07-22 PROCEDURE — 93005 ELECTROCARDIOGRAM TRACING: CPT | Performed by: EMERGENCY MEDICINE

## 2019-07-22 PROCEDURE — 84484 ASSAY OF TROPONIN QUANT: CPT

## 2019-07-22 PROCEDURE — 85025 COMPLETE CBC W/AUTO DIFF WBC: CPT

## 2019-07-22 PROCEDURE — 93010 ELECTROCARDIOGRAM REPORT: CPT | Performed by: INTERNAL MEDICINE

## 2019-07-22 PROCEDURE — 83880 ASSAY OF NATRIURETIC PEPTIDE: CPT

## 2019-07-22 PROCEDURE — 71046 X-RAY EXAM CHEST 2 VIEWS: CPT

## 2019-07-22 PROCEDURE — 6370000000 HC RX 637 (ALT 250 FOR IP): Performed by: EMERGENCY MEDICINE

## 2019-07-22 PROCEDURE — 36415 COLL VENOUS BLD VENIPUNCTURE: CPT

## 2019-07-22 PROCEDURE — 80048 BASIC METABOLIC PNL TOTAL CA: CPT

## 2019-07-22 PROCEDURE — 99285 EMERGENCY DEPT VISIT HI MDM: CPT

## 2019-07-22 RX ORDER — FUROSEMIDE 20 MG/1
20 TABLET ORAL DAILY
Qty: 30 TABLET | Refills: 0 | Status: SHIPPED | OUTPATIENT
Start: 2019-07-22 | End: 2021-11-01

## 2019-07-22 RX ORDER — FUROSEMIDE 10 MG/ML
40 INJECTION INTRAMUSCULAR; INTRAVENOUS ONCE
Status: DISCONTINUED | OUTPATIENT
Start: 2019-07-22 | End: 2019-07-22

## 2019-07-22 RX ORDER — FUROSEMIDE 40 MG/1
40 TABLET ORAL ONCE
Status: COMPLETED | OUTPATIENT
Start: 2019-07-22 | End: 2019-07-22

## 2019-07-22 RX ORDER — POTASSIUM CHLORIDE 750 MG/1
10 TABLET, EXTENDED RELEASE ORAL DAILY
Qty: 30 TABLET | Refills: 0 | Status: SHIPPED | OUTPATIENT
Start: 2019-07-22 | End: 2021-11-01

## 2019-07-22 RX ADMIN — FUROSEMIDE 40 MG: 40 TABLET ORAL at 14:50

## 2019-07-22 ASSESSMENT — PAIN SCALES - GENERAL: PAINLEVEL_OUTOF10: 9

## 2019-07-22 ASSESSMENT — PAIN DESCRIPTION - PAIN TYPE: TYPE: CHRONIC PAIN

## 2019-07-22 NOTE — ED NOTES
Pt medicated as ordered. Pt states understanding of discharge instructions. Pt agrees to follow up with referral. Pt denies any further needs at this time. Pt ambulated to exit, denies need for wheelchair, gait steady, all pt belongings with pt.         Maki Kim RN  07/22/19 [id number]

## 2019-07-23 NOTE — ED PROVIDER NOTES
Attends [employer] service: Not on file     Active member of club or organization: Not on file     Attends meetings of clubs or organizations: Not on file     Relationship status: Not on file    Intimate partner violence:     Fear of current or ex partner: Not on file     Emotionally abused: Not on file     Physically abused: Not on file     Forced sexual activity: Not on file   Other Topics Concern    Not on file   Social History Narrative    Not on file     No current facility-administered medications for this encounter. Current Outpatient Medications   Medication Sig Dispense Refill    furosemide (LASIX) 20 MG tablet Take 1 tablet by mouth daily 30 tablet 0    potassium chloride (KLOR-CON M) 10 MEQ extended release tablet Take 1 tablet by mouth daily 30 tablet 0    docusate sodium (COLACE, DULCOLAX) 100 MG CAPS Take 100 mg by mouth 2 times daily 40 capsule 0    levothyroxine (SYNTHROID) 100 MCG tablet Take 100 mcg by mouth Daily      atorvastatin (LIPITOR) 40 MG tablet Take 40 mg by mouth nightly      Umeclidinium Bromide (INCRUSE ELLIPTA) 62.5 MCG/INH AEPB Inhale 1 puff into the lungs daily       benztropine (COGENTIN) 0.5 MG tablet Take 0.5 mg by mouth 2 times daily      Roflumilast (DALIRESP) 500 MCG tablet Take 500 mcg by mouth daily      insulin glargine (BASAGLAR KWIKPEN) 100 UNIT/ML injection pen Inject 30 Units into the skin nightly       buprenorphine (BUTRANS) 10 MCG/HR PTWK Place 1 patch onto the skin once a week.       dicyclomine (BENTYL) 10 MG capsule Take 10 mg by mouth 4 times daily (before meals and nightly) Indications: pt taking only once daily      omeprazole (PRILOSEC) 40 MG delayed release capsule Take 40 mg by mouth daily      albuterol sulfate  (90 Base) MCG/ACT inhaler Inhale 2 puffs into the lungs every 4-6 hours as needed for Wheezing      albuterol (PROVENTIL) (2.5 MG/3ML) 0.083% nebulizer solution Take 2.5 mg by nebulization daily      QUEtiapine

## 2019-07-29 ENCOUNTER — APPOINTMENT (OUTPATIENT)
Dept: PHYSICAL THERAPY | Age: 64
End: 2019-07-29
Payer: COMMERCIAL

## 2019-07-31 ENCOUNTER — OFFICE VISIT (OUTPATIENT)
Dept: ORTHOPEDIC SURGERY | Age: 64
End: 2019-07-31
Payer: COMMERCIAL

## 2019-07-31 VITALS
HEART RATE: 86 BPM | HEIGHT: 61 IN | BODY MASS INDEX: 26.22 KG/M2 | DIASTOLIC BLOOD PRESSURE: 76 MMHG | SYSTOLIC BLOOD PRESSURE: 138 MMHG | WEIGHT: 138.89 LBS

## 2019-07-31 DIAGNOSIS — Z96.611 S/P REVERSE TOTAL SHOULDER ARTHROPLASTY, RIGHT: Primary | ICD-10-CM

## 2019-07-31 PROCEDURE — G8419 CALC BMI OUT NRM PARAM NOF/U: HCPCS | Performed by: ORTHOPAEDIC SURGERY

## 2019-07-31 PROCEDURE — G8598 ASA/ANTIPLAT THER USED: HCPCS | Performed by: ORTHOPAEDIC SURGERY

## 2019-07-31 PROCEDURE — 3017F COLORECTAL CA SCREEN DOC REV: CPT | Performed by: ORTHOPAEDIC SURGERY

## 2019-07-31 PROCEDURE — G8427 DOCREV CUR MEDS BY ELIG CLIN: HCPCS | Performed by: ORTHOPAEDIC SURGERY

## 2019-07-31 PROCEDURE — 99213 OFFICE O/P EST LOW 20 MIN: CPT | Performed by: ORTHOPAEDIC SURGERY

## 2019-07-31 PROCEDURE — 1036F TOBACCO NON-USER: CPT | Performed by: ORTHOPAEDIC SURGERY

## 2019-07-31 NOTE — LETTER
Physical Therapy Rehabilitation Referral    Patient Name:  Adriana Pascal      YOB: 1955    Diagnosis:    1. S/P reverse total shoulder arthroplasty, right    DOS:3/1/19    Precautions:  scapularis     [x] Evaluate and Treat    Post Op Instructions:  [] Continuous passive motion (CPM ) [x] Elbow ROM  [x] Exercise in plane of scapula  []  Strengthening     [] Pulley and instruction   [x] Home exercise program (copy to patient)   [] Sling when arm at risk  [] Sling or brace at all times   [x] AAROM: Forward elevation to  140            [x] AAROM: External rotation to  40    [x] Isometric external rotator strengthening [x] AAROM: internal rotation: up the back  [x] Isometric abductor strengthening  [x] AAROM: Internal abduction   [] Isometric internal rotator strengthening [] AAROM: cross-body adduction             Stretching:     Strengthening:  [] Four quadrant (FE, ER, IR, CBA)  [x] Rotator cuff (ER, Abd)  [x] Forward Elevation    [x] External Rotators     [x] External Rotation    [] Internal Rotators  [] Internal Rotation: up/back   [x] Abductors     [] Internal Rotation: supine in abduction  [] Sleeper Stretch    [x] Flexors  [] Cross-body abduction    [x] Extensors  [] Pendulum (FE, Abd/Add, cw/ccw)  [x] Scapular Stabilizers   [] Wall-walking (FE, Abd)        [x] Shoulder shrugs     [] Table slides (FE)                [x] Rhomboid pinch  [] Elbow (flex, ext, pron, sup)        [] Lat.  Pull downs     [] Medial epicondylitis program       [] Forward punch   [] Lateral epicondylitis program       [] Internal rotators     [] Progressive resistive exercises  [] Bench Press        [] Bench press plus  Activities:     [] Lateral pull-downs  [] Rowing     [x] Progressive two-hand supine press  [] Stepper/Exercise bike   [x] Biceps/tricep: curls/supination   [] Swimming  [] Water exercises    Modalities:     Return to Sport:  [x] Of Choice      [] Plyometrics [] Ultrasound     [] Rhythmic stabilization  [] Iontophoresis    [] Core strengthening   [] Moist heat     [] Sports specific program:   [] Massage         [x] Cryotherapy      [] Electrical stimulation     [] Paraffin  [] Whirlpool  [] TENS    [x] Home exercise program (copy to patient). Perform exercises for:   15     minutes    3      times/day  [x] Supervised physical therapy  Frequency: []  1x week  [x] 2x week  [] 3x week  [] Other:   Duration: [] 2 weeks   [] 4 weeks  [x] 6 weeks  [] Other:     Additional Instructions:    Progressive incline deltoid strengthening program     Shiv Key MD, PhD

## 2019-08-12 ENCOUNTER — HOSPITAL ENCOUNTER (OUTPATIENT)
Dept: PHYSICAL THERAPY | Age: 64
Setting detail: THERAPIES SERIES
Discharge: HOME | End: 2019-08-12
Payer: COMMERCIAL

## 2019-08-12 PROCEDURE — 97110 THERAPEUTIC EXERCISES: CPT

## 2019-08-12 PROCEDURE — 97161 PT EVAL LOW COMPLEX 20 MIN: CPT

## 2019-08-12 NOTE — FLOWSHEET NOTE
[facility]    Physical Therapy Daily Treatment Note  Date:  2019    Patient Name:  Adelia Zavaleta    :  1955  MRN: [id number]  Restrictions/Precautions:    Medical/Treatment Diagnosis Information:  · Diagnosis: D97.372 s/P Reverse TSA (R)  · Treatment Diagnosis: R shoulder pain, R shoulder weakness, R shoulder decreased ROM  Insurance/Certification information:  PT Insurance Information: [id number] [facility] Medicare  Physician Information:  Referring Practitioner: Dr. Yudelka Blackwood of care signed (Y/N):     Date of Patient follow up with Physician: 2019    G-Code (if applicable):      Date G-Code Applied:         Progress Note: [x]  Yes  []  No  Next due by: Visit #10      Latex Allergy:  [x]NO      []YES  Preferred Language for Healthcare:   [x]English       []other:    Visit # Insurance Allowable   1 11     Pain level:  7/10     SUBJECTIVE:  See eval    OBJECTIVE: See eval  Observation:   Test measurements:      RESTRICTIONS/PRECAUTIONS: osteoporosis    Exercises/Interventions:   Therapeutic Ex- 15 min Wt / Resistance Sets/sec Reps Notes   UBE       Cane AAROM flex/press       3 way Isomet       T- band Row/pinch       T- band lower pinch       T- band ER activation       Supine SA punch       SL ER       Prone Rows/HAB/ext       Seat Table slides/Wall Slides  2 10 Flex/scaption   Seated HH  Depression       No Money       Standing flex/scap       Standing Punch       Scapular retraction  2 10                                Manual Intervention       Shld /GH Mobs       Post Cap mobs       Thoracic/Rib manipualtion       CT MT/Mobs       PROM MT                     NMR re-education       T-spine Ext       GH depres/compress       Manju Scap Bio       Scap/GH NMR       Body blade       Wall ball roll       Wall Ball bounce                  Therapeutic Exercise and NMR EXR  [x] (67645) Provided verbal/tactile cueing for activities related to

## 2019-08-12 NOTE — PLAN OF CARE
extension 4/5 4-/5; P2 7/10    strength 4+/5 4-/5               Reflexes/Sensation:    [x]Dermatomes/Myotomes intact    [x]Reflexes equal and normal bilaterally   []Other:    Joint mobility:  R GH joint hypomobile w/ PROM   []Normal    [x]Hypo   []Hyper    Palpation: +TTP R bicipital groove/bicep muscle, R deltoid tuberosity, R UT, R supraspinatus    Functional Mobility/Transfers: mod I w/ rollator    Posture: increased thoracic kyphosis, forward head    Bandages/Dressings/Incisions: incision appears intact and appropriately healing    Gait: (include devices/WB status): pt enters clinic ambulating w/ rollator w/ decreased gait speed, decreased step length    Orthopedic Special Tests:   - Spurlings                       [x] Patient history, allergies, meds reviewed. Medical chart reviewed. See intake form. Review Of Systems (ROS):  [x]Performed Review of systems (Integumentary, CardioPulmonary, Neurological) by intake and observation. Intake form has been scanned into medical record. Patient has been instructed to contact their primary care physician regarding ROS issues if not already being addressed at this time.       Co-morbidities/Complexities (which will affect course of rehabilitation):   []None           Arthritic conditions   []Rheumatoid arthritis (M05.9)  [x]Osteoarthritis (M19.91)   Cardiovascular conditions   [x]Hypertension (I10)  []Hyperlipidemia (E78.5)  []Angina pectoris (I20)  [x]Atherosclerosis (I70)   Musculoskeletal conditions   []Disc pathology   []Congenital spine pathologies   []Prior surgical intervention  [x]Osteoporosis (M81.8)  [x]Osteopenia (M85.8)   Endocrine conditions   []Hypothyroid (E03.9)  []Hyperthyroid Gastrointestinal conditions   []Constipation (U68.16)   Metabolic conditions   []Morbid obesity (E66.01)  [x]Diabetes type 1(E10.65) or 2 (E11.65)   []Neuropathy (G60.9)     Pulmonary conditions   []Asthma (J45)  []Coughing   []COPD (J44.9)   Psychological Disorders  [x]Anxiety (F41.9)  [x]Depression (F32.9)   []Other:   []Other:          Barriers to/and or personal factors that will affect rehab potential:              [x]Age  []Sex              [x]Motivation/Lack of Motivation                        [x]Co-Morbidities              []Cognitive Function, education/learning barriers              []Environmental, home barriers              []profession/work barriers  [x]past PT/medical experience  []other:  Justification: pt w/ multiple prior cancelled/no show appt. At previous PT clinic     Falls Risk Assessment (30 days):   [x] Falls Risk assessed and no intervention required. [] Falls Risk assessed and Patient requires intervention due to being higher risk   TUG score (>12s at risk):     [] Falls education provided, including       G-Codes:   QuickDASH: 61%    ASSESSMENT: Pt presents s/p R reverse TSA w/ significant impairments in functional strength and ROM beyond expected at 5 months post-op. Pt w/ poor tolerance and increased time needed to perform AAROM table slides. Pt w/ 7/10 pain at end of session. Pt will benefit from skilled PT to improve functional strength and ROM limitations, decrease pain, and improve RUE motor patterns to assist in meeting pt goals and return to PLOF.   Functional Impairments   [x]Noted spinal or UE joint hypomobility   []Noted spinal or UE joint hypermobility   [x]Decreased UE functional ROM   [x]Decreased UE functional strength   [x]Abnormal reflexes/sensation/myotomal/dermatomal deficits   [x]Decreased RC/scapular/core strength and neuromuscular control   []other:      Functional Activity Limitations (from functional questionnaire and intake)   []Reduced ability to tolerate prolonged functional positions   []Reduced ability or difficulty with changes of positions or transfers between positions   [x]Reduced ability to maintain good posture and demonstrate good body mechanics with sitting, bending, and lifting   [] Reduced ability or (impairments), activity limitations, and/or participation restrictions;:  [x] a total of 1-2 or more elements   [] a total of 3 or more elements   [] a total of 4 or more elements   [x] A clinical presentation with:  [x] stable and/or uncomplicated characteristics   [] evolving clinical presentation with changing characteristics  [] unstable and unpredictable characteristics;   [x] Clinical decision making of [x] low, [] moderate, [] high complexity using standardized patient assessment instrument and/or measurable assessment of functional outcome. [x] EVAL (LOW) 52000 (typically 30 minutes face-to-face)  [] EVAL (MOD) 18810 (typically 30 minutes face-to-face)  [] EVAL (HIGH) 79461 (typically 45 minutes face-to-face)  [] RE-EVAL     PLAN:  Frequency/Duration:  2 days per week for 6 Weeks:  INTERVENTIONS:  [x] Therapeutic exercise including: strength training, ROM, for Upper extremity and core   [x]  NMR activation and proprioception for UE, scap and Core   [x] Manual therapy as indicated for shoulder, scapula and spine to include: Dry Needling/IASTM, STM, PROM, Gr I-IV mobilizations, manipulation. [x] Modalities as needed that may include: thermal agents, E-stim, Biofeedback, US, iontophoresis as indicated  [x] Patient education on joint protection, postural re-education, activity modification, progression of HEP. HEP instruction: (see scanned forms)    GOALS:  Patient stated goal: \"hook my bra behind my back\"    Therapist goals for Patient:   Short Term Goals: To be achieved in: 2 weeks  1. Independent in HEP and progression per patient tolerance, in order to prevent re-injury. 2. Patient will have a decrease in pain to facilitate improvement in movement, function, and ADLs as indicated by Functional Deficits. Long Term Goals: To be achieved in: 6 weeks  1. Disability index score of 50% or less for the DASH to assist with reaching prior level of function.    2. Patient will demonstrate increased AROM to 90 deg elevation to allow for proper joint functioning as indicated by patients Functional Deficits. 3. Patient will demonstrate an increase in Strength to 4/5 in RUE to allow for proper functional mobility as indicated by patients Functional Deficits. 4. 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 5. Pt will be able to hook bra behind back without increased symptoms or restriction.       Electronically signed by:  Rema Dimas PT, DPT

## 2019-08-15 ENCOUNTER — HOSPITAL ENCOUNTER (OUTPATIENT)
Dept: PHYSICAL THERAPY | Age: 64
Setting detail: THERAPIES SERIES
Discharge: HOME | End: 2019-08-15
Payer: COMMERCIAL

## 2019-08-15 PROCEDURE — 97140 MANUAL THERAPY 1/> REGIONS: CPT | Performed by: SPECIALIST/TECHNOLOGIST

## 2019-08-15 PROCEDURE — 97110 THERAPEUTIC EXERCISES: CPT | Performed by: SPECIALIST/TECHNOLOGIST

## 2019-08-15 NOTE — FLOWSHEET NOTE
[facility]    Physical Therapy Daily Treatment Note  Date:  8/15/2019    Patient Name:  Randee Kent    :  1955  MRN: [id number]  Restrictions/Precautions:    Medical/Treatment Diagnosis Information:   S/P Revers Total shoulder  3-4-58     Insurance/Certification information:     Physician Information:     Plan of care signed (Y/N):     Date of Patient follow up with Physician: 2019    G-Code (if applicable):      Date G-Code Applied:         Progress Note: [x]  Yes  []  No  Next due by: Visit #10      Latex Allergy:  [x]NO      []YES  Preferred Language for Healthcare:   [x]English       []other:    Visit # Insurance Allowable   2 11     Pain level:  8/10 Rt shoulder   LB 10/10      SUBJECTIVE:  RT.  shoulder is really sore from last session beginning exercises again.  Pain patch not helping much that she receives weekly (20mg)    OBJECTIVE:   Observation:   Test measurements:      RESTRICTIONS/PRECAUTIONS: osteoporosis/ parkinsons    Exercises/Interventions:   Therapeutic Ex- 15 min Wt / Resistance Sets/sec Reps Notes   Pulleys flex       Cane AAROM flex/press       3 way Isomet       T- band Row/pinch       T- band lower pinch       T- band ER activation       Supine SA punch       SL ER       Prone Rows/HAB/ext       Seat Table slides/Wall Slides  2 10 Flex/scaption   Seated HH  Depression       No Money yellow 2 10x    Standing flex/scap       Standing Punch       Scapular retraction Rows/ Ext  Green  2 10x    IR strap walk aways  15\" 5x                         Manual Intervention       Shld /GH Mobs       Post Cap mobs       Thoracic/Rib manipualtion       CT MT/Mobs       PROM MT                     NMR re-education       T-spine Ext       GH depres/compress       Manju Scap Bio       Scap/GH NMR       Body blade       Wall ball roll       Wall Ball bounce                  Therapeutic Exercise and NMR EXR  [x] (73268) Provided verbal/tactile cueing for activities related to strengthening, flexibility, endurance, ROM  for improvements in scapular, scapulothoracic and UE control with self care, reaching, carrying, lifting, house/yardwork, driving/computer work.    [] (52398) Provided verbal/tactile cueing for activities related to improving balance, coordination, kinesthetic sense, posture, motor skill, proprioception  to assist with  scapular, scapulothoracic and UE control with self care, reaching, carrying, lifting, house/yardwork, driving/computer work. Therapeutic Activities:    [] (86121 or 65486) Provided verbal/tactile cueing for activities related to improving balance, coordination, kinesthetic sense, posture, motor skill, proprioception and motor activation to allow for proper function of scapular, scapulothoracic and UE control with self care, carrying, lifting, driving/computer work.      Home Exercise Program:    [x] (15341) Reviewed/Progressed HEP activities related to strengthening, flexibility, endurance, ROM of scapular, scapulothoracic and UE control with self care, reaching, carrying, lifting, house/yardwork, driving/computer work  [] (01156) Reviewed/Progressed HEP activities related to improving balance, coordination, kinesthetic sense, posture, motor skill, proprioception of scapular, scapulothoracic and UE control with self care, reaching, carrying, lifting, house/yardwork, driving/computer work      Manual Treatments:  PROM / STM / Oscillations-Mobs:  G-I, II, III, IV (PA's, Inf., Post.)  [x] (31706) Provided manual therapy to mobilize soft tissue/joints of cervical/CT, scapular GHJ and UE for the purpose of modulating pain, promoting relaxation,  increasing ROM, reducing/eliminating soft tissue swelling/inflammation/restriction, improving soft tissue extensibility and allowing for proper ROM for normal function with self care, reaching, carrying, lifting, house/yardwork, driving/computer work    Modalities:

## 2019-08-19 ENCOUNTER — APPOINTMENT (OUTPATIENT)
Dept: PHYSICAL THERAPY | Age: 64
End: 2019-08-19
Payer: COMMERCIAL

## 2019-08-21 ENCOUNTER — APPOINTMENT (OUTPATIENT)
Dept: PHYSICAL THERAPY | Age: 64
End: 2019-08-21
Payer: COMMERCIAL

## 2019-08-21 ENCOUNTER — HOSPITAL ENCOUNTER (OUTPATIENT)
Dept: PHYSICAL THERAPY | Age: 64
Setting detail: THERAPIES SERIES
Discharge: HOME | End: 2019-08-21
Payer: COMMERCIAL

## 2019-08-23 ENCOUNTER — HOSPITAL ENCOUNTER (OUTPATIENT)
Dept: PHYSICAL THERAPY | Age: 64
Setting detail: THERAPIES SERIES
Discharge: HOME | End: 2019-08-23
Payer: COMMERCIAL

## 2019-08-23 PROCEDURE — 97140 MANUAL THERAPY 1/> REGIONS: CPT

## 2019-08-23 PROCEDURE — 97110 THERAPEUTIC EXERCISES: CPT

## 2019-08-28 ENCOUNTER — APPOINTMENT (OUTPATIENT)
Dept: PHYSICAL THERAPY | Age: 64
End: 2019-08-28
Payer: COMMERCIAL

## 2019-08-30 ENCOUNTER — HOSPITAL ENCOUNTER (OUTPATIENT)
Dept: PHYSICAL THERAPY | Age: 64
Setting detail: THERAPIES SERIES
Discharge: HOME | End: 2019-08-30
Payer: COMMERCIAL

## 2019-08-30 PROCEDURE — 97110 THERAPEUTIC EXERCISES: CPT

## 2019-08-30 PROCEDURE — 97140 MANUAL THERAPY 1/> REGIONS: CPT

## 2019-09-04 ENCOUNTER — HOSPITAL ENCOUNTER (OUTPATIENT)
Dept: PHYSICAL THERAPY | Age: 64
Setting detail: THERAPIES SERIES
Discharge: HOME | End: 2019-09-04
Payer: COMMERCIAL

## 2019-09-04 PROCEDURE — 97110 THERAPEUTIC EXERCISES: CPT

## 2019-09-04 PROCEDURE — 97140 MANUAL THERAPY 1/> REGIONS: CPT

## 2019-09-04 NOTE — FLOWSHEET NOTE
reducing/eliminating soft tissue swelling/inflammation/restriction, improving soft tissue extensibility and allowing for proper ROM for normal function with self care, reaching, carrying, lifting, house/yardwork, driving/computer work    Modalities:      Charges:  Timed Code Treatment Minutes: 42   Total Treatment Minutes: 42     [] EVAL  [x] LE(29817) x  1   [] IONTO  [] NMR (06140) x      [] VASO  [x] Manual (49323) x  2    [] Other:  [] TA x       [] Mech Traction (79532)  [] ES(attended) (96485)      [] ES (un) (15053):     GOALS:  Patient stated goal: \"hook my bra behind my back\"    Therapist goals for Patient:   Short Term Goals: To be achieved in: 2 weeks  1. Independent in HEP and progression per patient tolerance, in order to prevent re-injury. 2. Patient will have a decrease in pain to facilitate improvement in movement, function, and ADLs as indicated by Functional Deficits. Long Term Goals: To be achieved in: 6 weeks  1. Disability index score of 50% or less for the DASH to assist with reaching prior level of function. 2. Patient will demonstrate increased AROM to 90 deg elevation to allow for proper joint functioning as indicated by patients Functional Deficits. 3. Patient will demonstrate an increase in Strength to 4/5 in RUE to allow for proper functional mobility as indicated by patients Functional Deficits. 4. 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 5. Pt will be able to hook bra behind back without increased symptoms or restriction. Progression Towards Functional goals:  [] Patient is progressing as expected towards functional goals listed. [x] Progression is slowed due to complexities listed. [] Progression has been slowed due to co-morbidities.   [] Plan just implemented, too soon to assess goals progression  [] Other:     ASSESSMENT:  Pt w/ improved tolerance w/ PROM/ manual therapy with some increased pain and stiffness mainly

## 2019-09-05 DIAGNOSIS — Z96.611 S/P REVERSE TOTAL SHOULDER ARTHROPLASTY, RIGHT: Primary | ICD-10-CM

## 2019-09-05 PROCEDURE — MISCPULLYB&C PULLY'S B&C: Performed by: ORTHOPAEDIC SURGERY

## 2019-09-06 ENCOUNTER — HOSPITAL ENCOUNTER (OUTPATIENT)
Dept: PHYSICAL THERAPY | Age: 64
Setting detail: THERAPIES SERIES
Discharge: HOME | End: 2019-09-06
Payer: COMMERCIAL

## 2019-09-06 PROCEDURE — 97140 MANUAL THERAPY 1/> REGIONS: CPT

## 2019-09-06 PROCEDURE — 97110 THERAPEUTIC EXERCISES: CPT

## 2019-09-06 NOTE — FLOWSHEET NOTE
contract-relax IR/ER                 NMR re-education       T-spine Ext       GH depres/compress       Manju Scap Bio       Scap/GH NMR       Body blade       Wall ball roll       Wall Ball bounce                  Therapeutic Exercise and NMR EXR  [x] (87366) Provided verbal/tactile cueing for activities related to strengthening, flexibility, endurance, ROM  for improvements in scapular, scapulothoracic and UE control with self care, reaching, carrying, lifting, house/yardwork, driving/computer work.    [] (52027) Provided verbal/tactile cueing for activities related to improving balance, coordination, kinesthetic sense, posture, motor skill, proprioception  to assist with  scapular, scapulothoracic and UE control with self care, reaching, carrying, lifting, house/yardwork, driving/computer work. Therapeutic Activities:    [] (95785 or 78828) Provided verbal/tactile cueing for activities related to improving balance, coordination, kinesthetic sense, posture, motor skill, proprioception and motor activation to allow for proper function of scapular, scapulothoracic and UE control with self care, carrying, lifting, driving/computer work.      Home Exercise Program:    [x] (20175) Reviewed/Progressed HEP activities related to strengthening, flexibility, endurance, ROM of scapular, scapulothoracic and UE control with self care, reaching, carrying, lifting, house/yardwork, driving/computer work  [] (25244) Reviewed/Progressed HEP activities related to improving balance, coordination, kinesthetic sense, posture, motor skill, proprioception of scapular, scapulothoracic and UE control with self care, reaching, carrying, lifting, house/yardwork, driving/computer work      Manual Treatments:  PROM / STM / Oscillations-Mobs:  G-I, II, III, IV (PA's, Inf., Post.)  [x] (12183) Provided manual therapy to mobilize soft tissue/joints of cervical/CT, scapular GHJ and UE for the purpose of modulating pain, promoting relaxation, increasing ROM, reducing/eliminating soft tissue swelling/inflammation/restriction, improving soft tissue extensibility and allowing for proper ROM for normal function with self care, reaching, carrying, lifting, house/yardwork, driving/computer work    Modalities:      Charges:  Timed Code Treatment Minutes: 40   Total Treatment Minutes: 50     [] EVAL  [x] JF(06643) x  1   [] IONTO  [] NMR (88096) x      [] VASO  [x] Manual (94953) x  2    [] Other:  [] TA x       [] Mech Traction (11253)  [] ES(attended) (21789)      [] ES (un) (50573):     GOALS:  Patient stated goal: \"hook my bra behind my back\"    Therapist goals for Patient:   Short Term Goals: To be achieved in: 2 weeks  1. Independent in HEP and progression per patient tolerance, in order to prevent re-injury. 2. Patient will have a decrease in pain to facilitate improvement in movement, function, and ADLs as indicated by Functional Deficits. Long Term Goals: To be achieved in: 6 weeks  1. Disability index score of 50% or less for the DASH to assist with reaching prior level of function. 2. Patient will demonstrate increased AROM to 90 deg elevation to allow for proper joint functioning as indicated by patients Functional Deficits. 3. Patient will demonstrate an increase in Strength to 4/5 in RUE to allow for proper functional mobility as indicated by patients Functional Deficits. 4. 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 5. Pt will be able to hook bra behind back without increased symptoms or restriction. Progression Towards Functional goals:  [] Patient is progressing as expected towards functional goals listed. [x] Progression is slowed due to complexities listed. [] Progression has been slowed due to co-morbidities.   [] Plan just implemented, too soon to assess goals progression  [] Other:     ASSESSMENT:  Pt w/ improved tolerance w/ PROM/ manual therapy with some increased pain and

## 2019-09-09 ENCOUNTER — HOSPITAL ENCOUNTER (OUTPATIENT)
Dept: PHYSICAL THERAPY | Age: 64
Setting detail: THERAPIES SERIES
Discharge: HOME | End: 2019-09-09
Payer: COMMERCIAL

## 2019-09-09 PROCEDURE — 97140 MANUAL THERAPY 1/> REGIONS: CPT

## 2019-09-09 PROCEDURE — 97110 THERAPEUTIC EXERCISES: CPT

## 2019-09-11 ENCOUNTER — OFFICE VISIT (OUTPATIENT)
Dept: ORTHOPEDIC SURGERY | Age: 64
End: 2019-09-11
Payer: COMMERCIAL

## 2019-09-11 VITALS
WEIGHT: 138.89 LBS | DIASTOLIC BLOOD PRESSURE: 64 MMHG | HEART RATE: 75 BPM | BODY MASS INDEX: 26.22 KG/M2 | SYSTOLIC BLOOD PRESSURE: 128 MMHG | HEIGHT: 61 IN

## 2019-09-11 DIAGNOSIS — Z96.612 S/P REVERSE TOTAL SHOULDER ARTHROPLASTY, LEFT: Primary | ICD-10-CM

## 2019-09-11 PROCEDURE — 99213 OFFICE O/P EST LOW 20 MIN: CPT | Performed by: ORTHOPAEDIC SURGERY

## 2019-09-11 PROCEDURE — 1036F TOBACCO NON-USER: CPT | Performed by: ORTHOPAEDIC SURGERY

## 2019-09-11 PROCEDURE — 3017F COLORECTAL CA SCREEN DOC REV: CPT | Performed by: ORTHOPAEDIC SURGERY

## 2019-09-11 PROCEDURE — G8419 CALC BMI OUT NRM PARAM NOF/U: HCPCS | Performed by: ORTHOPAEDIC SURGERY

## 2019-09-11 PROCEDURE — G8427 DOCREV CUR MEDS BY ELIG CLIN: HCPCS | Performed by: ORTHOPAEDIC SURGERY

## 2019-09-11 PROCEDURE — G8598 ASA/ANTIPLAT THER USED: HCPCS | Performed by: ORTHOPAEDIC SURGERY

## 2019-09-11 NOTE — LETTER
Shoulder Elbow Rehabilitation Referral    Patient Name: Mac Craft      YOB: 1955    Diagnosis: Right shoulder s/p reverse total shoulder arthroplasty. Precautions: None    Post Op Instructions:  [] Continuous passive motion (CPM)  [] Elbow range of motion  [] Exercise in plane of scapula  []  Strengthening     [] Pulley and instruction   [] Home exercise program (copy to patient)   [] Sling when arm at risk  [] Sling or brace at all times   [] AAROM: Forward elevation to              [] AAROM: External rotation  To      [] Isometric external rotator strengthening [] AAROM: internal rotation: up the back  [] Isometric abductor strengthening  [] AAROM: Internal abduction     [] Isometric internal rotator strengthening [] AAROM: cross-body adduction             Stretching:     Strengthening:  [x] Four quadrant (FE, ER, IR, CBA)  [x] Sleeper stretch    [] Rotator cuff (ER, IR, Abd)  [x] Forward Elevation    [x] External Rotators     [x] External Rotation    [x] Internal Rotators  [x] Internal Rotation: up/back   [x] Abductors     [x] Internal Rotation: supine in abduction [x] Flexors  [x] Cross-body abduction    [x] Extensors  [] Pendulum (FE, Abd/Add, cw/ccw)  [x] Scapular Stabilizers   [] Wall-walking (FE, Abd)   [x] Shoulder shrugs     [] Table slides     [x] Rhomboid pinch  [] Elbow (flex, ext, pron, sup)   [] Lat.  Pull downs     [] Medial epicondylitis program  [] Forward punch   [] Lateral epicondylitis program  [] Internal rotators     [] Progressive resistive exercises  [] Bench Press        [] Bench press plus  Activities:     [] Lateral pull-downs  [] Rowing     [x] Progressive incline deltoid strengthening  [] Stepper/Exercise bike   [x] Biceps: curls/supination  [] Swimming  [] Water exercises    Modalities:     Return to Sport:  [] Ultrasound     [] Plyometrics  [] Iontophoresis    [] Rhythmic stabilization  [] Moist heat     [] Core strengthening

## 2019-09-11 NOTE — PROGRESS NOTES
right reverse total shoulder arthrplasty on 3/1/19. She  continues to make progress, although she continues to demonstrate limited active shoulder mobility. Impression:  Encounter Diagnosis   Name Primary?  S/P reverse total shoulder arthroplasty, left Yes       Office Procedures:  Orders Placed This Encounter   Procedures    [facility] Physical Therapy     Referral Priority:   Routine     Referral Type:   Eval and Treat     Referral Reason:   Specialty Services Required     Requested Specialty:   Physical Therapy     Number of Visits Requested:   1       Treatment Plan:      She is making gains. She can maintain 70-80 degrees of active forward flexion, and she has no external rotation lag. She can continue physical therapy to work on strengthening. A new physical therapy letter was documented in EPIC today. We will see Cyn Elena back in 2 months and/or as needed. All questions were answered to patient's satisfaction and was encouraged to call with any further questions or concerns. Arlene Devonte is in agreement with this plan. Chalino Harding 484     09/11/19  1:28 PM    The encounter with Juan Beltran was supervised by Dr Freya Gibson who personally examined the patient and reviewed the plan. This dictation was performed with a verbal recognition program (DRAGON) and it was checked for errors. It is possible that there are still dictated errors within this office note. If so, please bring any errors to my attention for an addendum. All efforts were made to ensure that this office note is accurate.   _________  I was physically present and personally supervised the Orthopaedic Sports Medicine Fellow in the evaluation and development of a treatment plan for this patient. I personally interviewed the patient and performed a physical examination.  In addition, I discussed the patient's condition and treatment options with them. I have also reviewed and agree with the past medical, family and social history unless otherwise noted. All of the patient's questions were answered. Shiv Joyce MD, PhD  9/11/2019

## 2019-09-13 ENCOUNTER — HOSPITAL ENCOUNTER (OUTPATIENT)
Dept: PHYSICAL THERAPY | Age: 64
Setting detail: THERAPIES SERIES
Discharge: HOME | End: 2019-09-13
Payer: COMMERCIAL

## 2019-09-13 PROCEDURE — 97110 THERAPEUTIC EXERCISES: CPT

## 2019-09-13 PROCEDURE — 97140 MANUAL THERAPY 1/> REGIONS: CPT

## 2019-09-13 NOTE — FLOWSHEET NOTE
restriction. 5. Pt will be able to hook bra behind back without increased symptoms or restriction. Progression Towards Functional goals:  [] Patient is progressing as expected towards functional goals listed. [x] Progression is slowed due to complexities listed. [] Progression has been slowed due to co-morbidities. [] Plan just implemented, too soon to assess goals progression  [] Other:     ASSESSMENT:  Pt w/ continued improvement w/ PROM w/ tightness and soreness noted at available end range. Improving tolerance to PROM/manual therapy with some increased pain and stiffness mainly w/ OH flexion. Use of contract-relax technique allowed further motion and better tolerance w/ IR and ER still improving. GH  Grade 2 mobilizations demonstrated decreased fluidity of movement. Pt compensates with her upper traps due to increased RTC and periscapular weakness. Pt will continue to benefit from skilled PT to assist in return to PLOF.     Treatment/Activity Tolerance:  [x] Patient tolerated treatment well [] Patient limited by fatique  [] Patient limited by pain  [x] Patient limited by other medical complications  [] Other:     Prognosis: [] Good [x] Fair  [] Poor    Patient Requires Follow-up: [x] Yes  [] No    PLAN: Pt advised to continue to ice shoulder and obtain pulleys and perform IR towel to increase ROM; provided handout on ER AAROM and posterior capsule stretch for HEP  [x] Continue per plan of care [] Alter current plan (see comments)  [] Plan of care initiated [] Hold pending MD visit [] Discharge    Electronically signed by: Carlos Mercado PT, DPT

## 2019-09-20 ENCOUNTER — HOSPITAL ENCOUNTER (OUTPATIENT)
Dept: PHYSICAL THERAPY | Age: 64
Setting detail: THERAPIES SERIES
Discharge: HOME | End: 2019-09-20
Payer: COMMERCIAL

## 2019-09-20 PROCEDURE — 97140 MANUAL THERAPY 1/> REGIONS: CPT

## 2019-09-20 PROCEDURE — 97110 THERAPEUTIC EXERCISES: CPT

## 2019-09-20 NOTE — FLOWSHEET NOTE
Money yellow 2 10x    Standing flex/scap       Standing Punch       Scapular retraction Rows/ Ext  Green  2 10x       Posterior capsule stretch  30'' 3    Supine IR/ER  2 10 14 oz          Manual Intervention- 25'       Shld /GH Mobs       Post Cap mobs       Thoracic/Rib manipualtion       CT MT/Mobs       PROM MT  20'  Flex/scap; contract-relax IR/ER   STM  5'  Surgical incision, biceps, UT          NMR re-education       T-spine Ext       GH depres/compress       Manju Scap Bio       Scap/GH NMR       Body blade       Wall ball roll       Wall Ball bounce                  Therapeutic Exercise and NMR EXR  [x] (85295) Provided verbal/tactile cueing for activities related to strengthening, flexibility, endurance, ROM  for improvements in scapular, scapulothoracic and UE control with self care, reaching, carrying, lifting, house/yardwork, driving/computer work.    [] (78734) Provided verbal/tactile cueing for activities related to improving balance, coordination, kinesthetic sense, posture, motor skill, proprioception  to assist with  scapular, scapulothoracic and UE control with self care, reaching, carrying, lifting, house/yardwork, driving/computer work. Therapeutic Activities:    [] (77700 or 92291) Provided verbal/tactile cueing for activities related to improving balance, coordination, kinesthetic sense, posture, motor skill, proprioception and motor activation to allow for proper function of scapular, scapulothoracic and UE control with self care, carrying, lifting, driving/computer work.      Home Exercise Program:    [x] (42327) Reviewed/Progressed HEP activities related to strengthening, flexibility, endurance, ROM of scapular, scapulothoracic and UE control with self care, reaching, carrying, lifting, house/yardwork, driving/computer work  [] (25515) Reviewed/Progressed HEP activities related to improving balance, coordination, kinesthetic sense, posture, motor skill, proprioception of scapular,

## 2019-09-27 ENCOUNTER — HOSPITAL ENCOUNTER (OUTPATIENT)
Dept: PHYSICAL THERAPY | Age: 64
Setting detail: THERAPIES SERIES
Discharge: HOME | End: 2019-09-27
Payer: COMMERCIAL

## 2019-09-27 PROCEDURE — 97110 THERAPEUTIC EXERCISES: CPT

## 2019-09-27 PROCEDURE — 97140 MANUAL THERAPY 1/> REGIONS: CPT

## 2019-09-27 NOTE — FLOWSHEET NOTE
[facility]    Physical Therapy Daily Treatment Note  Date:  2019    Patient Name:  Kishore Fisher    :  1955  MRN: [id number]  Restrictions/Precautions:    Medical/Treatment Diagnosis Information:   Diagnosis: C00.182 s/P Reverse TSA (R)      DOS: 3/1/2019  Treatment Diagnosis: R shoulder pain, R shoulder weakness, R shoulder decreased ROM     Insurance/Certification information:    PT Insurance Information: [address] Medicare- 11 visits left  Physician Information:    Referring practitioner: Dr. Aditya Daniel of care signed (Y/N):     Date of Patient follow up with Physician: 2019    G-Code (if applicable):      Date G-Code Applied:         Progress Note: []  Yes  [x]  No  Next due by: Visit #10      Latex Allergy:  [x]NO      []YES  Preferred Language for Healthcare:   [x]English       []other:    Visit # Insurance Allowable   10 11     Pain level:  0/10 currently    SUBJECTIVE:  Pt reports shoulder is feeling tight today, pain is controlled. Pt states she if feeling tired 2/2 to assisting w/ her KAN the last few days due to some health issues.     OBJECTIVE:   Observation:   · Test measurements:  2019  ROM Left Right   Shoulder Flex A: 140 A: 70 (+25)   P: 100 (+20); tightness, P1 6/10   Shoulder Abd A: 170 A: 75 (+23)  P: 95 (+30) ; tightness, P1 2/10   Shoulder ER A: 64 A: 30 (+8; scaption plane)  P: 54 (scaption plane)    Shoulder IR A: 70 A: 60 (+20) (scaption plane)  P: 75 (scaption plane)       Test measurements:  2019  ROM Left Right   Shoulder Flex A: 140 A: 79   P: 110 ; tightness, P1 6/10   Shoulder Abd A: 170 A: 85  P: 105    Shoulder ER A: 64 A: 44 (scaption plane)  P: 51 (scaption plane)    Shoulder IR A: 70 A: 70 (scaption plane)  P: 80 (scaption plane)         RESTRICTIONS/PRECAUTIONS: osteoporosis/ parkinsons    Exercises/Interventions:   Therapeutic Ex- 15 min Wt / Resistance Sets/sec Reps Notes

## 2019-10-02 ENCOUNTER — HOSPITAL ENCOUNTER (OUTPATIENT)
Dept: PHYSICAL THERAPY | Age: 64
Setting detail: THERAPIES SERIES
Discharge: HOME | End: 2019-10-02
Payer: COMMERCIAL

## 2019-10-02 PROCEDURE — 97140 MANUAL THERAPY 1/> REGIONS: CPT

## 2019-10-02 PROCEDURE — 97110 THERAPEUTIC EXERCISES: CPT

## 2019-10-16 ENCOUNTER — HOSPITAL ENCOUNTER (OUTPATIENT)
Dept: PHYSICAL THERAPY | Age: 64
Setting detail: THERAPIES SERIES
Discharge: HOME | End: 2019-10-16
Payer: COMMERCIAL

## 2019-10-30 ENCOUNTER — OFFICE VISIT (OUTPATIENT)
Dept: ORTHOPEDIC SURGERY | Age: 64
End: 2019-10-30
Payer: COMMERCIAL

## 2019-10-30 VITALS
BODY MASS INDEX: 26.22 KG/M2 | HEIGHT: 61 IN | HEART RATE: 98 BPM | DIASTOLIC BLOOD PRESSURE: 74 MMHG | SYSTOLIC BLOOD PRESSURE: 138 MMHG | WEIGHT: 138.89 LBS

## 2019-10-30 DIAGNOSIS — Z96.611 S/P REVERSE TOTAL SHOULDER ARTHROPLASTY, RIGHT: Primary | ICD-10-CM

## 2019-10-30 PROCEDURE — G8427 DOCREV CUR MEDS BY ELIG CLIN: HCPCS | Performed by: ORTHOPAEDIC SURGERY

## 2019-10-30 PROCEDURE — G8484 FLU IMMUNIZE NO ADMIN: HCPCS | Performed by: ORTHOPAEDIC SURGERY

## 2019-10-30 PROCEDURE — G8419 CALC BMI OUT NRM PARAM NOF/U: HCPCS | Performed by: ORTHOPAEDIC SURGERY

## 2019-10-30 PROCEDURE — 1036F TOBACCO NON-USER: CPT | Performed by: ORTHOPAEDIC SURGERY

## 2019-10-30 PROCEDURE — 3017F COLORECTAL CA SCREEN DOC REV: CPT | Performed by: ORTHOPAEDIC SURGERY

## 2019-10-30 PROCEDURE — 99213 OFFICE O/P EST LOW 20 MIN: CPT | Performed by: ORTHOPAEDIC SURGERY

## 2019-10-30 PROCEDURE — G8598 ASA/ANTIPLAT THER USED: HCPCS | Performed by: ORTHOPAEDIC SURGERY

## 2019-11-19 ENCOUNTER — HOSPITAL ENCOUNTER (OUTPATIENT)
Dept: MAMMOGRAPHY | Age: 64
Discharge: HOME | End: 2019-11-24
Payer: COMMERCIAL

## 2019-11-19 DIAGNOSIS — Z12.31 VISIT FOR SCREENING MAMMOGRAM: ICD-10-CM

## 2020-05-13 ENCOUNTER — OFFICE VISIT (OUTPATIENT)
Dept: ORTHOPEDIC SURGERY | Age: 65
End: 2020-05-13
Payer: COMMERCIAL

## 2020-05-13 VITALS
BODY MASS INDEX: 26.22 KG/M2 | HEART RATE: 80 BPM | SYSTOLIC BLOOD PRESSURE: 138 MMHG | TEMPERATURE: 97.8 F | HEIGHT: 61 IN | DIASTOLIC BLOOD PRESSURE: 75 MMHG | WEIGHT: 138.89 LBS

## 2020-05-13 PROCEDURE — 99214 OFFICE O/P EST MOD 30 MIN: CPT | Performed by: ORTHOPAEDIC SURGERY

## 2020-05-13 PROCEDURE — G8419 CALC BMI OUT NRM PARAM NOF/U: HCPCS | Performed by: ORTHOPAEDIC SURGERY

## 2020-05-13 PROCEDURE — G8427 DOCREV CUR MEDS BY ELIG CLIN: HCPCS | Performed by: ORTHOPAEDIC SURGERY

## 2020-05-13 PROCEDURE — 1036F TOBACCO NON-USER: CPT | Performed by: ORTHOPAEDIC SURGERY

## 2020-05-13 PROCEDURE — 3017F COLORECTAL CA SCREEN DOC REV: CPT | Performed by: ORTHOPAEDIC SURGERY

## 2020-05-13 NOTE — PROGRESS NOTES
Review of Systems    Done: 7/31/19
degrees. She has 4/5 strength with external rotation internal rotation and forward flexion in the scapular plane. Radiographic:  3 xray views of the bilateral  shoulder including True AP in internal and external and axillary lateral were taken in our office today reveal no fractures, dislocations, visible tumors, or signs of acute trauma. Upon review of the left shoulder x-rays, there is no evidence of significant glenohumeral osteoarthritis with a normal acromiohumeral interval.  There is arthritic changes appreciated to the [facility] joint. The new x-rays today of the right shoulder demonstrate lucency along the medial portion of the humeral stem which is new compared to previous x-rays. There is no new changes in regard to the glenoid component. The shoulder is in a reduced position. Assessment:  Ravin Santillan is a 59 y.o. female with bilateral shoulder pain. She presents with new complaint of left shoulder pain for the past 6 months with concern for rotator cuff tear. She has had ongoing pain to the right shoulder with new evidence of lucency of the humeral stem. Impression:  Encounter Diagnoses   Name Primary?  Bilateral shoulder pain, unspecified chronicity     Tear of left rotator cuff, unspecified tear extent, unspecified whether traumatic Yes    Status post reverse total replacement of right shoulder        Office Procedures:  Orders Placed This Encounter   Procedures    XR SHOULDER RIGHT (MIN 2 VIEWS)     Standing Status:   Future     Number of Occurrences:   1     Standing Expiration Date:   5/13/2021    XR SHOULDER LEFT (MIN 2 VIEWS)     Standing Status:   Future     Number of Occurrences:   1     Standing Expiration Date:   5/13/2021       Plan: For the left shoulder, we discussed the patient that we concern for rotator cuff tear as she does have weakness and worsening pain over the last 6 months.   She has painful arc of motion in addition to worsening pain at night which are

## 2020-05-15 LAB
BASOPHILS ABSOLUTE: 0 %
BASOPHILS ABSOLUTE: 0 THOU/MCL (ref 0–0.2)
C-REACTIVE PROTEIN WIDE RANGE: 1.4 MG/L
EOSINOPHILS ABSOLUTE: 0.2 THOU/MCL (ref 0.03–0.45)
EOSINOPHILS RELATIVE PERCENT: 3 %
HCT VFR BLD CALC: 36.8 % (ref 36–46)
HEMOGLOBIN: 11.6 G/DL (ref 12–15.2)
LYMPHOCYTES ABSOLUTE: 1.5 THOU/MCL (ref 1–4)
LYMPHOCYTES RELATIVE PERCENT: 19 %
MCH RBC QN AUTO: 23.3 PG (ref 27–33)
MCHC RBC AUTO-ENTMCNC: 31.4 G/DL (ref 32–36)
MCV RBC AUTO: 74 FL (ref 82–97)
MONOCYTES # BLD: 11 %
MONOCYTES ABSOLUTE: 0.9 THOU/MCL (ref 0.2–0.9)
NEUTROPHILS ABSOLUTE: 5.3 THOU/MCL (ref 1.8–7.7)
PDW BLD-RTO: 18.8 %
PLATELET # BLD: 200 THOU/MCL (ref 140–375)
PMV BLD AUTO: 9.8 FL (ref 7.4–11.5)
RBC # BLD: 4.98 MIL/MCL (ref 3.8–5.2)
SEDIMENTATION RATE, ERYTHROCYTE: 45 MM/HR (ref 0–30)
SEG NEUTROPHILS: 67 %
WBC # BLD: 7.9 THOU/MCL (ref 3.6–10.5)

## 2020-05-27 ENCOUNTER — OFFICE VISIT (OUTPATIENT)
Dept: ORTHOPEDIC SURGERY | Age: 65
End: 2020-05-27
Payer: COMMERCIAL

## 2020-05-27 VITALS — HEIGHT: 61 IN | BODY MASS INDEX: 26.22 KG/M2 | WEIGHT: 138.89 LBS

## 2020-05-27 PROCEDURE — G8428 CUR MEDS NOT DOCUMENT: HCPCS | Performed by: ORTHOPAEDIC SURGERY

## 2020-05-27 PROCEDURE — 99214 OFFICE O/P EST MOD 30 MIN: CPT | Performed by: ORTHOPAEDIC SURGERY

## 2020-05-27 PROCEDURE — G8419 CALC BMI OUT NRM PARAM NOF/U: HCPCS | Performed by: ORTHOPAEDIC SURGERY

## 2020-05-27 PROCEDURE — 3017F COLORECTAL CA SCREEN DOC REV: CPT | Performed by: ORTHOPAEDIC SURGERY

## 2020-05-27 PROCEDURE — 20610 DRAIN/INJ JOINT/BURSA W/O US: CPT | Performed by: ORTHOPAEDIC SURGERY

## 2020-05-27 PROCEDURE — 1036F TOBACCO NON-USER: CPT | Performed by: ORTHOPAEDIC SURGERY

## 2020-05-27 RX ORDER — METHYLPREDNISOLONE ACETATE 40 MG/ML
40 INJECTION, SUSPENSION INTRA-ARTICULAR; INTRALESIONAL; INTRAMUSCULAR; SOFT TISSUE ONCE
Status: COMPLETED | OUTPATIENT
Start: 2020-05-27 | End: 2020-05-27

## 2020-05-27 RX ORDER — LIDOCAINE HYDROCHLORIDE 10 MG/ML
20 INJECTION, SOLUTION INFILTRATION; PERINEURAL ONCE
Status: COMPLETED | OUTPATIENT
Start: 2020-05-27 | End: 2020-05-27

## 2020-05-27 RX ADMIN — METHYLPREDNISOLONE ACETATE 40 MG: 40 INJECTION, SUSPENSION INTRA-ARTICULAR; INTRALESIONAL; INTRAMUSCULAR; SOFT TISSUE at 12:59

## 2020-05-27 RX ADMIN — LIDOCAINE HYDROCHLORIDE 20 ML: 10 INJECTION, SOLUTION INFILTRATION; PERINEURAL at 12:58

## 2020-05-27 NOTE — PROGRESS NOTES
Chief Complaint    Follow-up (right shoulder )      History of Present Illness:  Bubba Hunt is a pleasant, 59 y.o., female, here today for follow up of right shoulder CT scan for a painful reverse total shoulder arthroplasty done for fracture. Also for follow-up of her left shoulder MRI for pain weakness and throbbing discomfort. She reports no new injuries or setbacks. We have suspected rotator cuff disease of the left shoulder, and wanted to rule out a infection and/or a mechanical dysfunction of her right reverse shoulder arthroplasty. Medical History:  Patient's medications, allergies, past medical, surgical, social and family histories were reviewed and updated as appropriate. Review of Systems    Done: 7/31/19    Review of Systems  A 14 point review of systems was completed by the patient on 7/31/2019 and is available in the media section of the scanned medical record and was reviewed on 5/27/2020. The review is negative with the exception of those things mentioned in the HPI and Past Medical History    Vital Signs:  Vitals:       General/Appearance: Alert and oriented and in no apparent distress. Skin:  There are no skin lesions, cellulitis, or extreme edema. The patient has warm and well-perfused Bilateral upper extremities with brisk capillary refill. LEFT Shoulder Exam:  Inspection:  No gross deformities, no signs of infection. Palpation: Tenderness over cuff foot print    Active Range of Motion: Forward Elevation 145, Abduction 145, External Rotation 30, Internal Rotation L2    Passive Range of Motion: Forward Elevation 150, Abduction 150, External Rotation 30, Internal Rotation L2    Strength:  External Rotation 4/5, Internal Rotation 4/5, Supraspinatus 4/5, Champagne Toast 4/5    Special Tests:   No Vargas muscle deformity.     Neurovascular: Sensation to light touch is intact, no motor deficits, palpable radial pulses 2+    RIGHT Shoulder Examination:  Upon examination of the

## 2020-05-28 ENCOUNTER — HOSPITAL ENCOUNTER (OUTPATIENT)
Dept: GENERAL RADIOLOGY | Age: 65
Discharge: HOME | End: 2020-05-28
Payer: COMMERCIAL

## 2020-05-28 PROCEDURE — 20610 DRAIN/INJ JOINT/BURSA W/O US: CPT

## 2020-05-28 PROCEDURE — 2500000003 HC RX 250 WO HCPCS: Performed by: ORTHOPAEDIC SURGERY

## 2020-05-28 RX ADMIN — LIDOCAINE HYDROCHLORIDE 10 ML: 10 INJECTION, SOLUTION EPIDURAL; INFILTRATION; INTRACAUDAL; PERINEURAL at 12:57

## 2020-06-23 ENCOUNTER — TELEPHONE (OUTPATIENT)
Dept: ORTHOPEDIC SURGERY | Age: 65
End: 2020-06-23

## 2020-06-24 ENCOUNTER — TELEPHONE (OUTPATIENT)
Dept: ORTHOPEDIC SURGERY | Age: 65
End: 2020-06-24

## 2021-11-01 ENCOUNTER — OFFICE VISIT (OUTPATIENT)
Dept: ORTHOPEDIC SURGERY | Age: 66
End: 2021-11-01
Payer: COMMERCIAL

## 2021-11-01 VITALS — BODY MASS INDEX: 21.71 KG/M2 | HEIGHT: 61 IN | WEIGHT: 115 LBS

## 2021-11-01 DIAGNOSIS — Z96.611 S/P REVERSE TOTAL SHOULDER ARTHROPLASTY, RIGHT: Primary | ICD-10-CM

## 2021-11-01 DIAGNOSIS — G89.29 CHRONIC RIGHT SHOULDER PAIN: ICD-10-CM

## 2021-11-01 DIAGNOSIS — M25.511 CHRONIC RIGHT SHOULDER PAIN: ICD-10-CM

## 2021-11-01 PROCEDURE — G8420 CALC BMI NORM PARAMETERS: HCPCS | Performed by: PHYSICIAN ASSISTANT

## 2021-11-01 PROCEDURE — 1123F ACP DISCUSS/DSCN MKR DOCD: CPT | Performed by: PHYSICIAN ASSISTANT

## 2021-11-01 PROCEDURE — 4040F PNEUMOC VAC/ADMIN/RCVD: CPT | Performed by: PHYSICIAN ASSISTANT

## 2021-11-01 PROCEDURE — 3017F COLORECTAL CA SCREEN DOC REV: CPT | Performed by: PHYSICIAN ASSISTANT

## 2021-11-01 PROCEDURE — G8400 PT W/DXA NO RESULTS DOC: HCPCS | Performed by: PHYSICIAN ASSISTANT

## 2021-11-01 PROCEDURE — G8484 FLU IMMUNIZE NO ADMIN: HCPCS | Performed by: PHYSICIAN ASSISTANT

## 2021-11-01 PROCEDURE — 99213 OFFICE O/P EST LOW 20 MIN: CPT | Performed by: PHYSICIAN ASSISTANT

## 2021-11-01 PROCEDURE — 1090F PRES/ABSN URINE INCON ASSESS: CPT | Performed by: PHYSICIAN ASSISTANT

## 2021-11-01 PROCEDURE — 1036F TOBACCO NON-USER: CPT | Performed by: PHYSICIAN ASSISTANT

## 2021-11-01 PROCEDURE — G8428 CUR MEDS NOT DOCUMENT: HCPCS | Performed by: PHYSICIAN ASSISTANT

## 2021-11-01 RX ORDER — HYDRALAZINE HYDROCHLORIDE 100 MG/1
TABLET, FILM COATED ORAL
COMMUNITY
Start: 2021-10-12

## 2021-11-01 RX ORDER — BUPRENORPHINE HYDROCHLORIDE 150 UG/1
FILM, SOLUBLE BUCCAL
COMMUNITY

## 2021-11-01 RX ORDER — ESTRADIOL 0.1 MG/D
FILM, EXTENDED RELEASE TRANSDERMAL
COMMUNITY
Start: 2021-10-09

## 2021-11-01 RX ORDER — FUROSEMIDE 40 MG/1
TABLET ORAL
COMMUNITY
Start: 2021-10-12

## 2021-11-01 RX ORDER — LANCETS 33 GAUGE
EACH MISCELLANEOUS
COMMUNITY
Start: 2021-10-04 | End: 2022-01-26

## 2021-11-01 RX ORDER — LOPERAMIDE HYDROCHLORIDE 2 MG/1
CAPSULE ORAL
COMMUNITY
Start: 2021-09-10

## 2021-11-01 RX ORDER — CYCLOBENZAPRINE HCL 5 MG
TABLET ORAL
COMMUNITY
Start: 2021-10-12

## 2021-11-01 RX ORDER — GABAPENTIN 600 MG/1
TABLET ORAL
COMMUNITY
Start: 2021-10-12

## 2021-11-01 RX ORDER — CARBIDOPA/LEVODOPA 25MG-250MG
TABLET ORAL
COMMUNITY
Start: 2021-10-12

## 2021-11-01 RX ORDER — ATORVASTATIN CALCIUM 20 MG/1
TABLET, FILM COATED ORAL
COMMUNITY
Start: 2021-10-12

## 2021-11-01 RX ORDER — BUDESONIDE, GLYCOPYRROLATE, AND FORMOTEROL FUMARATE 160; 9; 4.8 UG/1; UG/1; UG/1
AEROSOL, METERED RESPIRATORY (INHALATION)
COMMUNITY
Start: 2021-10-20

## 2021-11-01 RX ORDER — ERGOCALCIFEROL 1.25 MG/1
CAPSULE ORAL
COMMUNITY
Start: 2021-10-12

## 2021-11-01 RX ORDER — METHOCARBAMOL 500 MG/1
500 TABLET, FILM COATED ORAL
COMMUNITY

## 2021-11-01 RX ORDER — TRAMADOL HYDROCHLORIDE 50 MG/1
TABLET ORAL
COMMUNITY
Start: 2021-10-27

## 2021-11-01 RX ORDER — NIFEDIPINE 90 MG/1
TABLET, FILM COATED, EXTENDED RELEASE ORAL
COMMUNITY
Start: 2021-10-13

## 2021-11-01 RX ORDER — POTASSIUM CHLORIDE 750 MG/1
CAPSULE, EXTENDED RELEASE ORAL
COMMUNITY
Start: 2021-10-12 | End: 2022-01-26

## 2021-11-01 RX ORDER — INSULIN ASPART 100 [IU]/ML
INJECTION, SOLUTION INTRAVENOUS; SUBCUTANEOUS
COMMUNITY
Start: 2021-10-12

## 2021-11-01 RX ORDER — EMPAGLIFLOZIN 25 MG/1
TABLET, FILM COATED ORAL
COMMUNITY
Start: 2021-10-12

## 2021-11-01 RX ORDER — LISINOPRIL 20 MG/1
TABLET ORAL
COMMUNITY
Start: 2021-10-12

## 2021-11-01 RX ORDER — CLONAZEPAM 0.5 MG/1
TABLET ORAL
COMMUNITY
Start: 2021-10-12

## 2021-11-01 NOTE — PROGRESS NOTES
Diagnosis Date    Anxiety     Arthritis     CAD (coronary artery disease)     Chronic back pain since 1990's    [facility]ch[facility]     Depression     Diabetes mellitus [facility]     Fibromyalgia     Full dentures     GERD (gastroesophageal reflux disease)     Hyperlipidemia     Hypertension     IBS (irritable bowel syndrome)     Kidney failure 1999    r/t sepsis    MI (myocardial infarction) ([facility] 2008    PACO (obstructive sleep apnea)     NO CPAP     Parkinson disease ([facility]     Restless leg syndrome     Shoulder injury 10/07/2018    RT- recent fall     Thyroid disease      Past Surgical History:   Procedure Laterality Date    BRONCHOSCOPY  06/2017    [facility]    CARPAL TUNNEL RELEASE Left     CARPAL TUNNEL RELEASE Right 04/04/2018    COLONOSCOPY      CORONARY ANGIOPLASTY WITH STENT PLACEMENT      FRACTURE SURGERY Left     leg    HIP FRACTURE SURGERY Left     OPEN TREATMENT PROX HUMERAL FRACTURE Right 10/26/2018    RIGHT SHOULDER OPEN REDUCTION INTERNAL FIXATION PROXIMAL HUMERUS WITH TIBIAL ALLOGRAFT STRUT AND DBX MIX; LYSIS OF ADHESIONS; OPEN BICEPS TENODESIS, RIGHT SHOULDER ARTHROTOMY performed by Pearlean Holter, MD at [address] [place] [address] N/A 08/17/2018    SUPERION IMPLANT LUMBAR THREE-FOUR, LUMBAR FOUR-FIVE    REVISION OF TOTAL SHOULDER Right 3/1/2019    RIGHT SHOULDER, LYSIS OF ADHESIONS, REMOVAL OF DEEP IMPLANTS, REVERSE TOTAL SHOULDER ARTHROPLASTY performed by Pearlean Holter, MD at [address] [place] [address]       Current Outpatient Medications on File Prior to Visit   Medication Sig Dispense Refill    carbidopa-levodopa (SINEMET)  MG per tablet TAKE THREE TABLETS BY MOUTH THREE TIMES DAILY      cyclobenzaprine (FLEXERIL) 5 MG tablet TAKE ONE TABLET BY MOUTH THREE TIMES DAILY AS NEEDED FOR muscle spasm      furosemide (LASIX) 40 MG tablet TAKE ONE TABLET BY MOUTH EVERY DAY      NOVOLOG FLEXPEN 100 UNIT/ML injection pen inject SUBCUTANEOUSLY BEFORE meals THREE TIMES DAILY PER sliding scale max 40 UNITS DAILY      docusate sodium (COLACE, DULCOLAX) 100 MG CAPS Take 100 mg by mouth 2 times daily 40 capsule 0    levothyroxine (SYNTHROID) 100 MCG tablet Take 100 mcg by mouth Daily      atorvastatin (LIPITOR) 40 MG tablet Take 40 mg by mouth nightly      Umeclidinium Bromide (INCRUSE ELLIPTA) 62.5 MCG/INH AEPB Inhale 1 puff into the lungs daily       benztropine (COGENTIN) 0.5 MG tablet Take 0.5 mg by mouth 2 times daily      Roflumilast (DALIRESP) 500 MCG tablet Take 500 mcg by mouth daily      insulin glargine (BASAGLAR KWIKPEN) 100 UNIT/ML injection pen Inject 30 Units into the skin nightly       buprenorphine (BUTRANS) 10 MCG/HR PTWK Place 1 patch onto the skin once a week.  dicyclomine (BENTYL) 10 MG capsule Take 10 mg by mouth 4 times daily (before meals and nightly) Indications: pt taking only once daily      omeprazole (PRILOSEC) 40 MG delayed release capsule Take 40 mg by mouth daily      albuterol sulfate  (90 Base) MCG/ACT inhaler Inhale 2 puffs into the lungs every 4-6 hours as needed for Wheezing      albuterol (PROVENTIL) (2.5 MG/3ML) 0.083% nebulizer solution Take 2.5 mg by nebulization daily      QUEtiapine (SEROQUEL) 25 MG tablet Take 25 mg by mouth nightly      gabapentin (NEURONTIN) 300 MG capsule Take 300 mg by mouth 3 times daily. Junaid Mcdowell insulin glargine (LANTUS) 100 UNIT/ML injection vial Inject 40 Units into the skin nightly AM      amLODIPine (NORVASC) 5 MG tablet Take 10 mg by mouth daily       aspirin (ASPIR-LOW) 81 MG EC tablet Take 81 mg by mouth daily       clopidogrel (PLAVIX) 75 MG tablet Take 75 mg by mouth      lurasidone (LATUDA) 60 MG TABS tablet Take 60 mg by mouth nightly       metoprolol tartrate (LOPRESSOR) 25 MG tablet Take 25 mg by mouth 2 times daily       montelukast (SINGULAIR) 10 MG tablet Take 10 mg by mouth nightly       rOPINIRole (REQUIP) 4 MG tablet TAKE 1 TABLET Diabetes Mother     Stroke Mother     High Blood Pressure Mother     Diabetes Father     High Blood Pressure Father        Current Medications:    Current Outpatient Medications   Medication Sig Dispense Refill    carbidopa-levodopa (SINEMET)  MG per tablet TAKE THREE TABLETS BY MOUTH THREE TIMES DAILY      cyclobenzaprine (FLEXERIL) 5 MG tablet TAKE ONE TABLET BY MOUTH THREE TIMES DAILY AS NEEDED FOR muscle spasm      furosemide (LASIX) 40 MG tablet TAKE ONE TABLET BY MOUTH EVERY DAY      NOVOLOG FLEXPEN 100 UNIT/ML injection pen inject SUBCUTANEOUSLY BEFORE meals THREE TIMES DAILY PER sliding scale max 40 UNITS DAILY      docusate sodium (COLACE, DULCOLAX) 100 MG CAPS Take 100 mg by mouth 2 times daily 40 capsule 0    levothyroxine (SYNTHROID) 100 MCG tablet Take 100 mcg by mouth Daily      atorvastatin (LIPITOR) 40 MG tablet Take 40 mg by mouth nightly      Umeclidinium Bromide (INCRUSE ELLIPTA) 62.5 MCG/INH AEPB Inhale 1 puff into the lungs daily       benztropine (COGENTIN) 0.5 MG tablet Take 0.5 mg by mouth 2 times daily      Roflumilast (DALIRESP) 500 MCG tablet Take 500 mcg by mouth daily      insulin glargine (BASAGLAR KWIKPEN) 100 UNIT/ML injection pen Inject 30 Units into the skin nightly       buprenorphine (BUTRANS) 10 MCG/HR PTWK Place 1 patch onto the skin once a week.  dicyclomine (BENTYL) 10 MG capsule Take 10 mg by mouth 4 times daily (before meals and nightly) Indications: pt taking only once daily      omeprazole (PRILOSEC) 40 MG delayed release capsule Take 40 mg by mouth daily      albuterol sulfate  (90 Base) MCG/ACT inhaler Inhale 2 puffs into the lungs every 4-6 hours as needed for Wheezing      albuterol (PROVENTIL) (2.5 MG/3ML) 0.083% nebulizer solution Take 2.5 mg by nebulization daily      QUEtiapine (SEROQUEL) 25 MG tablet Take 25 mg by mouth nightly      gabapentin (NEURONTIN) 300 MG capsule Take 300 mg by mouth 3 times daily. .      insulin glargine (LANTUS) 100 UNIT/ML injection vial Inject 40 Units into the skin nightly AM      amLODIPine (NORVASC) 5 MG tablet Take 10 mg by mouth daily       aspirin (ASPIR-LOW) 81 MG EC tablet Take 81 mg by mouth daily       clopidogrel (PLAVIX) 75 MG tablet Take 75 mg by mouth      lurasidone (LATUDA) 60 MG TABS tablet Take 60 mg by mouth nightly       metoprolol tartrate (LOPRESSOR) 25 MG tablet Take 25 mg by mouth 2 times daily       montelukast (SINGULAIR) 10 MG tablet Take 10 mg by mouth nightly       rOPINIRole (REQUIP) 4 MG tablet TAKE 1 TABLET EVERY DAY AT NIGHT      VORTIoxetine (TRINTELLIX) 10 MG TABS tablet Take 10 mg by mouth daily       linagliptin (TRADJENTA) 5 MG tablet Take 5 mg by mouth daily       traZODone (DESYREL) 50 MG tablet Take 25 mg by mouth nightly        No current facility-administered medications for this visit. Allergies: Allergies   Allergen Reactions    Combivent Respimat [Ipratropium-Albuterol]      Nose bleeds  SHE SAID SHE CAN TAKE ALBUTEROL    Metformin Other (See Comments)     Effects kidneys    Primidone Other (See Comments)     Other reaction(s): Other (See Comments)  Loss of muscle control  COULDN'T USE HER MUSCLES    Quinidine Hives    Sulfa Antibiotics Rash       Physical Exam:  There were no vitals filed for this visit. General: Claudene Repress is a healthy and well appearing 77 y.o. female who is sitting comfortably in our office in acute distress. General Exam:   Constitutional: Patient is adequately groomed with no evidence of malnutrition  DTRs: Deep tendon reflexes are intact  Mental Status: The patient is oriented to time, place and person. The patient's mood and affect are appropriate. Lymphatic: The lymphatic examination bilaterally reveals all areas to be without enlargement or induration. Vascular: Examination reveals no swelling or calf tenderness. Peripheral pulses are palpable and 2+.   Neurological: The patient has good office today reveals some changers over the humeral component concerning for loosening. This is similar to her prior radiographs. CT RIGHT Shoulder (mislabelled on Epic), 5/26/2020  FINDINGS:  Status post reverse shoulder arthroplasty.  No radiolucency around prosthesis to    suggest loosening.  Deformity of humeral diaphysis consistent with prior fracture with    malunion.  The inferior aspect of the humeral component abutting scapula with focal osseous    erosion.  No fracture or displacement of prosthesis.  The metaglene is flush with the native    glenoid cup.  Moderate atrophy of subscapularis muscle.       No cortical fracture.  A wide AC joint space.       No fracture of the ribs.       CONCLUSION:   1. Status post reverse arthroplasty of the shoulder.  Probable erosion of inferior humeral    component into inferior scapula. 2. 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 Self assessment questionnaires including ASES and Simple Shoulder Test were completed today. Assessment:  Davion Cash is a 77 y.o. female with history of undergoing a right reverse total shoulder arthroplasty on 3/1/2019 has been having persistent symptoms of pain and worsening range of motion for over a year. Patient had been in the process of being worked up for prosthetic loosening and infection of the right shoulder. We will continue to work this up today. Impression:  Encounter Diagnoses   Name Primary?  S/P reverse total shoulder arthroplasty, right Yes    Chronic right shoulder pain        Office Procedures:  Orders Placed This Encounter   Procedures    XR SHOULDER RIGHT (MIN 2 VIEWS)     Standing Status:   Future     Number of Occurrences:   1     Standing Expiration Date:   11/1/2022     Order Specific Question:   Reason for exam:     Answer:   pain       Plan:    At this time we recommend that she have a repeat CT scan of her right shoulder since its been over a year and a half to evaluate for prosthetic loosening. CT did show some erosive changes along the humeral component. .  Additionally we will have her get a right shoulder aspiration under ultrasound guidance. We will refer her to Dr. Maria R Luna for this. We will have him send the aspirate off to [facility]. Additionally we will go ahead and order some lab work including CBC, CRP and sed rate to evaluate for infection. Once these are completed we will see her back in our office with Dr. Pete Esqueda. She was agreeable to the above plan. All her questions were fully answered today. 11/1/2021  2:47 PM      Kelsey Alatorre PA-C  Orthopaedic Sports Medicine Physician Assistant    This dictation was performed with a verbal recognition program [facility]) and it was checked for errors. It is possible that there are still dictated errors within this office note. If so, please bring any errors to my attention for an addendum. All efforts were made to ensure that this office note is accurate.

## 2021-11-16 ENCOUNTER — OFFICE VISIT (OUTPATIENT)
Dept: ORTHOPEDIC SURGERY | Age: 66
End: 2021-11-16
Payer: COMMERCIAL

## 2021-11-16 ENCOUNTER — TELEPHONE (OUTPATIENT)
Dept: ORTHOPEDIC SURGERY | Age: 66
End: 2021-11-16

## 2021-11-16 VITALS — BODY MASS INDEX: 21.34 KG/M2 | HEIGHT: 61 IN | WEIGHT: 113 LBS

## 2021-11-16 DIAGNOSIS — Z96.611 S/P REVERSE TOTAL SHOULDER ARTHROPLASTY, RIGHT: Primary | ICD-10-CM

## 2021-11-16 DIAGNOSIS — M25.511 CHRONIC RIGHT SHOULDER PAIN: ICD-10-CM

## 2021-11-16 DIAGNOSIS — G89.29 CHRONIC RIGHT SHOULDER PAIN: ICD-10-CM

## 2021-11-16 DIAGNOSIS — Z96.611 S/P REVERSE TOTAL SHOULDER ARTHROPLASTY, RIGHT: ICD-10-CM

## 2021-11-16 LAB
C-REACTIVE PROTEIN: <3 MG/L (ref 0–5.1)
SEDIMENTATION RATE, ERYTHROCYTE: 18 MM/HR (ref 0–30)

## 2021-11-16 PROCEDURE — 20611 DRAIN/INJ JOINT/BURSA W/US: CPT | Performed by: ORTHOPAEDIC SURGERY

## 2021-11-16 PROCEDURE — 1036F TOBACCO NON-USER: CPT | Performed by: ORTHOPAEDIC SURGERY

## 2021-11-16 PROCEDURE — 4040F PNEUMOC VAC/ADMIN/RCVD: CPT | Performed by: ORTHOPAEDIC SURGERY

## 2021-11-16 PROCEDURE — 3017F COLORECTAL CA SCREEN DOC REV: CPT | Performed by: ORTHOPAEDIC SURGERY

## 2021-11-16 PROCEDURE — G8427 DOCREV CUR MEDS BY ELIG CLIN: HCPCS | Performed by: ORTHOPAEDIC SURGERY

## 2021-11-16 PROCEDURE — G8484 FLU IMMUNIZE NO ADMIN: HCPCS | Performed by: ORTHOPAEDIC SURGERY

## 2021-11-16 PROCEDURE — 1123F ACP DISCUSS/DSCN MKR DOCD: CPT | Performed by: ORTHOPAEDIC SURGERY

## 2021-11-16 PROCEDURE — G8420 CALC BMI NORM PARAMETERS: HCPCS | Performed by: ORTHOPAEDIC SURGERY

## 2021-11-16 PROCEDURE — 99213 OFFICE O/P EST LOW 20 MIN: CPT | Performed by: ORTHOPAEDIC SURGERY

## 2021-11-16 PROCEDURE — G8400 PT W/DXA NO RESULTS DOC: HCPCS | Performed by: ORTHOPAEDIC SURGERY

## 2021-11-16 PROCEDURE — 1090F PRES/ABSN URINE INCON ASSESS: CPT | Performed by: ORTHOPAEDIC SURGERY

## 2021-11-16 RX ORDER — LIDOCAINE HYDROCHLORIDE 10 MG/ML
3 INJECTION, SOLUTION INFILTRATION; PERINEURAL ONCE
Status: COMPLETED | OUTPATIENT
Start: 2021-11-16 | End: 2021-11-16

## 2021-11-16 RX ADMIN — LIDOCAINE HYDROCHLORIDE 3 ML: 10 INJECTION, SOLUTION INFILTRATION; PERINEURAL at 14:48

## 2021-11-16 NOTE — PATIENT INSTRUCTIONS
Impression:   1. Painful right reverse total shoulder replacement. 2.  Rule out loosening or possible infection of prosthetic components. 3.  No fluid could be obtained for Synovasure testing. Plan/Treatment:   1. Ultrasound evaluation of the right shoulder was performed using FlagTap 18/4 linear component. No obvious fluid collections were noted around the right shoulder including the subdeltoid space. 2.  Aspiration was attempted x2 in the anterior aspect of the right humerus. The area was prepped with ChloraPrep anesthetized with 1% Xylocaine and then an 18-gauge needle was introduced underneath the capsule at the junction between the glenosphere and humeral components. 2 different attempts were made in different sites but no fluid could be obtained. 3.  The patient was told to proceed with work-up including lab test which should be done today while she is here at [facility].  4.  Proceed with CT scan of right shoulder as ordered. 5.  She is return to Dr. Edgard Barnes [facility].       Viviane Babcock MD  11/16/2021

## 2021-11-16 NOTE — PROGRESS NOTES
Follow Up Shoulder Evaluation   11/16/2021    Joe Sim      1955        Karlos Jessica is seen in follow up for Follow-up (Right shoulder. Here for Synovasure test with Ultrasound. Referred by Dr Carlie Calle)       Karlos Jessica is a 78-year-old woman sent by Dr. Carlie Calle for an ultrasound aspiration of her reverse total shoulder replacement for Synovasure testing. She is status post reverse right shoulder replacement on March 1, 2019 by Dr. Carlie Calle. By May 2020 she was back to Dr. Junior Bowen office complaining of persistent pain and loss of motion. A work-up ensued including hematologic testing and CT scan however the patient never followed up until November 2021 for persistent shoulder pain. The CRP done on 5/15/2020 was normal at 1.4. The sed rate done on the same date was mildly elevated at 45. CT scan performed on 5/26/2020 showed possible loosening of the stem of the humeral component. Unfortunately Karlos Jessica did not follow-up until November 2021. X-rays repeated for Dr. Rodo Pace at her 11/1/2021 visit was worrisome for possible further loosening of her humeral component. For this reason work-up was reordered including repeat hematologic testing repeat CT scan in addition she was sent to my office for attempted aspiration to obtain specimen for Synovasure testing. Karlos Jessica continues to complain of persistent pain in her right shoulder rated 6 at rest and up to 10 with activities. She has difficulty sleeping at night. She states that her function is worse than it was prior to her replacement. She states there was no signs of infection following her operation and she denies having fever or drainage. I have today reviewed with Joe Sim the clinically relevant past medical history, medications, allergies, family history, and Review of Systems from the patients most recent history form, and I have documented any details relevant to today's presenting complaints in my history above.   The patient's self recorded documents concerning the above have been scanned  into the chart under the \"Media\" tab. Ht 5' 1\" (1.549 m)   Wt 113 lb (51.3 kg)   BMI 21.35 kg/m²     PHYSICAL EXAMINATION    General Appearance: no acute distress  Atrophy: Yes generalized atrophy including deltoid. Ecchymosis: No   Errythemia: No  Swelling: No   Deformity: Yes well-healed scar for reverse total shoulder replacement. Palpation/Tenderness: yes -anterior glenohumeral joint. Crepitus: Negative        X-Ray Findings from Outside Source: Show the reverse shoulder replacement remains in good position. There did appear to be changes within the humeral diaphysis which could represent old healed fracture. Loosening of the humeral component is not obvious. Impression:   1. Painful right reverse total shoulder replacement. 2.  Rule out loosening or possible infection of prosthetic components. 3.  No fluid could be obtained for Synovasure testing. Plan/Treatment:   1. Ultrasound evaluation of the right shoulder was performed using [employer] 18/4 linear component. No obvious fluid collections were noted around the right shoulder including the subdeltoid space. 2.  Aspiration was attempted x2 in the anterior aspect of the right humerus. The area was prepped with ChloraPrep anesthetized with 1% Xylocaine and then an 18-gauge needle was introduced underneath the capsule at the junction between the glenosphere and humeral components. 2 different attempts were made in different sites but no fluid could be obtained. 3.  The patient was told to proceed with work-up including lab test which should be done today while she is here at [facility].  4.  Proceed with CT scan of right shoulder as ordered. 5.  She is return to Dr. Junior Andrés young. Elsi Pierson MD  11/16/2021    This dictation was done with [employer] dictation and may contain mechanical errors related to translation.

## 2021-11-28 NOTE — FLOWSHEET NOTE
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 Terell Leiva of care signed (Y/N): y    Date of Patient follow up with Physician:     Functional score:      Date functional scale completed    PT G-Codes  Functional Assessment Tool Used: Quick Dash  Score: 61% LOF    Progress Note: ?  Yes  ? No  Next due by: Visit #10      Latex Allergy:  ?NO      ? YES  Preferred Language for Healthcare:   ?English       ? other:    Visit # Insurance Allowable Date Range   5 Eval + 30 up to $2010 N/A     Pain level: 3/10     SUBJECTIVE:  Reports increased compliance with HEP since last PT session and is more sore but doing well.      OBJECTIVE: 19   Observation    Test measurements:     PROM:  Flex: 108  ER: 40 deg (limited per protocol)     RESTRICTIONS/PRECAUTIONS: HTN, Fibromyalgia, Depression, Anxiety, Parkinson's Disease, MI, CAD, Type II Diabetes (on insulin),  PROTOCOL IN MEDIA       SURGERY DATE: 3/1/19    Exercises/Interventions:   Therapeutic Exercise  Resistance / level Sets/sec Reps Notes          Scap retraction  3\" 30 HEP cueing to minimize shrugging   Pendulums   X 20 HEP     ER with wand/AAROM Limited to 40 or per tolerance if before 40 deg  x10 HEP cueing for plane of movement for proper ROM Held   Supine AAROM flex with wand Current limit is 140 deg(pt not yet to this point)  x10 HEP WAND added    Abduction isometrics  10\" 10 Added 4/   AROM elbow   10 Attempted  but stopped due to pain Added back in    Table Slides Flex 5\" 10 Added    Pulleys FE 5\" 2 10 Added 4/16   Bicep Curls 1lb 2 10 Added 4/23                        Neuromuscular Re-ed / Therapeutic Activities                                                         Manual Intervention        Shld /GH Mobs       Post Cap mobs       Thoracic/Rib manipualtion       STM (R) upper trap, anterior and posterior shoulder 3'     PROM MT Flex, ER,  abd 10'  Within protocol ROM limits              Therapeutic Exercise and NMR EXR  X  (25068) Provided verbal/tactile cueing for activities related to strengthening, flexibility, endurance, ROM  for improvements in scapular, scapulothoracic and UE control with self care, reaching, carrying, lifting, house/yardwork, driving/computer work. ? (75376) Provided verbal/tactile cueing for activities related to improving balance, coordination, kinesthetic sense, posture, motor skill, proprioception  to assist with  scapular, scapulothoracic and UE control with self care, reaching, carrying, lifting, house/yardwork, driving/computer work. Therapeutic Activities:    X  (83537 or 47794) Provided verbal/tactile cueing for activities related to improving balance, coordination, kinesthetic sense, posture, motor skill, proprioception and motor activation to allow for proper function of scapular, scapulothoracic and UE control with self care, carrying, lifting, driving/computer work.      Home Exercise Program:    ? (11176) Reviewed/Progressed HEP activities related to strengthening, flexibility, endurance, ROM of scapular, scapulothoracic and UE control with self care, reaching, carrying, lifting, house/yardwork, driving/computer work  ? (93760) Reviewed/Progressed HEP activities related to improving balance, coordination, kinesthetic sense, posture, motor skill, proprioception of scapular, scapulothoracic and UE control with self care, reaching, carrying, lifting, house/yardwork, driving/computer work      Manual Treatments:  PROM / STM / Oscillations-Mobs:  G-I, II, III, IV (Rosanne, Inf., Post.)  X (25976) Provided manual therapy to mobilize soft tissue/joints of cervical/CT, scapular GHJ and UE for the purpose of modulating pain, promoting relaxation,  increasing ROM, reducing/eliminating soft tissue swelling/inflammation/restriction, improving soft tissue extensibility and allowing for proper ROM for normal function with self care, reaching, carrying, lifting, house/yardwork, driving/computer work    Modalities: ice x 10'    Charges:  Timed Code Treatment Minutes: 36'   Total Treatment Minutes: 50        EVAL - LOW (40847)   ? EVAL - MOD (44550)  ? EVAL - HIGH (46470)  ? RE-EVAL (36617)  X LI(48264) x 1  ? IONTO  ? NMR (39190) x     ? VASO  X Manual (05685) x 1   ? Other:  ? TA x      ? Mech Traction (33927)  ? ES(attended) (63016)     ? ES (un) (71535):     GOALS:  Patient stated goal: daily use of arm    Therapist goals for Patient:   Short Term Goals: To be achieved in: 2 weeks  1. Independent in HEP and progression per patient tolerance, in order to prevent re-injury. 2. Patient will have a decrease in pain to facilitate improvement in movement, function, and ADLs as indicated by Functional Deficits. Long Term Goals: To be achieved in: 12 weeks  1. Disability index score of 30% or less for the UEFI or Quick DASH to assist with reaching prior level of function. 2. Patient will demonstrate increased AROM to 135 deg or > (R) flex/abd to allow for proper joint functioning as indicated by patients Functional Deficits. 3. Patient will demonstrate an increase in Strength to 4/5 or > (R) IR, and flex to allow for proper functional mobility as indicated by patients Functional Deficits. 4. Patient will return to functional activities including light to moderate housework activities without increased symptoms or restriction. Progression Towards Functional goals:  ? Patient is progressing as expected towards functional goals listed.     ? Progression is slowed due to complexities listed. ? Progression has been slowed due to co-morbidities. ? Plan just implemented, too soon to assess goals progression  ? Other:     Persisting Functional Limitations/Impairments:  ?Sitting ? Standing   ? Walking ? Squatting/bending    ? Stairs X ADL's    xTransfers X Reaching  X Housework ? Job related tasks  ? Driving X Sports/Recreation   ? Other:    ASSESSMENT:    Pt demonstrates fair shoulder motion this date with passive stretching but is still limited by joint stiffness and some muscle guarding. Pt demonstrates low pain threshold as well with exercises. Continue to push exercises and motion as tolerated and per protocol to work toward functional movement and use of extremity for daily tasks. Pt is currently limited in functional tasks and has difficulty with self care tasks, household tasks, lifting, carrying and reaching in mulitplanar directions. Treatment/Activity Tolerance:  ? Patient tolerated treatment well ? Patient limited by fatique  X  Patient limited by pain  ? Patient limited by other medical complications  ? Other:     Prognosis: ? Good X Fair  ? Poor    Patient Requires Follow-up: ? Yes  ? No    Return to Play:    ?  N/A   ? Stage 1: Intro to Strength   ? Stage 2: Dynamic Strength and Intro to Plyometrics   ? Stage 3: Advanced Plyometrics and Intro to Throwing   ? Stage 4: Sport specific Training/Return to Sport   ? Ready to Return to Play, Meets All Above Stages   ? Not Ready for Return to Sports   Comments:      PLAN: Add light resistance with retraction  x Continue per plan of care ? Alter current plan (see comments)   Plan of care initiated ? Hold pending MD visit ?  Discharge    Electronically signed by: Zahraa Wynn [id number] No

## 2021-12-15 ENCOUNTER — TELEPHONE (OUTPATIENT)
Dept: ORTHOPEDIC SURGERY | Age: 66
End: 2021-12-15

## 2021-12-15 NOTE — TELEPHONE ENCOUNTER
General Question     Subject: PATIENT NEEDS A PAMPHLET WITH ADDRESS FOR [facility] FOR MRI. SHE HAS MISPLACED HERS.   Patient:  Harleen Michelle  Contact Number: [phone]

## 2021-12-15 NOTE — TELEPHONE ENCOUNTER
General Question     Subject: Patient states [facility] in [facility] does not have the order to proceed with scheduling. Please advise.    Patient:  Caitlin Alcantara  Contact Number: [phone]

## 2022-01-26 ENCOUNTER — OFFICE VISIT (OUTPATIENT)
Dept: ORTHOPEDIC SURGERY | Age: 67
End: 2022-01-26
Payer: MEDICARE

## 2022-01-26 VITALS — WEIGHT: 113 LBS | HEIGHT: 61 IN | BODY MASS INDEX: 21.34 KG/M2

## 2022-01-26 DIAGNOSIS — Z96.611 S/P REVERSE TOTAL SHOULDER ARTHROPLASTY, RIGHT: Primary | ICD-10-CM

## 2022-01-26 DIAGNOSIS — M25.511 CHRONIC RIGHT SHOULDER PAIN: ICD-10-CM

## 2022-01-26 DIAGNOSIS — G89.29 CHRONIC RIGHT SHOULDER PAIN: ICD-10-CM

## 2022-01-26 PROCEDURE — 99214 OFFICE O/P EST MOD 30 MIN: CPT | Performed by: ORTHOPAEDIC SURGERY

## 2022-01-26 PROCEDURE — 1123F ACP DISCUSS/DSCN MKR DOCD: CPT | Performed by: ORTHOPAEDIC SURGERY

## 2022-01-26 PROCEDURE — 1090F PRES/ABSN URINE INCON ASSESS: CPT | Performed by: ORTHOPAEDIC SURGERY

## 2022-01-26 PROCEDURE — G8484 FLU IMMUNIZE NO ADMIN: HCPCS | Performed by: ORTHOPAEDIC SURGERY

## 2022-01-26 PROCEDURE — G8427 DOCREV CUR MEDS BY ELIG CLIN: HCPCS | Performed by: ORTHOPAEDIC SURGERY

## 2022-01-26 PROCEDURE — 4040F PNEUMOC VAC/ADMIN/RCVD: CPT | Performed by: ORTHOPAEDIC SURGERY

## 2022-01-26 PROCEDURE — G8420 CALC BMI NORM PARAMETERS: HCPCS | Performed by: ORTHOPAEDIC SURGERY

## 2022-01-26 PROCEDURE — G8400 PT W/DXA NO RESULTS DOC: HCPCS | Performed by: ORTHOPAEDIC SURGERY

## 2022-01-26 PROCEDURE — 3017F COLORECTAL CA SCREEN DOC REV: CPT | Performed by: ORTHOPAEDIC SURGERY

## 2022-01-26 PROCEDURE — 1036F TOBACCO NON-USER: CPT | Performed by: ORTHOPAEDIC SURGERY

## 2022-01-26 RX ORDER — PEN NEEDLE, DIABETIC 29 G X1/2"
NEEDLE, DISPOSABLE MISCELLANEOUS
COMMUNITY
Start: 2022-01-20

## 2022-01-26 RX ORDER — OLANZAPINE 10 MG/1
TABLET, ORALLY DISINTEGRATING ORAL
COMMUNITY
Start: 2022-01-21

## 2022-01-26 RX ORDER — POTASSIUM CHLORIDE 750 MG/1
TABLET, FILM COATED, EXTENDED RELEASE ORAL
COMMUNITY
Start: 2022-01-11

## 2022-01-26 RX ORDER — SEMAGLUTIDE 1.34 MG/ML
INJECTION, SOLUTION SUBCUTANEOUS
COMMUNITY
Start: 2022-01-24

## 2022-01-26 RX ORDER — QUETIAPINE FUMARATE 50 MG/1
TABLET, FILM COATED ORAL
COMMUNITY
Start: 2022-01-20

## 2022-01-26 NOTE — PROGRESS NOTES
12 [facility]  History and Physical  Shoulder Pain    Date:  2022    Name:  Melonie Nino  Address:  [address] [address]    :  1955      Age:   77 y.o.    SSN:  [id number]      Medical Record Number:  [id number]    Reason for Visit:    Follow-up (R shoulder)      HPI:   Melonie Nino is a 77 y.o. female who presents to our office today complaining of  right shoulder pain. This patient is already an established patient of ours. As a reminder, she has a history of undergoing a right reverse total shoulder arthroplasty on 2019 last seen for the shoulder in May 2020. At the time she was being worked up for prosthetic loosening and/or infection. Unfortunately the patient has not followed back up and reports today that she continues to have persistent pain that is around-the-clock. Its associated with or without movement. She is unable to barely use the right shoulder. This is affecting her activities of daily living. She also feels that her range of motion has gotten much worse. She denies any new injuries. She reports that most of her pain is over the anterior aspect. She denies any pain over the posterior shoulder. Two attempts of ultrasound-guided shoulder aspiration have failed to recover any joint fluid. Her ESR and CRP results are normal.  The patient has had a CT scan of the right shoulder and she is here today to review the results. Pain Assessment  Location of Pain: Shoulder  Location Modifiers: Right  Severity of Pain: 10  Quality of Pain: Aching,Dull,Sharp,Throbbing  Duration of Pain: Persistent  Frequency of Pain: Constant  Aggravating Factors:  Other (Comment)  Limiting Behavior: Yes  Relieving Factors: Rest  Work-Related Injury: No  Are there other pain locations you wish to document?: No    Review of Systems:  A 14 point review of systems available in the scanned medical record as documented by the patient. The review is negative with the exception of those things mentioned in the History of Present Illness and Past Medical History.       Past History:  Past Medical History:   Diagnosis Date    Anxiety     Arthritis     CAD (coronary artery disease)     Chronic back pain since 1990's    COPD (chronic obstructive pulmonary disease) (HCC)     Depression     Diabetes mellitus (HCC)     Fibromyalgia     Full dentures     GERD (gastroesophageal reflux disease)     Hyperlipidemia     Hypertension     IBS (irritable bowel syndrome)     Kidney failure 1999    r/t sepsis    MI (myocardial infarction) ([facility] [address] 2008    PACO (obstructive sleep apnea)     NO CPAP     Parkinson disease ([facility] [address]     Restless leg syndrome     Shoulder injury 10/07/2018    RT- recent fall     Thyroid disease      Past Surgical History:   Procedure Laterality Date    BRONCHOSCOPY  06/2017    [facility]    CARPAL TUNNEL RELEASE Left     CARPAL TUNNEL RELEASE Right 04/04/2018    COLONOSCOPY      CORONARY ANGIOPLASTY WITH STENT PLACEMENT      FRACTURE SURGERY Left     leg    HIP FRACTURE SURGERY Left     OPEN TREATMENT PROX HUMERAL FRACTURE Right 10/26/2018    RIGHT SHOULDER OPEN REDUCTION INTERNAL FIXATION PROXIMAL HUMERUS WITH TIBIAL ALLOGRAFT STRUT AND DBX MIX; LYSIS OF ADHESIONS; OPEN BICEPS TENODESIS, RIGHT SHOULDER ARTHROTOMY performed by Beata Nascimento MD at [address] [place] [address] N/A 08/17/2018    SUPERION IMPLANT LUMBAR THREE-FOUR, LUMBAR FOUR-FIVE    REVISION OF TOTAL SHOULDER Right 3/1/2019    RIGHT SHOULDER, LYSIS OF ADHESIONS, REMOVAL OF DEEP IMPLANTS, REVERSE TOTAL SHOULDER ARTHROPLASTY performed by Beata Nascimento MD at [address] [place] [address]       Current Outpatient Medications on File Prior to Visit   Medication Sig Dispense Refill    ULTICARE PEN NEEDLES 29G X 12.7MM MISC USE AS DIRECTED four times A DAY      potassium chloride (KLOR-CON) 10 MEQ extended release tablet       QUEtiapine (SEROQUEL) 50 MG tablet TAKE TWO TABLETS BY MOUTH AT BEDTIME AS NEEDED      OZEMPIC, 0.25 OR 0.5 MG/DOSE, 2 MG/1.5ML SOPN       OLANZapine zydis (ZYPREXA) 10 MG disintegrating tablet TAKE ONE TABLET BY MOUTH EVERY DAY      carbidopa-levodopa (SINEMET)  MG per tablet TAKE THREE TABLETS BY MOUTH THREE TIMES DAILY      cyclobenzaprine (FLEXERIL) 5 MG tablet TAKE ONE TABLET BY MOUTH THREE TIMES DAILY AS NEEDED FOR muscle spasm      furosemide (LASIX) 40 MG tablet TAKE ONE TABLET BY MOUTH EVERY DAY      NOVOLOG FLEXPEN 100 UNIT/ML injection pen inject SUBCUTANEOUSLY BEFORE meals THREE TIMES DAILY PER sliding scale max 40 UNITS DAILY      atorvastatin (LIPITOR) 20 MG tablet TAKE ONE TABLET BY MOUTH AT BEDTIME      BREZTRI AEROSPHERE 160-9-4.8 MCG/ACT AERO inhale TWO puffs BY MOUTH TWICE DAILY      Buprenorphine HCl (BELBUCA) 150 MCG FILM Place inside cheek.       clonazePAM (KLONOPIN) 0.5 MG tablet       diclofenac sodium (VOLTAREN) 1 % GEL apply FOUR GRAMS FOUR TIMES DAILY      JARDIANCE 25 MG tablet TAKE ONE TABLET BY MOUTH EVERY DAY      vitamin D (ERGOCALCIFEROL) 1.25 MG (50789 UT) CAPS capsule TAKE ONE CAPSULE BY MOUTH Every week on sunday      estradiol (VIVELLE) 0.1 MG/24HR APPLY ONE PATCH ON THE SKIN TWICE WEEKLY      gabapentin (NEURONTIN) 600 MG tablet TAKE ONE TABLET BY MOUTH THREE TIMES DAILY      hydrALAZINE (APRESOLINE) 100 MG tablet TAKE ONE TABLET BY MOUTH THREE TIMES DAILY      lisinopril (PRINIVIL;ZESTRIL) 20 MG tablet TAKE ONE TABLET BY MOUTH EVERY DAY      traMADol (ULTRAM) 50 MG tablet TAKE ONE TABLET BY MOUTH EVERY 8 HOURS AS NEEDED FOR PAIN      tiotropium (SPIRIVA RESPIMAT) 2.5 MCG/ACT AERS inhaler Inhale into the lungs daily      NIFEdipine (ADALAT CC) 90 MG extended release tablet TAKE ONE TABLET BY MOUTH EVERY DAY      loperamide (IMODIUM) 2 MG capsule       methocarbamol (ROBAXIN) 500 MG tablet Take 500 mg by mouth      levothyroxine (SYNTHROID) 100 MCG tablet Take 100 mcg by mouth Daily      Umeclidinium Bromide (INCRUSE ELLIPTA) 62.5 MCG/INH AEPB Inhale 1 puff into the lungs daily       benztropine (COGENTIN) 0.5 MG tablet Take 0.5 mg by mouth 2 times daily      Roflumilast (DALIRESP) 500 MCG tablet Take 500 mcg by mouth daily      insulin glargine (BASAGLAR KWIKPEN) 100 UNIT/ML injection pen Inject 30 Units into the skin nightly       dicyclomine (BENTYL) 10 MG capsule Take 10 mg by mouth 4 times daily (before meals and nightly) Indications: pt taking only once daily      omeprazole (PRILOSEC) 40 MG delayed release capsule Take 40 mg by mouth daily      albuterol sulfate  (90 Base) MCG/ACT inhaler Inhale 2 puffs into the lungs every 4-6 hours as needed for Wheezing      albuterol (PROVENTIL) (2.5 MG/3ML) 0.083% nebulizer solution Take 2.5 mg by nebulization daily      amLODIPine (NORVASC) 5 MG tablet Take 10 mg by mouth daily       aspirin (ASPIR-LOW) 81 MG EC tablet Take 81 mg by mouth daily       clopidogrel (PLAVIX) 75 MG tablet Take 75 mg by mouth      lurasidone (LATUDA) 60 MG TABS tablet Take 60 mg by mouth nightly       metoprolol tartrate (LOPRESSOR) 25 MG tablet Take 25 mg by mouth 2 times daily       montelukast (SINGULAIR) 10 MG tablet Take 10 mg by mouth nightly       rOPINIRole (REQUIP) 4 MG tablet TAKE 1 TABLET EVERY DAY AT NIGHT      VORTIoxetine (TRINTELLIX) 10 MG TABS tablet Take 10 mg by mouth daily       linagliptin (TRADJENTA) 5 MG tablet Take 5 mg by mouth daily        No current facility-administered medications on file prior to visit.      Social History     Socioeconomic History    Marital status: Legally      Spouse name: Not on file    Number of children: Not on file    Years of education: Not on file    Highest education level: Not on file   Occupational History    Not on file   Tobacco Use    Smoking status: Former Smoker     Years: 40.00     Quit date: 3/6/2014     Years since quittin.8    Smokeless tobacco: Never Used    Tobacco comment: still smokes at times   Vaping Use    Vaping Use: Never used   Substance and Sexual Activity    Alcohol use: Yes     Comment: rarely    Drug use: No    Sexual activity: Not on file   Other Topics Concern    Not on file   Social History Narrative    Not on file     Social Determinants of Health     Financial Resource Strain:     Difficulty of Paying Living Expenses: Not on file   Food Insecurity:     Worried About Running Out of Food in the Last Year: Not on file    Linnea of Food in the Last Year: Not on file   Transportation Needs:     Lack of Transportation (Medical): Not on file    Lack of Transportation (Non-Medical):  Not on file   Physical Activity:     Days of Exercise per Week: Not on file    Minutes of Exercise per Session: Not on file   Stress:     Feeling of Stress : Not on file   Social Connections:     Frequency of Communication with Friends and Family: Not on file    Frequency of Social Gatherings with Friends and Family: Not on file    Attends [employer] Services: Not on file    Active Member of [address] [place] [address] or Organizations: Not on file    Attends Club or Organization Meetings: Not on file    Marital Status: Not on file   Intimate Partner Violence:     Fear of Current or Ex-Partner: Not on file    Emotionally Abused: Not on file    Physically Abused: Not on file    Sexually Abused: Not on file   Housing Stability:     Unable to Pay for Housing in the Last Year: Not on file    Number of [place] in the Last Year: Not on file    Unstable Housing in the Last Year: Not on file     Family History   Problem Relation Age of Onset    Diabetes Mother     Stroke Mother     High Blood Pressure Mother     Diabetes Father     High Blood Pressure Father        Current Medications:    Current Outpatient Medications   Medication Sig Dispense Refill    ULTICARE PEN NEEDLES 29G X 12.7MM MISC USE AS DIRECTED four times A DAY      potassium chloride (KLOR-CON) 10 MEQ extended release tablet       QUEtiapine (SEROQUEL) 50 MG tablet TAKE TWO TABLETS BY MOUTH AT BEDTIME AS NEEDED      OZEMPIC, 0.25 OR 0.5 MG/DOSE, 2 MG/1.5ML SOPN       OLANZapine zydis (ZYPREXA) 10 MG disintegrating tablet TAKE ONE TABLET BY MOUTH EVERY DAY      carbidopa-levodopa (SINEMET)  MG per tablet TAKE THREE TABLETS BY MOUTH THREE TIMES DAILY      cyclobenzaprine (FLEXERIL) 5 MG tablet TAKE ONE TABLET BY MOUTH THREE TIMES DAILY AS NEEDED FOR muscle spasm      furosemide (LASIX) 40 MG tablet TAKE ONE TABLET BY MOUTH EVERY DAY      NOVOLOG FLEXPEN 100 UNIT/ML injection pen inject SUBCUTANEOUSLY BEFORE meals THREE TIMES DAILY PER sliding scale max 40 UNITS DAILY      atorvastatin (LIPITOR) 20 MG tablet TAKE ONE TABLET BY MOUTH AT BEDTIME      BREZTRI AEROSPHERE 160-9-4.8 MCG/ACT AERO inhale TWO puffs BY MOUTH TWICE DAILY      Buprenorphine HCl (BELBUCA) 150 MCG FILM Place inside cheek.       clonazePAM (KLONOPIN) 0.5 MG tablet       diclofenac sodium (VOLTAREN) 1 % GEL apply FOUR GRAMS FOUR TIMES DAILY      JARDIANCE 25 MG tablet TAKE ONE TABLET BY MOUTH EVERY DAY      vitamin D (ERGOCALCIFEROL) 1.25 MG (51330 UT) CAPS capsule TAKE ONE CAPSULE BY MOUTH Every week on sunday      estradiol (VIVELLE) 0.1 MG/24HR APPLY ONE PATCH ON THE SKIN TWICE WEEKLY      gabapentin (NEURONTIN) 600 MG tablet TAKE ONE TABLET BY MOUTH THREE TIMES DAILY      hydrALAZINE (APRESOLINE) 100 MG tablet TAKE ONE TABLET BY MOUTH THREE TIMES DAILY      lisinopril (PRINIVIL;ZESTRIL) 20 MG tablet TAKE ONE TABLET BY MOUTH EVERY DAY      traMADol (ULTRAM) 50 MG tablet TAKE ONE TABLET BY MOUTH EVERY 8 HOURS AS NEEDED FOR PAIN      tiotropium (SPIRIVA RESPIMAT) 2.5 MCG/ACT AERS inhaler Inhale into the lungs daily      NIFEdipine (ADALAT CC) 90 MG extended release tablet TAKE ONE TABLET BY MOUTH EVERY DAY      loperamide (IMODIUM) 2 MG capsule       methocarbamol (ROBAXIN) 500 MG tablet Take 500 mg by mouth      levothyroxine (SYNTHROID) 100 MCG tablet Take 100 mcg by mouth Daily      Umeclidinium Bromide (INCRUSE ELLIPTA) 62.5 MCG/INH AEPB Inhale 1 puff into the lungs daily       benztropine (COGENTIN) 0.5 MG tablet Take 0.5 mg by mouth 2 times daily      Roflumilast (DALIRESP) 500 MCG tablet Take 500 mcg by mouth daily      insulin glargine (BASAGLAR KWIKPEN) 100 UNIT/ML injection pen Inject 30 Units into the skin nightly       dicyclomine (BENTYL) 10 MG capsule Take 10 mg by mouth 4 times daily (before meals and nightly) Indications: pt taking only once daily      omeprazole (PRILOSEC) 40 MG delayed release capsule Take 40 mg by mouth daily      albuterol sulfate  (90 Base) MCG/ACT inhaler Inhale 2 puffs into the lungs every 4-6 hours as needed for Wheezing      albuterol (PROVENTIL) (2.5 MG/3ML) 0.083% nebulizer solution Take 2.5 mg by nebulization daily      amLODIPine (NORVASC) 5 MG tablet Take 10 mg by mouth daily       aspirin (ASPIR-LOW) 81 MG EC tablet Take 81 mg by mouth daily       clopidogrel (PLAVIX) 75 MG tablet Take 75 mg by mouth      lurasidone (LATUDA) 60 MG TABS tablet Take 60 mg by mouth nightly       metoprolol tartrate (LOPRESSOR) 25 MG tablet Take 25 mg by mouth 2 times daily       montelukast (SINGULAIR) 10 MG tablet Take 10 mg by mouth nightly       rOPINIRole (REQUIP) 4 MG tablet TAKE 1 TABLET EVERY DAY AT NIGHT      VORTIoxetine (TRINTELLIX) 10 MG TABS tablet Take 10 mg by mouth daily       linagliptin (TRADJENTA) 5 MG tablet Take 5 mg by mouth daily        No current facility-administered medications for this visit. Allergies: Allergies   Allergen Reactions    Combivent Respimat [Ipratropium-Albuterol]      Nose bleeds  SHE SAID SHE CAN TAKE ALBUTEROL    Metformin Other (See Comments)     Effects kidneys    Primidone Other (See Comments)     Other reaction(s):  Other (See Comments)  Loss of muscle control  COULDN'T USE HER MUSCLES    Quinidine Hives    Sulfa Antibiotics Rash       Physical Exam:  There were no vitals filed for this visit. General: Florinda Ramirez is a healthy and well appearing 77 y.o. female who is sitting comfortably in our office in acute distress. General Exam:   Constitutional: Patient is adequately groomed with no evidence of malnutrition  DTRs: Deep tendon reflexes are intact  Mental Status: The patient is oriented to time, place and person. The patient's mood and affect are appropriate. Lymphatic: The lymphatic examination bilaterally reveals all areas to be without enlargement or induration. Vascular: Examination reveals no swelling or calf tenderness. Peripheral pulses are palpable and 2+. Neurological: The patient has good coordination. There is no weakness or sensory deficit. Neuro: alert. Oriented X 3  Eyes: Extra-ocular muscles intact  Mouth: Oral mucosa moist. No perioral lesions  Pulm: Respirations unlabored and regular. right Shoulder Exam:  Inspection:  No gross deformities, deltoid muscle atrophy, no signs of infection. Palpation: No grinding or crepitus to passive movement. She does have tenderness over the anterior shoulder that is diffuse. She has no tenderness over the scapular spine. Active Range of Motion: Forward elevation of 30, external rotation with elbow at the side 20, internal rotation to the back is back pocket    Passive Range of Motion: Passively forward elevation can be further increased to to 60. Strength: Deferred    Special Tests:   No Vargas muscle deformity. Neurovascular: Sensation to light touch is intact, no motor deficits, palpable radial pulses 2+    Laboratory:  No visits with results within 14 Day(s) from this visit.    Latest known visit with results is:   Orders Only on 11/16/2021   Component Date Value    Sed Rate 11/16/2021 18     CRP 11/16/2021 <3.0       No results found for this or any previous visit (from the past 24 hour(s)). Radiographic:  3 xray views of the right  shoulder including True AP in internal and external and axillary lateral were taken in our office today reveals some changers over the humeral component concerning for loosening. This is similar to her prior radiographs. CT RIGHT Shoulder (mislabelled on Epic), 5/26/2020  FINDINGS:  Status post reverse shoulder arthroplasty.  No radiolucency around prosthesis to    suggest loosening.  Deformity of humeral diaphysis consistent with prior fracture with    malunion.  The inferior aspect of the humeral component abutting scapula with focal osseous    erosion.  No fracture or displacement of prosthesis.  The metaglene is flush with the native    glenoid cup.  Moderate atrophy of subscapularis muscle.       No cortical fracture.  A wide AC joint space.       No fracture of the ribs.       CONCLUSION:   1. Status post reverse arthroplasty of the shoulder.  Probable erosion of inferior humeral    component into inferior scapula. 2. 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 CT scan 12/28/2021:  Impression:  1-intact, well articulated reverse total shoulder arthroplasty. Unchanged proximal humeral and glenoid deformity with inferior bony glenoid in near contact with the medial aspect of the humeral prostatic cup. Assessment:  Adore Goldsmith is a 77 y.o. female with history of undergoing a right reverse total shoulder arthroplasty on 3/1/2019 has been having persistent symptoms of pain and worsening range of motion for over a year. The infection work-up including ESR CRP and dry joint revealed no signs of infection. CT scan did not show any component lucency. The tip of the humeral stem is touching the diaphyseal cortex. Impression:  Encounter Diagnoses   Name Primary?     S/P reverse total shoulder arthroplasty, right Yes    Chronic right shoulder pain        Office Procedures:  No orders of the defined types were placed in this encounter. Plan: At this time we recommend that the patient continues her exercises at home. We would like to see her back in 6 months with a new set of x-rays. We discussed that the tip of the stem is attaching the diaphyseal cortex however her pain is more on the anterior aspect of the shoulder joint. We need to continue careful observation of the humeral stem. She was agreeable to the above plan. All her questions were fully answered today. Piotr Santiago MD  [facility] Clinical fellow  1/26/2022  2:22 PM    During this examination, I, Piotr Santiago MD, functioned as a scribe for Dr. Shana Joyce. The history taking and physical examination were performed by Dr. Peña Dimas. All counseling during the appointment was performed between the patient and Dr. Peña Dimas.   ________________  I was physically present and personally supervised the Orthopaedic Sports Medicine Fellow in the evaluation and development of a treatment plan for this patient. I personally interviewed the patient and performed a physical examination. In addition, I discussed the patient's condition and treatment options with them. I have also reviewed and agree with the past medical, family and social history unless otherwise noted. All of the patient's questions were answered. Shiv Dimas MD, PhD  1/26/2022

## 2022-03-01 PROBLEM — N17.9 AKI (ACUTE KIDNEY INJURY) (HCC): Status: ACTIVE | Noted: 2022-03-01

## 2022-10-12 ENCOUNTER — OFFICE VISIT (OUTPATIENT)
Dept: ORTHOPEDIC SURGERY | Age: 67
End: 2022-10-12
Payer: MEDICAID

## 2022-10-12 DIAGNOSIS — Z96.611 S/P REVERSE TOTAL SHOULDER ARTHROPLASTY, RIGHT: Primary | ICD-10-CM

## 2022-10-12 PROCEDURE — 3017F COLORECTAL CA SCREEN DOC REV: CPT | Performed by: ORTHOPAEDIC SURGERY

## 2022-10-12 PROCEDURE — 1090F PRES/ABSN URINE INCON ASSESS: CPT | Performed by: ORTHOPAEDIC SURGERY

## 2022-10-12 PROCEDURE — 1036F TOBACCO NON-USER: CPT | Performed by: ORTHOPAEDIC SURGERY

## 2022-10-12 PROCEDURE — 99214 OFFICE O/P EST MOD 30 MIN: CPT | Performed by: ORTHOPAEDIC SURGERY

## 2022-10-12 PROCEDURE — G8427 DOCREV CUR MEDS BY ELIG CLIN: HCPCS | Performed by: ORTHOPAEDIC SURGERY

## 2022-10-12 PROCEDURE — 1123F ACP DISCUSS/DSCN MKR DOCD: CPT | Performed by: ORTHOPAEDIC SURGERY

## 2022-10-12 PROCEDURE — G8420 CALC BMI NORM PARAMETERS: HCPCS | Performed by: ORTHOPAEDIC SURGERY

## 2022-10-12 PROCEDURE — G8400 PT W/DXA NO RESULTS DOC: HCPCS | Performed by: ORTHOPAEDIC SURGERY

## 2022-10-12 PROCEDURE — G8484 FLU IMMUNIZE NO ADMIN: HCPCS | Performed by: ORTHOPAEDIC SURGERY

## 2022-10-13 NOTE — PROGRESS NOTES
12 [facility]  History and Physical  Shoulder Pain    Date:  10/12/2022    Name:  Nica Curtis  Address:  [address] [place] [address] [address] [address]    :  1955      Age:   79 y.o.    SSN:  [id number]      Medical Record Number:  [id number]    Reason for Visit:    Follow-up (Right Shoulder)      HPI:   Nica Curtis is a 79 y.o. female who presents to our office today complaining of  right shoulder pain. This patient is already an established patient of ours. She has a history of undergoing a right reverse total shoulder arthroplasty on 2019. She was last seen for the shoulder in 2022. At the time she was being worked up for prosthetic loosening and/or infection. Unfortunately the patient has not followed back up and reports today that she continues to have persistent pain that is around-the-clock. Its associated with or without movement. She is unable to use or even move the right shoulder because of her pain. This is affecting her activities of daily living. She reports that most of her pain is over the anterior and posterior aspect. She denies any pain over the posterior shoulder. Two prior attempts of ultrasound-guided shoulder aspiration have failed to recover any joint fluid. Her ESR and CRP results are normal.  The patient has had a CT scan of the right shoulder and she is here today to review the results. Patient was also under the care of a pain management physician and was recently discharged from their care. Pain Assessment  Location of Pain: Shoulder  Location Modifiers: Right  Severity of Pain: 8  Quality of Pain: Throbbing, Sharp, Aching  Duration of Pain: Persistent  Frequency of Pain: Constant    Review of Systems:  A 14 point review of systems available in the scanned medical record as documented by the patient.   The review is negative with the exception of those things mentioned in the History of Present Illness and Past Medical History.       Past History:  Past Medical History:   Diagnosis Date    Anxiety     Arthritis     CAD (coronary artery disease)     Chronic back pain since 1990's    COPD (chronic obstructive pulmonary disease) ([facility])     Depression     Diabetes mellitus (HCC)     Fibromyalgia     Full dentures     GERD (gastroesophageal reflux disease)     Hyperlipidemia     Hypertension     IBS (irritable bowel syndrome)     Kidney failure 1999    r/t sepsis    MI (myocardial infarction) ([facility] [address] 2008    PACO (obstructive sleep apnea)     NO CPAP     Parkinson disease ([facility] [address]     Restless leg syndrome     Shoulder injury 10/07/2018    RT- recent fall     Thyroid disease      Past Surgical History:   Procedure Laterality Date    BRONCHOSCOPY  06/2017    [facility]    CARPAL TUNNEL RELEASE Left     CARPAL TUNNEL RELEASE Right 04/04/2018    COLONOSCOPY      CORONARY ANGIOPLASTY WITH STENT PLACEMENT      FRACTURE SURGERY Left     leg    HIP FRACTURE SURGERY Left     OPEN TREATMENT PROX HUMERAL FRACTURE Right 10/26/2018    RIGHT SHOULDER OPEN REDUCTION INTERNAL FIXATION PROXIMAL HUMERUS WITH TIBIAL ALLOGRAFT STRUT AND DBX MIX; LYSIS OF ADHESIONS; OPEN BICEPS TENODESIS, RIGHT SHOULDER ARTHROTOMY performed by Evonne Runner, MD at [address] N/A 08/17/2018    SUPERION IMPLANT LUMBAR THREE-FOUR, LUMBAR FOUR-FIVE    REVISION OF TOTAL SHOULDER Right 3/1/2019    RIGHT SHOULDER, LYSIS OF ADHESIONS, REMOVAL OF DEEP IMPLANTS, REVERSE TOTAL SHOULDER ARTHROPLASTY performed by Evonne Runner, MD at [address] [place] [address]       Current Outpatient Medications on File Prior to Visit   Medication Sig Dispense Refill    ULTICARE PEN NEEDLES 29G X 12.7MM MISC USE AS DIRECTED four times A DAY      potassium chloride (KLOR-CON) 10 MEQ extended release tablet       QUEtiapine (SEROQUEL) 50 MG tablet TAKE TWO TABLETS BY MOUTH AT BEDTIME AS NEEDED      OZEMPIC, 0.25 OR 0.5 MG/DOSE, 2 MG/1.5ML SOPN OLANZapine zydis (ZYPREXA) 10 MG disintegrating tablet TAKE ONE TABLET BY MOUTH EVERY DAY      carbidopa-levodopa (SINEMET)  MG per tablet TAKE THREE TABLETS BY MOUTH THREE TIMES DAILY      cyclobenzaprine (FLEXERIL) 5 MG tablet TAKE ONE TABLET BY MOUTH THREE TIMES DAILY AS NEEDED FOR muscle spasm      furosemide (LASIX) 40 MG tablet TAKE ONE TABLET BY MOUTH EVERY DAY      NOVOLOG FLEXPEN 100 UNIT/ML injection pen inject SUBCUTANEOUSLY BEFORE meals THREE TIMES DAILY PER sliding scale max 40 UNITS DAILY      atorvastatin (LIPITOR) 20 MG tablet TAKE ONE TABLET BY MOUTH AT BEDTIME      BREZTRI AEROSPHERE 160-9-4.8 MCG/ACT AERO inhale TWO puffs BY MOUTH TWICE DAILY      Buprenorphine HCl (BELBUCA) 150 MCG FILM Place inside cheek.       clonazePAM (KLONOPIN) 0.5 MG tablet       diclofenac sodium (VOLTAREN) 1 % GEL apply FOUR GRAMS FOUR TIMES DAILY      JARDIANCE 25 MG tablet TAKE ONE TABLET BY MOUTH EVERY DAY      vitamin D (ERGOCALCIFEROL) 1.25 MG (42356 UT) CAPS capsule TAKE ONE CAPSULE BY MOUTH Every week on sunday      estradiol (VIVELLE) 0.1 MG/24HR APPLY ONE PATCH ON THE SKIN TWICE WEEKLY      gabapentin (NEURONTIN) 600 MG tablet TAKE ONE TABLET BY MOUTH THREE TIMES DAILY      hydrALAZINE (APRESOLINE) 100 MG tablet TAKE ONE TABLET BY MOUTH THREE TIMES DAILY      lisinopril (PRINIVIL;ZESTRIL) 20 MG tablet TAKE ONE TABLET BY MOUTH EVERY DAY      traMADol (ULTRAM) 50 MG tablet TAKE ONE TABLET BY MOUTH EVERY 8 HOURS AS NEEDED FOR PAIN      tiotropium (SPIRIVA RESPIMAT) 2.5 MCG/ACT AERS inhaler Inhale into the lungs daily      NIFEdipine (ADALAT CC) 90 MG extended release tablet TAKE ONE TABLET BY MOUTH EVERY DAY      loperamide (IMODIUM) 2 MG capsule       methocarbamol (ROBAXIN) 500 MG tablet Take 500 mg by mouth      levothyroxine (SYNTHROID) 100 MCG tablet Take 100 mcg by mouth Daily      Umeclidinium Bromide (INCRUSE ELLIPTA) 62.5 MCG/INH AEPB Inhale 1 puff into the lungs daily       benztropine (COGENTIN) 0.5 MG tablet Take 0.5 mg by mouth 2 times daily      Roflumilast (DALIRESP) 500 MCG tablet Take 500 mcg by mouth daily      insulin glargine (BASAGLAR KWIKPEN) 100 UNIT/ML injection pen Inject 30 Units into the skin nightly       dicyclomine (BENTYL) 10 MG capsule Take 10 mg by mouth 4 times daily (before meals and nightly) Indications: pt taking only once daily      omeprazole (PRILOSEC) 40 MG delayed release capsule Take 40 mg by mouth daily      albuterol sulfate  (90 Base) MCG/ACT inhaler Inhale 2 puffs into the lungs every 4-6 hours as needed for Wheezing      albuterol (PROVENTIL) (2.5 MG/3ML) 0.083% nebulizer solution Take 2.5 mg by nebulization daily      amLODIPine (NORVASC) 5 MG tablet Take 10 mg by mouth daily       aspirin (ASPIR-LOW) 81 MG EC tablet Take 81 mg by mouth daily       clopidogrel (PLAVIX) 75 MG tablet Take 75 mg by mouth      lurasidone (LATUDA) 60 MG TABS tablet Take 60 mg by mouth nightly       metoprolol tartrate (LOPRESSOR) 25 MG tablet Take 25 mg by mouth 2 times daily       montelukast (SINGULAIR) 10 MG tablet Take 10 mg by mouth nightly       rOPINIRole (REQUIP) 4 MG tablet TAKE 1 TABLET EVERY DAY AT NIGHT      VORTIoxetine (TRINTELLIX) 10 MG TABS tablet Take 10 mg by mouth daily       linagliptin (TRADJENTA) 5 MG tablet Take 5 mg by mouth daily        No current facility-administered medications on file prior to visit.      Social History     Socioeconomic History    Marital status: Legally      Spouse name: Not on file    Number of children: Not on file    Years of education: Not on file    Highest education level: Not on file   Occupational History    Not on file   Tobacco Use    Smoking status: Former     Years: 40.00     Types: Cigarettes     Quit date: 3/6/2014     Years since quittin.6    Smokeless tobacco: Never    Tobacco comments:     still smokes at times   Vaping Use    Vaping Use: Never used   Substance and Sexual Activity    Alcohol use: Yes     Comment: rarely    Drug use: No    Sexual activity: Not on file   Other Topics Concern    Not on file   Social History Narrative    Not on file     Social Determinants of Health     Financial Resource Strain: Not on file   Food Insecurity: Not on file   Transportation Needs: Not on file   Physical Activity: Not on file   Stress: Not on file   Social Connections: Not on file   Intimate Partner Violence: Not on file   Housing Stability: Not on file     Family History   Problem Relation Age of Onset    Diabetes Mother     Stroke Mother     High Blood Pressure Mother     Diabetes Father     High Blood Pressure Father        Current Medications:    Current Outpatient Medications   Medication Sig Dispense Refill    ULTICARE PEN NEEDLES 29G X 12.7MM MISC USE AS DIRECTED four times A DAY      potassium chloride (KLOR-CON) 10 MEQ extended release tablet       QUEtiapine (SEROQUEL) 50 MG tablet TAKE TWO TABLETS BY MOUTH AT BEDTIME AS NEEDED      OZEMPIC, 0.25 OR 0.5 MG/DOSE, 2 MG/1.5ML SOPN       OLANZapine zydis (ZYPREXA) 10 MG disintegrating tablet TAKE ONE TABLET BY MOUTH EVERY DAY      carbidopa-levodopa (SINEMET)  MG per tablet TAKE THREE TABLETS BY MOUTH THREE TIMES DAILY      cyclobenzaprine (FLEXERIL) 5 MG tablet TAKE ONE TABLET BY MOUTH THREE TIMES DAILY AS NEEDED FOR muscle spasm      furosemide (LASIX) 40 MG tablet TAKE ONE TABLET BY MOUTH EVERY DAY      NOVOLOG FLEXPEN 100 UNIT/ML injection pen inject SUBCUTANEOUSLY BEFORE meals THREE TIMES DAILY PER sliding scale max 40 UNITS DAILY      atorvastatin (LIPITOR) 20 MG tablet TAKE ONE TABLET BY MOUTH AT BEDTIME      BROCKTRI AEROSPHERE 160-9-4.8 MCG/ACT AERO inhale TWO puffs BY MOUTH TWICE DAILY      Buprenorphine HCl (BELBUCA) 150 MCG FILM Place inside cheek.       clonazePAM (KLONOPIN) 0.5 MG tablet       diclofenac sodium (VOLTAREN) 1 % GEL apply FOUR GRAMS FOUR TIMES DAILY      JARDIANCE 25 MG tablet TAKE ONE TABLET BY MOUTH EVERY DAY      vitamin D (ERGOCALCIFEROL) 1.25 MG (66340 UT) CAPS capsule TAKE ONE CAPSULE BY MOUTH Every week on sunday      estradiol (VIVELLE) 0.1 MG/24HR APPLY ONE PATCH ON THE SKIN TWICE WEEKLY      gabapentin (NEURONTIN) 600 MG tablet TAKE ONE TABLET BY MOUTH THREE TIMES DAILY      hydrALAZINE (APRESOLINE) 100 MG tablet TAKE ONE TABLET BY MOUTH THREE TIMES DAILY      lisinopril (PRINIVIL;ZESTRIL) 20 MG tablet TAKE ONE TABLET BY MOUTH EVERY DAY      traMADol (ULTRAM) 50 MG tablet TAKE ONE TABLET BY MOUTH EVERY 8 HOURS AS NEEDED FOR PAIN      tiotropium (SPIRIVA RESPIMAT) 2.5 MCG/ACT AERS inhaler Inhale into the lungs daily      NIFEdipine (ADALAT CC) 90 MG extended release tablet TAKE ONE TABLET BY MOUTH EVERY DAY      loperamide (IMODIUM) 2 MG capsule       methocarbamol (ROBAXIN) 500 MG tablet Take 500 mg by mouth      levothyroxine (SYNTHROID) 100 MCG tablet Take 100 mcg by mouth Daily      Umeclidinium Bromide (INCRUSE ELLIPTA) 62.5 MCG/INH AEPB Inhale 1 puff into the lungs daily       benztropine (COGENTIN) 0.5 MG tablet Take 0.5 mg by mouth 2 times daily      Roflumilast (DALIRESP) 500 MCG tablet Take 500 mcg by mouth daily      insulin glargine (BASAGLAR KWIKPEN) 100 UNIT/ML injection pen Inject 30 Units into the skin nightly       dicyclomine (BENTYL) 10 MG capsule Take 10 mg by mouth 4 times daily (before meals and nightly) Indications: pt taking only once daily      omeprazole (PRILOSEC) 40 MG delayed release capsule Take 40 mg by mouth daily      albuterol sulfate  (90 Base) MCG/ACT inhaler Inhale 2 puffs into the lungs every 4-6 hours as needed for Wheezing      albuterol (PROVENTIL) (2.5 MG/3ML) 0.083% nebulizer solution Take 2.5 mg by nebulization daily      amLODIPine (NORVASC) 5 MG tablet Take 10 mg by mouth daily       aspirin (ASPIR-LOW) 81 MG EC tablet Take 81 mg by mouth daily       clopidogrel (PLAVIX) 75 MG tablet Take 75 mg by mouth      lurasidone (LATUDA) 60 MG TABS tablet Take 60 mg by mouth nightly       metoprolol tartrate (LOPRESSOR) 25 MG tablet Take 25 mg by mouth 2 times daily       montelukast (SINGULAIR) 10 MG tablet Take 10 mg by mouth nightly       rOPINIRole (REQUIP) 4 MG tablet TAKE 1 TABLET EVERY DAY AT NIGHT      VORTIoxetine (TRINTELLIX) 10 MG TABS tablet Take 10 mg by mouth daily       linagliptin (TRADJENTA) 5 MG tablet Take 5 mg by mouth daily        No current facility-administered medications for this visit. Allergies: Allergies   Allergen Reactions    Albuterol Sulfate     Combivent Respimat [Ipratropium-Albuterol]      Nose bleeds  SHE SAID SHE CAN TAKE ALBUTEROL    Metformin Other (See Comments)     Effects kidneys    Primidone Other (See Comments)     Other reaction(s): Other (See Comments)  Loss of muscle control  COULDN'T USE HER MUSCLES    Quinidine Hives    Sulfa Antibiotics Rash       Physical Exam:  There were no vitals filed for this visit. General: Jolene Meigs is a healthy and well appearing 79 y.o. female who is sitting comfortably in our office in acute distress. General Exam:   Constitutional: Patient is adequately groomed with no evidence of malnutrition  DTRs: Deep tendon reflexes are intact  Mental Status: The patient is oriented to time, place and person. The patient's mood and affect are appropriate. Lymphatic: The lymphatic examination bilaterally reveals all areas to be without enlargement or induration. Vascular: Examination reveals no swelling or calf tenderness. Peripheral pulses are palpable and 2+. Neurological: The patient has good coordination. There is no weakness or sensory deficit. Neuro: alert. Oriented X 3  Eyes: Extra-ocular muscles intact  Mouth: Oral mucosa moist. No perioral lesions  Pulm: Respirations unlabored and regular. right Shoulder Exam:  Inspection:  No gross deformities, deltoid muscle atrophy, no signs of infection. Palpation: No grinding or crepitus to passive movement.   She has diffuse tenderness over the scapular spine and over the anterior shoulder    Active Range of Motion: Forward elevation of 30, external rotation with elbow at the side 5, internal rotation to the back is back pocket    Passive Range of Motion: Passively forward elevation can be further increased to to 80. Strength: Deferred    Special Tests:   No Vargas muscle deformity. Neurovascular: Sensation to light touch is intact, no motor deficits, palpable radial pulses 2+    Laboratory:  No visits with results within 14 Day(s) from this visit. Latest known visit with results is:   Orders Only on 11/16/2021   Component Date Value    Sed Rate 11/16/2021 18     CRP 11/16/2021 <3.0       No results found for this or any previous visit (from the past 24 hour(s)). Radiographic:  3 xray views of the right  shoulder including True AP in internal and external and axillary lateral were taken in our office today reveals a reverse total shoulder arthroplasty. No obvious concerns are noted. This is similar to her prior radiographs. CT RIGHT Shoulder (mislabelled on Epic), 5/26/2020  CONCLUSION:   1. Status post reverse arthroplasty of the shoulder. Probable erosion of inferior humeral    component into inferior scapula. 2. 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 CT scan 12/28/2021:  Impression:  1-intact, well articulated reverse total shoulder arthroplasty. Unchanged proximal humeral and glenoid deformity with inferior bony glenoid in near contact with the medial aspect of the humeral prostatic cup. Assessment:  Jolene Meigs is a 79 y.o. female with history of undergoing a right reverse total shoulder arthroplasty on 3/1/2019 has been having persistent symptoms of pain and worsening range of motion for over a year. Impression:  Encounter Diagnosis   Name Primary?     S/P reverse total shoulder arthroplasty, right Yes       Office Procedures:  Orders Placed This Encounter   Procedures    XR SHOULDER RIGHT (MIN 2 VIEWS)     Standing Status: Future     Number of Occurrences:   1     Standing Expiration Date:   10/12/2023     Order Specific Question:   Reason for exam:     Answer:   pain    FL ARTHR/ASP/INJ MAJOR JT/BURSA LT WO US     Standing Status:   Future     Standing Expiration Date:   10/12/2023     Order Specific Question:   Reason for exam:     Answer:   Right shoulder    Marty Barillas MD, Pain Management, [facility]     Referral Priority:   Routine     Referral Type:   Eval and Treat     Referral Reason:   Specialty Services Required     Referred to Provider:   Waqas Rivera MD     Requested Specialty:   Pain Management     Number of Visits Requested:   1       Plan: At this time we recommend obtaining a ultrasound-guided aspiration of her joint and for that to be sent to [facility] for infection work-up including ESR CRP and dry joint revealed no signs of infection. The orders were placed for that. The patient was given information on how to get this done. Additionally we will send in a referral to Dr. Steph Fierro for chronic pain management. She was agreeable to the above plan. All her questions were fully answered today. Greater than 30 minutes were expended on all aspects of today's encounter. 10/12/2022    Jovanni Ramos PA-C  Orthopaedic Sports Medicine Physician Assistant    During this examination, Jovanni SANCHEZ PA-C, functioned as a scribe for Dr. Fede Patrick. This dictation was performed with a verbal recognition program (DRAGON) and it was checked for errors. It is possible that there are still dictated errors within this office note. If so, please bring any errors to my attention for an addendum. All efforts were made to ensure that this office note is accurate.  _____________  I, Dr. Fede Patrick, personally performed the services described in this documentation as described by Jovanni Ramos PA-C in my presence, and it is both accurate and complete. Shiv Olivia MD, PhD  10/12/2022

## 2022-10-14 ENCOUNTER — TELEPHONE (OUTPATIENT)
Dept: ORTHOPEDIC SURGERY | Age: 67
End: 2022-10-14

## 2022-10-14 VITALS — BODY MASS INDEX: 22.09 KG/M2 | WEIGHT: 117 LBS | HEIGHT: 61 IN

## 2022-10-14 NOTE — TELEPHONE ENCOUNTER
Prescription Refill     Medication Name:  PAIN  Pharmacy: [address] [place] [address] [place]    Patient Contact Number:  [phone]

## 2025-01-03 NOTE — PROGRESS NOTES
Patient reached ____ yes  ___X__ no         MY Chart message sent  _____   instructions left __X__ yes   phone number ____X____                                ____ no-office notified          Date _1/8/25________  Time __1145_____  Arrival __1015  hosp-[facility]_    Nothing to eat or drink after midnight-follow your doctors prep instructions-this may include taking a second dose of your prep after midnight  Responsible adult 18 or older to stay on site while you are here-drive you home-stay with you after  Follow any instructions your doctors office has given you  Bring a complete list of all your medications and supplements including name,dose,how often taken the day of your procedure  If you normally take the following medications in the morning please do so the AM of your procedure with a small sip of water       Heart,blood pressure,seizure,thyroid or breathing medications-use your inhalers-bring any rescue inhalers with you DOS       DO NOT take blood pressure medications ending in \"amy\" or \"pril\" the AM of procedure or evening prior  Dr Martínez patients are not to take any medications the AM of surgery  Take half or your normal dose of any long acting insulins the night before your procedure-do not take any diabetic medications the AM of procedure. If you take a weekly injection for diabetes or weight loss-do not take one week prior to surgery/procedure.If you have already taken your injection this week,contact your surgeon- INSTRUCTIONS TO STOP OZEMPIC 7 DAYS PRIOR TO PROCEDURE  Follow your doctors instructions regarding stopping or taking  any blood thinners-if you do not have instructions-call them- INSTRUCTIONS TO CHECK PLAVIX  Any questions call your doctor  Other ____CHECK WITH PAIN REGARDING WHETHER OR NOT TO STOP BUPRENORPHINE__________________________________________________________      VISITOR POLICY(subject to change)             The current policy is 2 visitors per patient.There are no

## 2025-01-07 NOTE — OP NOTE
Normal duodenum.The villous pattern appeared normal, but given symptoms, multiple small bowel biopsies were obtained to evaluate for celiac disease.    Next, the curvilinear array echoendoscope was advanced without difficulty to the 2nd portion of the duodenum.  Endosonographic views were good, patient toleration was good.     Findings:   Major Papilla: Endoscopically, the major papilla was normal.  Endosonographically, the major papilla appeared normal  Pancreas: The pancreatic duct measured 4.2mm off the major papilla in the head of the pancreas, 3.1mm in the body of the pancreas, and tapered in the tail of the pancreas.  The pancreatic parenchyma was full of multiple pancreatic cysts throughout the gland with the largest 2.05 x 1.03 cm in the neck of the pancreas.  The cysts had no intramural nodules.    Bile duct: The bile duct was 2.7mm off the major papilla and 8.1mm in the mid bile duct.  There were no shadowing stones or strictures.  Liver: The liver appeared normal in the left lobe  L adrenal: Normal.  Doppler evaluation of the celiac artery, splenic artery, hepatic artery, superior mesenteric artery, superior mesenteric vein, portal vein, and splenic vein was normal.    The duodenum and stomach were decompressed and the scope was withdrawn from the patient.  The patient tolerated the procedure well and was taken to the post anesthesia care unit in good condition.    Doppler Interpretation:  Doppler evaluation was performed and interpreted on the spot by the endoscopist without the presence of a radiologist.      Specimens taken: yes  Estimated blood loss: minimal      Impression:   Moderate gastritis biopsied  3cm sliding hiatal hernia.  Otherwise normal EGD.  Small bowel biopsies obtained.   Multiple small pancreatic cysts throughout the pancreas.  Largest 2.05 cm.  There were no intramural nodules.  These are likely side branch IPMN's  Mild dilation of the main pancreatic duct without strictures, stones

## 2025-01-08 ENCOUNTER — ANESTHESIA EVENT (OUTPATIENT)
Dept: ENDOSCOPY | Age: 70
End: 2025-01-08
Payer: COMMERCIAL

## 2025-01-08 ENCOUNTER — ANESTHESIA (OUTPATIENT)
Dept: ENDOSCOPY | Age: 70
End: 2025-01-08
Payer: COMMERCIAL

## 2025-01-08 ENCOUNTER — HOSPITAL ENCOUNTER (OUTPATIENT)
Age: 70
Setting detail: OUTPATIENT SURGERY
Discharge: HOME | End: 2025-01-08
Attending: INTERNAL MEDICINE | Admitting: INTERNAL MEDICINE
Payer: COMMERCIAL

## 2025-01-08 VITALS
HEART RATE: 88 BPM | SYSTOLIC BLOOD PRESSURE: 156 MMHG | BODY MASS INDEX: 18.88 KG/M2 | TEMPERATURE: 97.2 F | OXYGEN SATURATION: 95 % | WEIGHT: 100 LBS | RESPIRATION RATE: 17 BRPM | HEIGHT: 61 IN | DIASTOLIC BLOOD PRESSURE: 74 MMHG

## 2025-01-08 DIAGNOSIS — K86.2 PANCREATIC CYST: ICD-10-CM

## 2025-01-08 LAB
GLUCOSE BLD-MCNC: 175 MG/DL (ref 70–99)
PERFORMED ON: ABNORMAL

## 2025-01-08 PROCEDURE — C1753 CATH, INTRAVAS ULTRASOUND: HCPCS | Performed by: INTERNAL MEDICINE

## 2025-01-08 PROCEDURE — 7100000000 HC PACU RECOVERY - FIRST 15 MIN: Performed by: INTERNAL MEDICINE

## 2025-01-08 PROCEDURE — 7100000011 HC PHASE II RECOVERY - ADDTL 15 MIN: Performed by: INTERNAL MEDICINE

## 2025-01-08 PROCEDURE — 6360000002 HC RX W HCPCS: Performed by: NURSE ANESTHETIST, CERTIFIED REGISTERED

## 2025-01-08 PROCEDURE — 88305 TISSUE EXAM BY PATHOLOGIST: CPT

## 2025-01-08 PROCEDURE — 7100000001 HC PACU RECOVERY - ADDTL 15 MIN: Performed by: INTERNAL MEDICINE

## 2025-01-08 PROCEDURE — 7100000010 HC PHASE II RECOVERY - FIRST 15 MIN: Performed by: INTERNAL MEDICINE

## 2025-01-08 PROCEDURE — 3609018500 HC EGD US SCOPE W/ADJACENT STRUCTURES: Performed by: INTERNAL MEDICINE

## 2025-01-08 PROCEDURE — 3609012400 HC EGD TRANSORAL BIOPSY SINGLE/MULTIPLE: Performed by: INTERNAL MEDICINE

## 2025-01-08 PROCEDURE — 3700000001 HC ADD 15 MINUTES (ANESTHESIA): Performed by: INTERNAL MEDICINE

## 2025-01-08 PROCEDURE — 3700000000 HC ANESTHESIA ATTENDED CARE: Performed by: INTERNAL MEDICINE

## 2025-01-08 PROCEDURE — 2709999900 HC NON-CHARGEABLE SUPPLY: Performed by: INTERNAL MEDICINE

## 2025-01-08 PROCEDURE — 6360000002 HC RX W HCPCS: Performed by: ANESTHESIOLOGY

## 2025-01-08 RX ORDER — PROPOFOL 10 MG/ML
INJECTION, EMULSION INTRAVENOUS
Status: DISCONTINUED | OUTPATIENT
Start: 2025-01-08 | End: 2025-01-08 | Stop reason: SDUPTHER

## 2025-01-08 RX ORDER — HYDROCODONE BITARTRATE AND ACETAMINOPHEN 5; 325 MG/1; MG/1
1 TABLET ORAL EVERY 6 HOURS PRN
COMMUNITY

## 2025-01-08 RX ORDER — ONDANSETRON 2 MG/ML
4 INJECTION INTRAMUSCULAR; INTRAVENOUS
Status: COMPLETED | OUTPATIENT
Start: 2025-01-08 | End: 2025-01-08

## 2025-01-08 RX ORDER — TRAZODONE HYDROCHLORIDE 50 MG/1
50 TABLET, FILM COATED ORAL NIGHTLY
COMMUNITY

## 2025-01-08 RX ORDER — PANTOPRAZOLE SODIUM 40 MG/1
40 FOR SUSPENSION ORAL
COMMUNITY

## 2025-01-08 RX ORDER — FAMOTIDINE 20 MG/1
20 TABLET, FILM COATED ORAL 2 TIMES DAILY
COMMUNITY

## 2025-01-08 RX ORDER — LIDOCAINE HYDROCHLORIDE 20 MG/ML
INJECTION, SOLUTION EPIDURAL; INFILTRATION; INTRACAUDAL; PERINEURAL
Status: DISCONTINUED | OUTPATIENT
Start: 2025-01-08 | End: 2025-01-08 | Stop reason: SDUPTHER

## 2025-01-08 RX ORDER — ONDANSETRON 8 MG/1
8 TABLET, FILM COATED ORAL EVERY 8 HOURS PRN
COMMUNITY

## 2025-01-08 RX ORDER — LORAZEPAM 0.5 MG/1
0.5 TABLET ORAL 2 TIMES DAILY
COMMUNITY

## 2025-01-08 RX ORDER — ONDANSETRON 2 MG/ML
INJECTION INTRAMUSCULAR; INTRAVENOUS
Status: DISCONTINUED
Start: 2025-01-08 | End: 2025-01-08 | Stop reason: HOSPADM

## 2025-01-08 RX ORDER — CARVEDILOL 12.5 MG/1
12.5 TABLET ORAL 2 TIMES DAILY WITH MEALS
COMMUNITY

## 2025-01-08 RX ORDER — BUPROPION HYDROCHLORIDE 300 MG/1
300 TABLET ORAL EVERY MORNING
COMMUNITY

## 2025-01-08 RX ORDER — ESOMEPRAZOLE MAGNESIUM 20 MG/1
20 GRANULE, DELAYED RELEASE ORAL DAILY
Status: ON HOLD | COMMUNITY
End: 2025-01-08 | Stop reason: HOSPADM

## 2025-01-08 RX ORDER — LORATADINE 10 MG/1
10 TABLET ORAL DAILY
COMMUNITY

## 2025-01-08 RX ADMIN — PROPOFOL 120 MCG/KG/MIN: 10 INJECTION, EMULSION INTRAVENOUS at 11:04

## 2025-01-08 RX ADMIN — LIDOCAINE HYDROCHLORIDE 40 MG: 20 INJECTION, SOLUTION EPIDURAL; INFILTRATION; INTRACAUDAL; PERINEURAL at 11:04

## 2025-01-08 RX ADMIN — PROPOFOL 30 MG: 10 INJECTION, EMULSION INTRAVENOUS at 11:07

## 2025-01-08 RX ADMIN — ONDANSETRON 4 MG: 2 INJECTION, SOLUTION INTRAMUSCULAR; INTRAVENOUS at 12:04

## 2025-01-08 RX ADMIN — PROPOFOL 40 MG: 10 INJECTION, EMULSION INTRAVENOUS at 11:04

## 2025-01-08 ASSESSMENT — PAIN - FUNCTIONAL ASSESSMENT
PAIN_FUNCTIONAL_ASSESSMENT: NONE - DENIES PAIN
PAIN_FUNCTIONAL_ASSESSMENT: NONE - DENIES PAIN

## 2025-01-08 ASSESSMENT — PAIN SCALES - GENERAL: PAINLEVEL_OUTOF10: 0

## 2025-01-08 NOTE — PROGRESS NOTES
Patient arrived to endo preop per wheel chair. Pt is from [facility] and brought per transport. Pt is alert and oriented x4 and able to sign own consents.   Myesha Salas RN called [facility] to review medications when last taken.

## 2025-01-08 NOTE — PROGRESS NOTES
Discharge instructions reviewed with patient and nurse Melly. All home medications have been reviewed, pt v/u. Pt discharged via own wheelchair. Pt discharged with all belongings. Transport company taking stable pt back to [facility].

## 2025-01-08 NOTE — PROGRESS NOTES
Reviewed patient's medical and surgical history in electronic record and with patient at the bedside. All questions regarding procedure answered.

## 2025-01-08 NOTE — ANESTHESIA POSTPROCEDURE EVALUATION
Department of Anesthesiology  Postprocedure Note    Patient: Brittney Wallace  MRN: [id number]  YOB: 1955  Date of evaluation: 1/8/2025    Procedure Summary       Date: 01/08/25 Room / Location: [place] [address] / [facility]    Anesthesia Start: 1100 Anesthesia Stop: 1143    Procedures:       ESOPHAGOGASTRODUODENOSCOPY ULTRASOUND      ESOPHAGOGASTRODUODENOSCOPY BIOPSY (Abdomen) Diagnosis:       Pancreatic cyst      (Pancreatic cyst [K86.2])    Surgeons: Eron Dominique MD Responsible Provider: Nick Flores DO    Anesthesia Type: MAC ASA Status: 3            Anesthesia Type: No value filed.    Chary Phase I: Chary Score: 8    Chary Phase II:      Anesthesia Post Evaluation    Patient location during evaluation: PACU  Patient participation: complete - patient participated  Level of consciousness: awake  Airway patency: patent  Nausea & Vomiting: no nausea  Cardiovascular status: hemodynamically stable  Respiratory status: acceptable  Hydration status: euvolemic  Multimodal analgesia pain management approach  Pain management: adequate    No notable events documented.

## 2025-01-08 NOTE — H&P
Pre-operative History and Physical    Patient: Brittney Wallace  : 1955  Acct#:     HISTORY OF PRESENT ILLNESS:    The patient is a 69 y.o. female who presents with Nausea and vomiting a few hours after eating, dysphagia to solids, constipation. Prior CT had shown dilation of the downstream pancreatic duct similar to previous imaging. MRCP performed 2024 showed numerous cystic lesions throughout the pancreatic parenchyma with the largest 2.3 x 1.4 cm in the pancreatic head and neck. There was a 1.4 cm lesion in the body and a 1.5 cm lesion of the tail and a 1.1 cm lesion in the uncinate process. There is mild dilation of the pancreatic duct up to 4 mm in the pancreatic head.     Past Medical History:        Diagnosis Date    Anxiety     Arthritis     CAD (coronary artery disease)     Chronic back pain since     COPD (chronic obstructive pulmonary disease) (Formerly [facility])     Depression     Diabetes mellitus (HCC)     Fibromyalgia     Full dentures     GERD (gastroesophageal reflux disease)     Hyperlipidemia     Hypertension     IBS (irritable bowel syndrome)     Kidney failure     r/t sepsis    MI (myocardial infarction) (Former[facility])     PACO (obstructive sleep apnea)     NO CPAP     Parkinson disease ([facility])     Restless leg syndrome     Shoulder injury 10/07/2018    RT- recent fall     Thyroid disease       Past Surgical History:        Procedure Laterality Date    BRONCHOSCOPY  2017    [place]    CARPAL TUNNEL RELEASE Left     CARPAL TUNNEL RELEASE Right 2018    COLONOSCOPY      CORONARY ANGIOPLASTY WITH STENT PLACEMENT      FRACTURE SURGERY Left     leg    HIP FRACTURE SURGERY Left     OTHER SURGICAL HISTORY N/A 2018    SUPERION IMPLANT LUMBAR THREE-FOUR, LUMBAR FOUR-FIVE    [place] OPTX PROX HUMERAL FX W/INT FIXJ RPR TUBEROSITY Right 10/26/2018    RIGHT SHOULDER OPEN REDUCTION INTERNAL FIXATION PROXIMAL HUMERUS WITH TIBIAL ALLOGRAFT STRUT AND DBX MIX; LYSIS OF ADHESIONS; OPEN BICEPS

## 2025-01-08 NOTE — DISCHARGE INSTRUCTIONS
Impression:   Moderate gastritis biopsied  3cm sliding hiatal hernia.  Otherwise normal EGD.  Small bowel biopsies obtained.   Multiple small pancreatic cysts throughout the pancreas.  Largest 2.05 cm.  There were no intramural nodules.  These are likely side branch IPMN's  Mild dilation of the main pancreatic duct without strictures, stones or nodules.      Recommendations:  1. Clear liquid diet advance as tolerated.    2.  Would repeat MRI pancreas in 1 year to monitor these.  Would hold off on FNA or surgical evaluation unless intramural nodules develop or if a cyst grows to greater than 3cm.  Follow up with Dr. Rowell to set up the MRI.  3.  Will check stool elastase and fat and give creon if there is Exocrine pancreatic insufficiency.    Kush Dominique MD  [facility]      Endoscopy Discharge Instructions    Call with any questions or concerns.    You may be drowsy or lightheaded after receiving sedation.  DO NOT operate  a vehicle (automobile, bicycle, motorcycle, machinery, or power tools), no  alcoholic beverages, and do not make any important decisions today.                 Plan on bed rest or quiet relaxation today.  Resume normal activities in the morning.     Resume normal activity tomorrow unless otherwise advised by your physician.            Eat a light first meal, avoiding spicy and fatty foods, then resume normal diet unless  you are told otherwise by your physician.    If the intravenous medication site is painful, apply warm compresses on the site until the soreness is relieved and elevate the arm above the heart. Call your physician if no improvement  in 2-3 days.       POSSIBLE SYMPTOMS TO WATCH:     1. fever (greater than 100) 5. increased abdominal bloating   2. severe pain   6. excessive bleeding   3. nausea and vomiting  7. chest pain   4. chills    8. shortness of breath       Notify us if these problems occur     Expected as normal and remedies:  Sore throat: use over the counter throat

## 2025-01-08 NOTE — ANESTHESIA PRE PROCEDURE
Department of Anesthesiology  Preprocedure Note       Name:  Brittney Wallace   Age:  69 y.o.  :  1955                                          MRN:  [id number]         Date:  2025      Surgeon: Surgeon(s):  Eron Dominique MD    Procedure: Procedure(s):  ESOPHAGOGASTRODUODENOSCOPY ULTRASOUND    Medications prior to admission:   Prior to Admission medications    Medication Sig Start Date End Date Taking? Authorizing Provider   ULTICARE PEN NEEDLES 29G X 12.7MM MISC USE AS DIRECTED four times A DAY 22   Dada Casper MD   potassium chloride (KLOR-CON) 10 MEQ extended release tablet  22   Dada Casepr MD   QUEtiapine (SEROQUEL) 50 MG tablet TAKE TWO TABLETS BY MOUTH AT BEDTIME AS NEEDED 22   Dada Casper MD   OLANZapine zydis (ZYPREXA) 10 MG disintegrating tablet TAKE ONE TABLET BY MOUTH EVERY DAY 22   Dada Casper MD   carbidopa-levodopa (SINEMET)  MG per tablet TAKE THREE TABLETS BY MOUTH THREE TIMES DAILY 10/12/21   Dada Casper MD   cyclobenzaprine (FLEXERIL) 5 MG tablet TAKE ONE TABLET BY MOUTH THREE TIMES DAILY AS NEEDED FOR muscle spasm 10/12/21   Dada Casper MD   furosemide (LASIX) 40 MG tablet TAKE ONE TABLET BY MOUTH EVERY DAY 10/12/21   Dada Casper MD   NOVOLOG FLEXPEN 100 UNIT/ML injection pen inject SUBCUTANEOUSLY BEFORE meals THREE TIMES DAILY PER sliding scale max 40 UNITS DAILY 10/12/21   Dada Casper MD   atorvastatin (LIPITOR) 20 MG tablet TAKE ONE TABLET BY MOUTH AT BEDTIME 10/12/21   Dada Casper MD BREZTRI AEROSPHERE 160-9-4.8 MCG/ACT AERO inhale TWO puffs BY MOUTH TWICE DAILY 10/20/21   Dada Casper MD   Buprenorphine HCl (BELBUCA) 150 MCG FILM Place inside cheek.    Dada Casper MD   clonazePAM (KLONOPIN) 0.5 MG tablet  10/12/21   Dada Casper MD   diclofenac sodium (VOLTAREN) 1 % GEL apply FOUR GRAMS FOUR TIMES DAILY 10/12/21   Pennie

## (undated) DEVICE — PROTECTOR ULN NRV PUR FOAM HK LOOP STRP ANATOMICALLY

## (undated) DEVICE — 1010 S-DRAPE TOWEL DRAPE 10/BX: Brand: STERI-DRAPE™

## (undated) DEVICE — TURNOVER KIT RM INF CTRL TECH

## (undated) DEVICE — 3 BONE CEMENT MIXER: Brand: MIXEVAC

## (undated) DEVICE — PENCIL ES L3M ROCK SWCH S STL HEX LOK BLDE ELECTRD HOLSTER

## (undated) DEVICE — 3M™ TEGADERM™ TRANSPARENT FILM DRESSING FRAME STYLE, 1628, 6 IN X 8 IN (15 CM X 20 CM), 10/CT 8CT/CASE: Brand: 3M™ TEGADERM™

## (undated) DEVICE — SURE SET-DOUBLE BASIN-LF: Brand: MEDLINE INDUSTRIES, INC.

## (undated) DEVICE — BW-412T DISP COMBO CLEANING BRUSH: Brand: SINGLE USE COMBINATION CLEANING BRUSH

## (undated) DEVICE — 3M™ STERI-DRAPE™ U-DRAPE 1015: Brand: STERI-DRAPE™

## (undated) DEVICE — 3M™ STERI-DRAPE™ U-DRAPE 1067 1067 5/BX 4BX/CS/CTN&#X20;: Brand: STERI-DRAPE™

## (undated) DEVICE — BIT DRL L165MM DIA2.8MM QUIK CPL W/O STP REUSE

## (undated) DEVICE — COVER,MAYO STAND,XL,STERILE: Brand: MEDLINE

## (undated) DEVICE — NEEDLE SPNL L3.5IN PNK HUB S STL REG WALL FIT STYL W/ QNCKE

## (undated) DEVICE — GOWN,SIRUS,POLYRNF,BRTHSLV,XL,30/CS: Brand: MEDLINE

## (undated) DEVICE — Z INACTIVE USE 2660664 SOLUTION IRRIG 3000ML 0.9% SOD CHL USP UROMATIC PLAS CONT

## (undated) DEVICE — GOWN,REINFRCE,POLY,ECLIPSE,SLV,3XLG: Brand: MEDLINE

## (undated) DEVICE — TUBE SUCT YANKR ST DISP W/ CTRL BULB TP

## (undated) DEVICE — GARMENT,MEDLINE,DVT,INT,CALF,MED, GEN2: Brand: MEDLINE

## (undated) DEVICE — DBD-PACK,SHOULDER III: Brand: MEDLINE

## (undated) DEVICE — SUTURE VCRL SZ 0 L18IN ABSRB UD L36MM CT-1 1/2 CIR J840D

## (undated) DEVICE — GOWN,SIRUS,POLYRNF,SETINSLV,XL,20/CS: Brand: MEDLINE

## (undated) DEVICE — MEDI-VAC NON-CONDUCTIVE SUCTION TUBING: Brand: CARDINAL HEALTH

## (undated) DEVICE — 3M™ IOBAN™ 2 ANTIMICROBIAL INCISE DRAPE 6648EZ: Brand: IOBAN™ 2

## (undated) DEVICE — SST TWIST DRILL, STANDARD, 2MM DIA. X 127MM: Brand: MICROAIRE®

## (undated) DEVICE — SYSTEM SKIN CLSR 22CM DERMBND PRINEO

## (undated) DEVICE — SUTURE MCRYL SZ 4-0 L18IN ABSRB UD L19MM PS-2 3/8 CIR PRIM Y496G

## (undated) DEVICE — 2108 SERIES SAGITTAL BLADE (19.5 X 1.27 X 90.0MM)

## (undated) DEVICE — SOLUTION IRRIG 500ML STRL H2O NONPYROGENIC

## (undated) DEVICE — COVER,TABLE,HEAVY DUTY,77"X90",STRL: Brand: MEDLINE

## (undated) DEVICE — MOUTHPIECE ENDOSCP L CTRL OPN AND SIDE PORTS DISP

## (undated) DEVICE — ORTHO PRE OP PACK: Brand: MEDLINE INDUSTRIES, INC.

## (undated) DEVICE — GOWN AURORA NONREINF LG: Brand: MEDLINE INDUSTRIES, INC.

## (undated) DEVICE — INTENDED TO SUPPORT AND MAINTAIN THE POSITION OF AN ANESTHETIZED PATIENT DURING SURGERY: Brand: ERIN BEACH CHAIR FACE MASK

## (undated) DEVICE — PROTECTOR UNIV FOAM EXTREMITY DEVON

## (undated) DEVICE — HANDPIECE SUCTION TUBING INTERPULSE 10FT

## (undated) DEVICE — PACK,BASIC: Brand: MEDLINE

## (undated) DEVICE — 3M™ IOBAN™ 2 ANTIMICROBIAL INCISE DRAPE 6640EZ: Brand: IOBAN™ 2

## (undated) DEVICE — GAUZE,SPONGE,4"X4",16PLY,XRAY,STRL,LF: Brand: MEDLINE

## (undated) DEVICE — PAD,NON-ADHERENT,3X8,STERILE,LF,1/PK: Brand: MEDLINE

## (undated) DEVICE — ELECTRODE PT RET AD L9FT HI MOIST COND ADH HYDRGEL CORDED

## (undated) DEVICE — 3M™ COBAN™ NL STERILE NON-LATEX SELF-ADHERENT WRAP, 2086S, 6 IN X 5 YD (15 CM X 4,5 M), 12 ROLLS/CASE: Brand: 3M™ COBAN™

## (undated) DEVICE — FRAZIER SUCTION INSTRUMENT 12 FR W/CONTROL VENT & OBTURATOR: Brand: FRAZIER

## (undated) DEVICE — SST BUR, WIRE PASS DRILL, 2 FLUTES, MED., 1.5MM DIA: Brand: MICROAIRE®

## (undated) DEVICE — INTENDED FOR TISSUE SEPARATION, AND OTHER PROCEDURES THAT REQUIRE A SHARP SURGICAL BLADE TO PUNCTURE OR CUT.: Brand: BARD-PARKER ® CARBON RIB-BACK BLADES

## (undated) DEVICE — 3M™ STERI-STRIP™ REINFORCED ADHESIVE SKIN CLOSURES, R1547, 1/2 IN X 4 IN (12 MM X 100 MM), 6 STRIPS/ENVELOPE: Brand: 3M™ STERI-STRIP™

## (undated) DEVICE — CHLORAPREP 26ML ORANGE

## (undated) DEVICE — NEEDLE SUT 1/2 CIR TAPR TIP TNSL STRL SZ 1 DAVIS

## (undated) DEVICE — GLOVE SURG SZ 8 L12IN FNGR THK79MIL GRN LTX FREE

## (undated) DEVICE — SPONGE,LAP,18"X18",DLX,XR,ST,5/PK,40/PK: Brand: MEDLINE

## (undated) DEVICE — STOCKINETTE ORTH W9XL36IN COT 2 PLY HLLW FOR HANDLING LMB

## (undated) DEVICE — TOWEL,OR,DSP,ST,BLUE,DLX,8/PK,10PK/CS: Brand: MEDLINE

## (undated) DEVICE — GLOVE SURG SZ 8 L12IN FNGR THK75MIL WHT LTX POLYMER BEAD

## (undated) DEVICE — BANDAGE COBAN 4 IN COMPR W4INXL5YD FOAM COHESIVE QUIK STK SELF ADH SFT

## (undated) DEVICE — BIT DRL L110MM DIA2.5MM G QUIK CPL W/O STP REUSE

## (undated) DEVICE — SCREW BNE L50MM DIA3.5MM CORT S STL ST LOK FULL THRD: Type: IMPLANTABLE DEVICE | Site: SHOULDER | Status: NON-FUNCTIONAL

## (undated) DEVICE — HIGH FLOW TIP

## (undated) DEVICE — CARBIDE BUR, OVAL CUTTING, 8 FLUTES, MED., 4MM DIA.: Brand: MICROAIRE®

## (undated) DEVICE — Device: Brand: BALLOON3

## (undated) DEVICE — NEEDLE SUT SZ 4 MAYO CATGUT 1/2 CIR TAPR PNT DISP

## (undated) DEVICE — SCREW BNE L38MM DIA3.5MM CORT S STL ST LOK FULL THRD: Type: IMPLANTABLE DEVICE | Site: SHOULDER | Status: NON-FUNCTIONAL

## (undated) DEVICE — SUTURE N ABSRB BRAIDED 2-0 OS-4 30 IN GRN ETHBND EXCEL X519H

## (undated) DEVICE — STANDARD HYPODERMIC NEEDLE,POLYPROPYLENE HUB: Brand: MONOJECT

## (undated) DEVICE — SPONGE GZ W4XL8IN COT WVN 12 PLY

## (undated) DEVICE — E-Z CLEAN, NON-STICK, PTFE COATED, ELECTROSURGICAL BLADE ELECTRODE, 2.5 INCH (6.35 CM): Brand: EZ CLEAN

## (undated) DEVICE — INTENDED FOR TISSUE SEPARATION, AND OTHER PROCEDURES THAT REQUIRE A SHARP SURGICAL BLADE TO PUNCTURE OR CUT.: Brand: BARD-PARKER ® STAINLESS STEEL BLADES

## (undated) DEVICE — SHEET,DRAPE,53X77,STERILE: Brand: MEDLINE

## (undated) DEVICE — 3M™ WARMING BLANKET, LOWER BODY, 10 PER CASE, 42568: Brand: BAIR HUGGER™

## (undated) DEVICE — SUTURE ETHBND EXCEL SZ 2 L30IN NONABSORBABLE GRN L40MM V-37 MX69G

## (undated) DEVICE — KIT SHLDR STBL MARCO FOR SPIDER LIMB POS

## (undated) DEVICE — T4 HOOD

## (undated) DEVICE — DRAPE 70X60IN SPLIT IMPERV ADHES STRIP

## (undated) DEVICE — COVER LT HNDL BLU PLAS

## (undated) DEVICE — FORCEPS BX L240CM WRK CHN 2.8MM STD CAP W/ NDL MIC MESH

## (undated) DEVICE — SLING ARM L 33-38CM BLK MESH FAB EZ OPN FR PNL W/

## (undated) DEVICE — PAD DRY FLOOR ABS 32X58IN GRN

## (undated) DEVICE — INTENDED TO AID IN THE PASSING OF SUTURES THROUGH BONE AND SOFT TISSUE DURING ORTHOPEDIC SURGERY: Brand: HOFFEE SUTURE RETRIEVER

## (undated) DEVICE — DUP USE 341662 MASK ERIN DISP FOR USE W/TENET BEACH CHAR

## (undated) DEVICE — Z INACTIVE NO SUPPLIER SOLUTIONIRRIG 3000ML 0.9% SOD CHL FLX CONT [79720808] [HOSPIRA WORLDWIDE INC]

## (undated) DEVICE — 3M™ MEDIPORE™ SOFT CLOTH TAPE, 4 INCH X 10 YARDS, 12 ROLLS/CASE, 2964: Brand: 3M™ MEDIPORE™

## (undated) DEVICE — CANNULA NSL AD TBNG L7FT PVC STR NONFLARED PRNG O2 DEL W STD

## (undated) DEVICE — SYRINGE IRRIG 60ML SFT PLIABLE BLB EZ TO GRP 1 HND USE W/

## (undated) DEVICE — PAD,ABDOMINAL,5"X9",ST,LF,25/BX: Brand: MEDLINE INDUSTRIES, INC.

## (undated) DEVICE — T-MAX DISPOSABLE FACE MASK 8 PER BOX

## (undated) DEVICE — SUTURE VCRL SZ 2-0 L18IN ABSRB UD CT-1 L36MM 1/2 CIR J839D

## (undated) DEVICE — AIR/WATER CLEANING ADAPTER FOR OLYMPUS® GI ENDOSCOPE: Brand: BULLDOG®

## (undated) DEVICE — SLING ARM M 28-33CM BLK MESH FAB EZ OPN FR PNL W/

## (undated) DEVICE — STAPLER SKIN H3.9MM WIRE DIA0.58MM CRWN 6.9MM 35 STPL FIX

## (undated) DEVICE — SYRINGE MED 10ML TRNSLUC BRL PLUNG BLK MRK POLYPR CTRL

## (undated) DEVICE — SURGICAL SET UP - SURE SET: Brand: MEDLINE INDUSTRIES, INC.

## (undated) DEVICE — SINGLE USE AIR/WATER, SUCTION AND BIOPSY VALVES SET: Brand: ORCAPOD™

## (undated) DEVICE — BLADE ES L2.75IN ELASTOMERIC COAT DURABLE BEND UPTO 90DEG

## (undated) DEVICE — EYE PROTECTOR FOAM MEDICHOICE

## (undated) DEVICE — DRAPE C ARM UNIV W41XL74IN CLR PLAS XR VELC CLSR POLY STRP

## (undated) DEVICE — SOLUTION IV 1000ML 0.9% SOD CHL

## (undated) DEVICE — DRESSING PETRO W3XL8IN N ADH KNIT CELOS ACETT ADPTC

## (undated) DEVICE — PEEL-AWAY HOOD: Brand: FLYTE, SURGICOOL

## (undated) DEVICE — YANKAUER,OPEN TIP,W/O VENT,STERILE: Brand: MEDLINE INDUSTRIES, INC.

## (undated) DEVICE — GOWN,SIRUS,POLYRNF,BRTHSLV,XLN/XXL,18/CS: Brand: MEDLINE

## (undated) DEVICE — ENDOSCOPIC KIT 6X3/16 FT COLON W/ 1.1 OZ 2 GWN W/O BRSH

## (undated) DEVICE — SYRINGE CATH TIP 50ML